# Patient Record
Sex: MALE | Race: WHITE | NOT HISPANIC OR LATINO | Employment: OTHER | URBAN - METROPOLITAN AREA
[De-identification: names, ages, dates, MRNs, and addresses within clinical notes are randomized per-mention and may not be internally consistent; named-entity substitution may affect disease eponyms.]

---

## 2017-01-26 ENCOUNTER — TRANSCRIBE ORDERS (OUTPATIENT)
Dept: ADMINISTRATIVE | Facility: HOSPITAL | Age: 62
End: 2017-01-26

## 2017-01-26 ENCOUNTER — TRANSCRIBE ORDERS (OUTPATIENT)
Dept: CARDIOLOGY | Facility: CLINIC | Age: 62
End: 2017-01-26

## 2017-01-26 DIAGNOSIS — J84.115 RESPIRATORY BRONCHIOLITIS INTERSTITIAL LUNG DISEASE (HCC): Primary | ICD-10-CM

## 2017-01-26 DIAGNOSIS — R06.02 SOB (SHORTNESS OF BREATH): Primary | ICD-10-CM

## 2017-01-30 ENCOUNTER — TRANSCRIBE ORDERS (OUTPATIENT)
Dept: CARDIOLOGY | Facility: CLINIC | Age: 62
End: 2017-01-30

## 2017-01-30 ENCOUNTER — HOSPITAL ENCOUNTER (OUTPATIENT)
Dept: RESPIRATORY THERAPY | Facility: HOSPITAL | Age: 62
Discharge: HOME | End: 2017-01-30
Attending: INTERNAL MEDICINE | Admitting: INTERNAL MEDICINE

## 2017-01-30 DIAGNOSIS — J84.115 RESPIRATORY BRONCHIOLITIS INTERSTITIAL LUNG DISEASE (HCC): ICD-10-CM

## 2017-01-30 DIAGNOSIS — R06.02 SHORTNESS OF BREATH: Primary | ICD-10-CM

## 2017-01-30 PROCEDURE — 94726 PLETHYSMOGRAPHY LUNG VOLUMES: CPT

## 2017-01-30 PROCEDURE — 94010 BREATHING CAPACITY TEST: CPT

## 2017-01-30 PROCEDURE — 94729 DIFFUSING CAPACITY: CPT

## 2017-01-30 RX ORDER — ALBUTEROL SULFATE 2.5 MG/3ML
2.5 SOLUTION RESPIRATORY (INHALATION) ONCE AS NEEDED
Status: DISCONTINUED | OUTPATIENT
Start: 2017-01-30 | End: 2017-01-31 | Stop reason: HOSPADM

## 2017-01-31 ENCOUNTER — HOSPITAL ENCOUNTER (OUTPATIENT)
Dept: CARDIOLOGY | Facility: HOSPITAL | Age: 62
Discharge: HOME | End: 2017-01-31
Attending: INTERNAL MEDICINE | Admitting: INTERNAL MEDICINE

## 2017-01-31 VITALS
HEIGHT: 70 IN | BODY MASS INDEX: 31.5 KG/M2 | WEIGHT: 220 LBS | DIASTOLIC BLOOD PRESSURE: 90 MMHG | HEART RATE: 74 BPM | SYSTOLIC BLOOD PRESSURE: 138 MMHG

## 2017-01-31 LAB
BH CV ECHO MEAS - ACS: 1.8 CM
BH CV ECHO MEAS - AO MAX PG (FULL): 6.9 MMHG
BH CV ECHO MEAS - AO MAX PG: 11.1 MMHG
BH CV ECHO MEAS - AO MEAN PG (FULL): 3.1 MMHG
BH CV ECHO MEAS - AO MEAN PG: 5.6 MMHG
BH CV ECHO MEAS - AO ROOT AREA (BSA CORRECTED): 1.4
BH CV ECHO MEAS - AO ROOT AREA: 7 CM^2
BH CV ECHO MEAS - AO ROOT DIAM: 3 CM
BH CV ECHO MEAS - AO V2 MAX: 167 CM/SEC
BH CV ECHO MEAS - AO V2 MEAN: 106 CM/SEC
BH CV ECHO MEAS - AO V2 VTI: 29.1 CM
BH CV ECHO MEAS - AVA(I,A): 2.3 CM^2
BH CV ECHO MEAS - AVA(I,D): 2.3 CM^2
BH CV ECHO MEAS - AVA(V,A): 2 CM^2
BH CV ECHO MEAS - AVA(V,D): 2 CM^2
BH CV ECHO MEAS - BSA(HAYCOCK): 2.2 M^2
BH CV ECHO MEAS - BSA: 2.2 M^2
BH CV ECHO MEAS - BZI_BMI: 31.6 KILOGRAMS/M^2
BH CV ECHO MEAS - BZI_METRIC_HEIGHT: 177.8 CM
BH CV ECHO MEAS - BZI_METRIC_WEIGHT: 99.8 KG
BH CV ECHO MEAS - CONTRAST EF (2CH): 62.5 ML/M^2
BH CV ECHO MEAS - CONTRAST EF 4CH: 60.3 ML/M^2
BH CV ECHO MEAS - EDV(MOD-SP2): 64 ML
BH CV ECHO MEAS - EDV(MOD-SP4): 63 ML
BH CV ECHO MEAS - EDV(TEICH): 132.7 ML
BH CV ECHO MEAS - EF(CUBED): 74.8 %
BH CV ECHO MEAS - EF(MOD-SP2): 62.5 %
BH CV ECHO MEAS - EF(MOD-SP4): 60.3 %
BH CV ECHO MEAS - EF(TEICH): 66.3 %
BH CV ECHO MEAS - ESV(MOD-SP2): 24 ML
BH CV ECHO MEAS - ESV(MOD-SP4): 25 ML
BH CV ECHO MEAS - ESV(TEICH): 44.7 ML
BH CV ECHO MEAS - FS: 36.8 %
BH CV ECHO MEAS - IVS/LVPW: 1
BH CV ECHO MEAS - IVSD: 1.2 CM
BH CV ECHO MEAS - LAT PEAK E' VEL: 8 CM/SEC
BH CV ECHO MEAS - LV DIASTOLIC VOL/BSA (35-75): 29 ML/M^2
BH CV ECHO MEAS - LV MASS(C)D: 259.4 GRAMS
BH CV ECHO MEAS - LV MASS(C)DI: 119.3 GRAMS/M^2
BH CV ECHO MEAS - LV MAX PG: 4.2 MMHG
BH CV ECHO MEAS - LV MEAN PG: 2.6 MMHG
BH CV ECHO MEAS - LV SYSTOLIC VOL/BSA (12-30): 11.5 ML/M^2
BH CV ECHO MEAS - LV V1 MAX: 102.8 CM/SEC
BH CV ECHO MEAS - LV V1 MEAN: 75.5 CM/SEC
BH CV ECHO MEAS - LV V1 VTI: 20.7 CM
BH CV ECHO MEAS - LVIDD: 5.3 CM
BH CV ECHO MEAS - LVIDS: 3.3 CM
BH CV ECHO MEAS - LVLD AP2: 6.8 CM
BH CV ECHO MEAS - LVLD AP4: 6.7 CM
BH CV ECHO MEAS - LVLS AP2: 6 CM
BH CV ECHO MEAS - LVLS AP4: 5.8 CM
BH CV ECHO MEAS - LVOT AREA (M): 3.1 CM^2
BH CV ECHO MEAS - LVOT AREA: 3.2 CM^2
BH CV ECHO MEAS - LVOT DIAM: 2 CM
BH CV ECHO MEAS - LVPWD: 1.2 CM
BH CV ECHO MEAS - MED PEAK E' VEL: 7 CM/SEC
BH CV ECHO MEAS - MV A DUR: 0.11 SEC
BH CV ECHO MEAS - MV A MAX VEL: 82 CM/SEC
BH CV ECHO MEAS - MV DEC SLOPE: 166.2 CM/SEC^2
BH CV ECHO MEAS - MV DEC TIME: 0.35 SEC
BH CV ECHO MEAS - MV E MAX VEL: 54.3 CM/SEC
BH CV ECHO MEAS - MV E/A: 0.66
BH CV ECHO MEAS - MV MAX PG: 2.6 MMHG
BH CV ECHO MEAS - MV MEAN PG: 0.99 MMHG
BH CV ECHO MEAS - MV P1/2T MAX VEL: 55.8 CM/SEC
BH CV ECHO MEAS - MV P1/2T: 98.3 MSEC
BH CV ECHO MEAS - MV V2 MAX: 80.7 CM/SEC
BH CV ECHO MEAS - MV V2 MEAN: 46.3 CM/SEC
BH CV ECHO MEAS - MV V2 VTI: 18.6 CM
BH CV ECHO MEAS - MVA P1/2T LCG: 3.9 CM^2
BH CV ECHO MEAS - MVA(P1/2T): 2.2 CM^2
BH CV ECHO MEAS - MVA(VTI): 3.6 CM^2
BH CV ECHO MEAS - PA MAX PG (FULL): 4.2 MMHG
BH CV ECHO MEAS - PA MAX PG: 5.8 MMHG
BH CV ECHO MEAS - PA V2 MAX: 120.8 CM/SEC
BH CV ECHO MEAS - PVA(V,A): 1.7 CM^2
BH CV ECHO MEAS - PVA(V,D): 1.7 CM^2
BH CV ECHO MEAS - QP/QS: 0.51
BH CV ECHO MEAS - RV MAX PG: 1.6 MMHG
BH CV ECHO MEAS - RV MEAN PG: 0.9 MMHG
BH CV ECHO MEAS - RV V1 MAX: 63.1 CM/SEC
BH CV ECHO MEAS - RV V1 MEAN: 44.5 CM/SEC
BH CV ECHO MEAS - RV V1 VTI: 10.3 CM
BH CV ECHO MEAS - RVOT AREA: 3.3 CM^2
BH CV ECHO MEAS - RVOT DIAM: 2.1 CM
BH CV ECHO MEAS - SI(AO): 94.5 ML/M^2
BH CV ECHO MEAS - SI(CUBED): 49.9 ML/M^2
BH CV ECHO MEAS - SI(LVOT): 30.8 ML/M^2
BH CV ECHO MEAS - SI(MOD-SP2): 18.4 ML/M^2
BH CV ECHO MEAS - SI(MOD-SP4): 17.5 ML/M^2
BH CV ECHO MEAS - SI(TEICH): 40.5 ML/M^2
BH CV ECHO MEAS - SUP REN AO DIAM: 2 CM
BH CV ECHO MEAS - SV(AO): 205.3 ML
BH CV ECHO MEAS - SV(CUBED): 108.5 ML
BH CV ECHO MEAS - SV(LVOT): 67 ML
BH CV ECHO MEAS - SV(MOD-SP2): 40 ML
BH CV ECHO MEAS - SV(MOD-SP4): 38 ML
BH CV ECHO MEAS - SV(RVOT): 34.3 ML
BH CV ECHO MEAS - SV(TEICH): 87.9 ML
BH CV ECHO MEAS - TAPSE (>1.6): 2.2 CM2
BH CV XLRA - RV BASE: 2.8 CM
BH CV XLRA - TDI S': 13 CM/SEC
E/E' RATIO: 8
LEFT ATRIUM VOLUME INDEX: 14 ML/M2
LEFT ATRIUM VOLUME: 31 CM3
LV EF 2D ECHO EST: 60 %

## 2017-01-31 PROCEDURE — 93306 TTE W/DOPPLER COMPLETE: CPT

## 2017-01-31 PROCEDURE — 93306 TTE W/DOPPLER COMPLETE: CPT | Performed by: INTERNAL MEDICINE

## 2017-07-30 ENCOUNTER — PREP FOR SURGERY (OUTPATIENT)
Dept: OTHER | Facility: HOSPITAL | Age: 62
End: 2017-07-30

## 2017-07-30 DIAGNOSIS — K63.5 COLON POLYPS: Primary | ICD-10-CM

## 2017-07-30 DIAGNOSIS — Z80.0 FH: COLON CANCER: ICD-10-CM

## 2017-07-30 RX ORDER — SODIUM CHLORIDE 0.9 % (FLUSH) 0.9 %
1-10 SYRINGE (ML) INJECTION AS NEEDED
Status: CANCELLED | OUTPATIENT
Start: 2017-12-05

## 2017-08-14 PROBLEM — K63.5 COLON POLYPS: Status: ACTIVE | Noted: 2017-08-14

## 2017-08-14 PROBLEM — Z80.0 FH: COLON CANCER: Status: ACTIVE | Noted: 2017-08-14

## 2017-12-05 ENCOUNTER — HOSPITAL ENCOUNTER (OUTPATIENT)
Facility: HOSPITAL | Age: 62
Setting detail: HOSPITAL OUTPATIENT SURGERY
Discharge: HOME | End: 2017-12-05
Attending: INTERNAL MEDICINE | Admitting: INTERNAL MEDICINE

## 2017-12-05 ENCOUNTER — ANESTHESIA (OUTPATIENT)
Dept: GASTROENTEROLOGY | Facility: HOSPITAL | Age: 62
End: 2017-12-05

## 2017-12-05 ENCOUNTER — ANESTHESIA EVENT (OUTPATIENT)
Dept: GASTROENTEROLOGY | Facility: HOSPITAL | Age: 62
End: 2017-12-05

## 2017-12-05 VITALS
HEIGHT: 70 IN | SYSTOLIC BLOOD PRESSURE: 122 MMHG | WEIGHT: 228 LBS | RESPIRATION RATE: 16 BRPM | DIASTOLIC BLOOD PRESSURE: 79 MMHG | BODY MASS INDEX: 32.64 KG/M2 | TEMPERATURE: 97.2 F | HEART RATE: 64 BPM | OXYGEN SATURATION: 95 %

## 2017-12-05 DIAGNOSIS — K63.5 COLON POLYPS: ICD-10-CM

## 2017-12-05 DIAGNOSIS — Z80.0 FH: COLON CANCER: ICD-10-CM

## 2017-12-05 PROCEDURE — 45378 DIAGNOSTIC COLONOSCOPY: CPT | Performed by: INTERNAL MEDICINE

## 2017-12-05 PROCEDURE — 25010000002 PROPOFOL 10 MG/ML EMULSION: Performed by: ANESTHESIOLOGY

## 2017-12-05 RX ORDER — MONTELUKAST SODIUM 10 MG/1
10 TABLET ORAL EVERY MORNING
COMMUNITY

## 2017-12-05 RX ORDER — AZELASTINE 1 MG/ML
1 SPRAY, METERED NASAL DAILY
COMMUNITY

## 2017-12-05 RX ORDER — LIDOCAINE HYDROCHLORIDE 20 MG/ML
INJECTION, SOLUTION INFILTRATION; PERINEURAL AS NEEDED
Status: DISCONTINUED | OUTPATIENT
Start: 2017-12-05 | End: 2017-12-05 | Stop reason: SURG

## 2017-12-05 RX ORDER — PROPOFOL 10 MG/ML
VIAL (ML) INTRAVENOUS AS NEEDED
Status: DISCONTINUED | OUTPATIENT
Start: 2017-12-05 | End: 2017-12-05 | Stop reason: SURG

## 2017-12-05 RX ORDER — SODIUM CHLORIDE, SODIUM LACTATE, POTASSIUM CHLORIDE, CALCIUM CHLORIDE 600; 310; 30; 20 MG/100ML; MG/100ML; MG/100ML; MG/100ML
30 INJECTION, SOLUTION INTRAVENOUS CONTINUOUS PRN
Status: DISCONTINUED | OUTPATIENT
Start: 2017-12-05 | End: 2017-12-05 | Stop reason: HOSPADM

## 2017-12-05 RX ORDER — SODIUM CHLORIDE 0.9 % (FLUSH) 0.9 %
1-10 SYRINGE (ML) INJECTION AS NEEDED
Status: DISCONTINUED | OUTPATIENT
Start: 2017-12-05 | End: 2017-12-05 | Stop reason: HOSPADM

## 2017-12-05 RX ORDER — MOMETASONE FUROATE 50 UG/1
2 SPRAY, METERED NASAL AS NEEDED
COMMUNITY
End: 2019-02-27

## 2017-12-05 RX ADMIN — PROPOFOL 200 MG: 10 INJECTION, EMULSION INTRAVENOUS at 11:50

## 2017-12-05 RX ADMIN — SODIUM CHLORIDE, POTASSIUM CHLORIDE, SODIUM LACTATE AND CALCIUM CHLORIDE 30 ML/HR: 600; 310; 30; 20 INJECTION, SOLUTION INTRAVENOUS at 11:07

## 2017-12-05 RX ADMIN — PROPOFOL 200 MG: 10 INJECTION, EMULSION INTRAVENOUS at 11:34

## 2017-12-05 RX ADMIN — LIDOCAINE HYDROCHLORIDE 100 MG: 20 INJECTION, SOLUTION INFILTRATION; PERINEURAL at 11:34

## 2017-12-05 NOTE — DISCHARGE INSTRUCTIONS
For the next 24 hours patient needs to be with a responsible adult.    For 24 hours DO NOT drive, operate machinery, appliances, drink alcohol, make important decisions or sign legal documents.    Start with a light or bland diet and advance to regular diet as tolerated.    Follow recommendations on procedure report provided by your doctor.    Call Dr. Roldan for problems [phone]    Problems may include but not limited to: large amounts of bleeding, trouble breathing, repeated vomiting, severe unrelieved pain, fever or chills.       WHAT ARE DIVERTICULOSIS AND DIVERTICULITIS?  Many people have small pouches in their colons that bulge outward through weak spots, like an inner tube that pokes through weak places in a tire.  Each pouch is called a diverticulum.  The condition of having diverticula is DIVERTICULOSIS.  The condition becomes more common as people age.  About half of all people over the age of 60 have diverticulosis    When pouches become infected or inflamed, the condition is called DIVERTICULITIS.  This happens in 10% to 25% of people with diverticulosis.  Diverticulosis and diverticulitis are also called DIVERTICULAR DISEASE.     WHAT ARE THE SYMPTOMS?  Diverticulosis - Most people do not have any discomfort or symptoms.  However, symptoms may include mild cramps, bloating, and constipation.  Other diseases such as irritable bowel syndrome (IBS) and stomach ulcers cause similar problems, so these symptoms do not always mean a person has diverticulosis.  You should visit your doctor if you have these troubling symptoms.    Diverticulitis - The most common symptom is abdominal pain.  The most common sign is tenderness around the left side of the lower abdomen.  If infection is the cause, fever, nausea, vomiting, chills, cramping, and constipation may occur as well.  The severity depends on the extent of the infection and complications.    WHAT ARE THE COMPLICATIONS?  Diverticulitis can lead to  bleeding, infections,perforations or tears, or blockages.  These complications always require treatment to prevent them from proggressing and causing serous illness.    Bleeding from a diverticula is a rare complication.  When this occurs, blood may appear in the toilet or in your stool.  Bleeding can be severe, but it may stop by itself and not require treatment.  Doctors believe bleeding diverticula are caused by a small blood vessel in a diverticulum that weakens and finally bursts.  If you have bleeding from the rectum, you should see your doctor.  If the bleeding does not stop you may need surgery.    Abscess, Perforation, and Peritonitis - The infection causing diverticulitis often clears up after a few days of treatment with antibiotics.  If the condition gets worse, an abscess may form in the colon.  An abscess is an infected area with pus that may cause swelling and destroy tissue.  Sometimes the infected diverticula may develop small holes, called perforations.  These perforations allow pus to leak out of the colon into the abdominal area.  If the abscess is small and remains in the colon, it may clear up after treatment with antibiotics.  If not, the doctor may need to drain it.  A large abscess can become a serious problem if the infection leaks out and contaminates areas outside the colon.  Infection that spreads into the abdominal cavity is called peritonitis.  Peritonitis requires immediate surgery toclean the abdominal cavity and remove the damaged part of the colon.  Without surgery, peritonitis can be fatal.    FISTULA  A fistula is an abnormal connection of tissue between two organs or between an organ and the skin.  When damaged tissues come into contact with each other during infection, they sometimes stick together.  If they heal that way, a fistula forms.  When diverticulitis-related infection spreads through out the colon, the colon's tissue may stick to nearby tissues.  The organs usually  involved are the bladder, small intestine, and skin.  The problem can be corrected with surgery to remove the fistula and affected part of the colon.    INTESTINAL OBSTRUCTION  The scarring caused by infection may cause partial or total blockage of the large intestine.  When this happens, the colon is unable to move bowel contents normally.  When the obstruction totally blocks the intestine, emergency surgery is necessary.  Partial blockage is not an emergency, so the surgery to correct it can be planned.    WHAT CAUSES DIVERTICULAR DISEASE  Although not proven, the dominant theory is that a low-fiber diet is the main cause of diverticular disease.  The disease was first noticed in the [place] in the early 1900s.  At about the same time, processed foods were introduced into the American diet.  Many processed foods contain refined, low-fiber flour.  Unlike whole-wheat flour, refined flour has no wheat bran.    Diverticular disease is common in developed or industrialized countries-particularly the [place], [place], and [place]-where low-fiber diets are common.  The disease is rare in countries of [place] and [place], where people eat high-fiber vegetable diets.    Fiber is the part of fruits, vegetables, and whole grains that the body cannot digest.  Some fiber dissolves easily in water (soluble fiber).  It takes on a soft, jelly-like texture in the intestines.  Some fiber passes almost unchanged through the intestines (insoluble fiber).  Both kinds of fiber help make stools soft and easy to pass.  Fiber also prevents constipation.    Constipation makes the muscles strain to move stool that is too hard.  It is the main cause of increased pressure in the colon.  This excess pressure might cause the weak spots in the colon to bulge out and become diverticula.  Diverticulitis occurs when diverticula become infected or inflamed.  Doctors are not certain what causes the infection.  It may begin when stool or  bacteria are caught in the diverticula.  An attack of diverticulitis can develop suddenly and without warning.    HOW DOES THE DOCTOR DIAGNOSE DIVERTICULAR DISEASE  The doctor asks about medical history, does a physical exam, and may perform one or more diagnostic tests.  Because most people do not have symptoms, diverticulosis is often found through tests ordered for another ailment.    When taking a medical history, the doctor may ask about bowel habits, symptoms, pain, diet, and medications.  The physical exam usually involves a digital rectal exam.  To preform this test. The doctor inserts a gloved, lubricated finger into the rectum to detect tenderness, blockage, or blood.  The doctor may check stool for signs of bleeding and test blood for signs of infection.  The doctor may also order x-rays or other tests.    WHAT IS THE TREATMENT FOR DIVERTICULAR DISEASE  Increasing the amount of fiber in the diet may reduce symptoms of diverticulosis and prevent complications such as diverticulitis.  Fiber keeps stool soft and lowers pressure inside the colon so that bowel contents can move through easily.  The American Dietetic Association. Recommends 20 to 35 grams of fiber each day.  The doctor may also recommend taking a fiber product such as Citrucel or Metamucil once a dya.  These products are mixed water and provide about 2 to 3.5 grams of fiber per  Tablespoon, mixed with 8 ounces of water.    Avoidance of nuts, popcorn, and sunflower, pumpkin, duane, and sesame seeds has been recommended by physicians out of fear that food particles could enter, block, or irritate the diverticula.  However, no scientific data support this treatment measure.  Eating a high-fiber diet is the only requirement highly emphasized across the medical literature.  Eliminating specific foods is not necessary.  The seeds in tomatoes, zucchini, cucumbers, strawberries, and raspberries, as well as poppy seeds, are generally considered  harmless.  People differ in amounts and types of foods the can eat.  Decisions about diet should be made based on what works best for each person.  Keeping a food diary may help identify what foods may cause symptoms.    If cramps, bloating, and constipation are problems, the doctor may prescribe  Short course of pain medication.  However, many medications affect emptying of the colon, an undesirable side effect for people with diverticulosis.    DIVERTICULITIS  Treatment focuses on clearing up the infection and inflammation, resting the colon, and preventing or minimizing complications.  An attack of diverticulitis without complications may respond to antibiotics within a few days if treated early.  To help the colon rest, the doctor may recommend bed rest and a liquid diet, along with a pain reliever.    An acute attack with severe pain or sever infection may require a hospital stay.  Most acute cases of diverticulitis are treated with antibiotics and a liquid diet.  The antibiotics are given by injection into a vein.  In some cases, however, surgery may be necessary.    WHEN IS SURGERY NECESSARY  If attacks are severe or frequent, the doctor may advise surgery.  The surgeon removes the affected part of the colon and joins the remaining sections.  This typed of surgery, called colon resection, aims to keep attacks from coming back and to prevent complications.  The doctor may also recommend surgery for complications of a fistula or intestinal obstruction.    If antibiotics do not correct an attack, emergency surgery may be required.  Other reasons for emergency surgery include a large abscess, perforation, peritonitis, or continued bleeding.    Emergency surgery usually involves 2 operations.  The first will clear the infected abdominal cavity and remove part of the colon.  Because infection and sometimes obstruction, it is not safe to rejoin the colon during the first operation.  Instead, the surgeon creates a  temporary hole, or stoma, in the abdomen.  The end of the colon is connected to the hole, a procedure called a colostomy, to allow normal eating and bowel movements.  The stool goes into a bag attached to the opening in the abdomen.  In the second operation, the surgeon rejoins the ends of the colon.

## 2017-12-05 NOTE — ANESTHESIA PREPROCEDURE EVALUATION
Anesthesia Evaluation     Patient summary reviewed and Nursing notes reviewed   NPO Solid Status: > 8 hours  NPO Liquid Status: > 6 hours     Airway   Dental      Pulmonary    (+) asthma,   Cardiovascular         Neuro/Psych  GI/Hepatic/Renal/Endo    (+) obesity,      Musculoskeletal     Abdominal    Substance History      OB/GYN          Other                                      Anesthesia Plan    ASA 3     MAC     Anesthetic plan and risks discussed with patient.

## 2017-12-05 NOTE — ANESTHESIA POSTPROCEDURE EVALUATION
"Patient: Nathan Burk    Procedure Summary     Date Anesthesia Start Anesthesia Stop Room / Location    12/05/17 1134 1157  [facility] /  [facility]       Procedure Diagnosis Surgeon Provider    COLONOSCOPY to cecum and t.i. (N/A ) Colon polyps; FH: colon cancer  (Colon polyps [K63.5]; FH: colon cancer [Z80.0]) MD Cony Franco MD          Anesthesia Type: MAC  Last vitals  BP   122/79 (12/05/17 1218)   Temp   36.2 °C (97.2 °F) (12/05/17 1208)   Pulse   64 (12/05/17 1218)   Resp   16 (12/05/17 1218)     SpO2   95 % (12/05/17 1218)     Post Anesthesia Care and Evaluation    Patient location during evaluation: PACU  Patient participation: complete - patient participated  Level of consciousness: awake  Pain score: 0  Pain management: adequate  Airway patency: patent  Anesthetic complications: No anesthetic complications    Cardiovascular status: acceptable  Respiratory status: acceptable  Hydration status: acceptable    Comments: Blood pressure 122/79, pulse 64, temperature 36.2 °C (97.2 °F), temperature source Oral, resp. rate 16, height 177.8 cm (70\"), weight 103 kg (228 lb), SpO2 95 %.    No anesthesia care post op    "

## 2017-12-05 NOTE — PLAN OF CARE
Problem: Patient Care Overview (Adult)  Goal: Plan of Care Review  Outcome: Ongoing (interventions implemented as appropriate)    12/05/17 1046   Coping/Psychosocial Response Interventions   Plan Of Care Reviewed With patient   Patient Care Overview   Progress no change       Goal: Adult Individualization and Mutuality  Outcome: Ongoing (interventions implemented as appropriate)    12/05/17 1046   Individualization   Patient Specific Preferences none       Goal: Discharge Needs Assessment  Outcome: Ongoing (interventions implemented as appropriate)    12/05/17 1046   Discharge Needs Assessment   Concerns To Be Addressed no discharge needs identified   Living Environment   Transportation Available family or friend will provide         Problem: GI Endoscopy (Adult)  Goal: Signs and Symptoms of Listed Potential Problems Will be Absent or Manageable (GI Endoscopy)  Outcome: Ongoing (interventions implemented as appropriate)    12/05/17 1046   GI Endoscopy   Problems Present (GI Endoscopy) none

## 2017-12-05 NOTE — H&P
"[facility]  Pre Procedure History & Physical    Chief Complaint:   H/o colonic tubulovillous adenomas. His dad had colon cancer at 64 yrs of age. He last had a colonoscopy in 7/15.    Subjective     HPI:   62 y.o. male with a H/o colonic tubulovillous adenomas. His dad had colon cancer at 64 yrs of age. He last had a colonoscopy in 7/15.        Past Medical History:   Past Medical History:   Diagnosis Date   • Arthritis    • Asthma    • Cancer     skin       Family History:  Family History   Problem Relation Age of Onset   • Colon cancer Father        Social History:   reports that he has quit smoking. He has never used smokeless tobacco. He reports that he drinks alcohol. He reports that he does not use illicit drugs.    Medications:   Prescriptions Prior to Admission   Medication Sig Dispense Refill Last Dose   • azelastine (ASTELIN) 0.1 % nasal spray 1 spray into each nostril Daily. Use in each nostril as directed   12/5/2017 at Unknown time   • mometasone (NASONEX) 50 MCG/ACT nasal spray 2 sprays into each nostril As Needed.   Past Week at Unknown time   • montelukast (SINGULAIR) 10 MG tablet Take 10 mg by mouth Every Night.   12/4/2017 at Unknown time   • SIMVASTATIN PO Take 1 tablet by mouth Daily.   12/4/2017 at Unknown time   • Umeclidinium-Vilanterol (ANORO ELLIPTA) 62.5-25 MCG/INH aerosol powder  Inhale 1 puff Daily.   12/5/2017 at Unknown time   • UNKNOWN TO PATIENT Take 1 tablet by mouth Daily. Anti inflammatory   Past Week at Unknown time       Allergies:  Review of patient's allergies indicates no known allergies.    ROS:    Pertinent items are noted in HPI     Objective     Blood pressure 126/79, pulse 61, temperature 97.9 °F (36.6 °C), temperature source Oral, resp. rate 18, height 177.8 cm (70\"), weight 103 kg (228 lb), SpO2 94 %.    Physical Exam   Constitutional: Pt is oriented to person, place, and time and well-developed, well-nourished, and in no distress.   HENT: "   Mouth/Throat: Oropharynx is clear and moist.   Neck: Normal range of motion. Neck supple.   Cardiovascular: Normal rate, regular rhythm and normal heart sounds.    Pulmonary/Chest: Effort normal and breath sounds normal. No respiratory distress. No  wheezes.   Abdominal: Soft. Bowel sounds are normal.   Skin: Skin is warm and dry.   Psychiatric: Mood, memory, affect and judgment normal.     Assessment/Plan     Diagnosis:  62 y.o. male with a H/o colonic tubulovillous adenomas. His dad had colon cancer at 64 yrs of age. He last had a colonoscopy in 7/15.        Anticipated Surgical Procedure:  Colonoscopy    The risks, benefits, and alternatives of this procedure have been discussed with the patient or the responsible party- the patient understands and agrees to proceed.

## 2018-02-08 ENCOUNTER — OFFICE VISIT (OUTPATIENT)
Dept: FAMILY MEDICINE CLINIC | Facility: CLINIC | Age: 63
End: 2018-02-08

## 2018-02-08 VITALS
DIASTOLIC BLOOD PRESSURE: 80 MMHG | HEIGHT: 71 IN | SYSTOLIC BLOOD PRESSURE: 128 MMHG | BODY MASS INDEX: 32.26 KG/M2 | HEART RATE: 80 BPM | OXYGEN SATURATION: 98 % | WEIGHT: 230.4 LBS

## 2018-02-08 DIAGNOSIS — K21.9 GASTROESOPHAGEAL REFLUX DISEASE WITHOUT ESOPHAGITIS: ICD-10-CM

## 2018-02-08 DIAGNOSIS — Z12.5 SCREENING PSA (PROSTATE SPECIFIC ANTIGEN): ICD-10-CM

## 2018-02-08 DIAGNOSIS — R05.3 CHRONIC COUGH: ICD-10-CM

## 2018-02-08 DIAGNOSIS — E78.00 PURE HYPERCHOLESTEROLEMIA: Primary | ICD-10-CM

## 2018-02-08 PROCEDURE — 90472 IMMUNIZATION ADMIN EACH ADD: CPT | Performed by: FAMILY MEDICINE

## 2018-02-08 PROCEDURE — 99204 OFFICE O/P NEW MOD 45 MIN: CPT | Performed by: FAMILY MEDICINE

## 2018-02-08 PROCEDURE — 90732 PPSV23 VACC 2 YRS+ SUBQ/IM: CPT | Performed by: FAMILY MEDICINE

## 2018-02-08 PROCEDURE — 90715 TDAP VACCINE 7 YRS/> IM: CPT | Performed by: FAMILY MEDICINE

## 2018-02-08 PROCEDURE — 90471 IMMUNIZATION ADMIN: CPT | Performed by: FAMILY MEDICINE

## 2018-02-08 RX ORDER — LANSOPRAZOLE 30 MG/1
30 CAPSULE, DELAYED RELEASE ORAL DAILY
COMMUNITY

## 2018-02-08 NOTE — PROGRESS NOTES
"Subjective   Nathan Burk is a 63 y.o. male.     Chief Complaint   Patient presents with   • Hyperlipidemia     Est care, med refills         History of Present Illness    New patient.  Here to get established.  Chronic cough.  At least 2 years.  Moved here from [place] about 3 years ago.  Last 2 years he's had a bad chronic cough.  Went to multiple specialties.  Had negative pulmonary workup reportedly.  Negative ENT workup.  He is undergoing a allergy, immunological workup.  Questionable immunological deficiency.  The immunologist wants him to have a TdaP and a Pneumovax and then check antibody response.  Since starting on Prevacid about a year ago and cutting out spicy food, caffeine, and other agents, his cough has remarkably improved.  He went from being a problem to a nuisance.  He is also using inhalers other medication prescribed by the allergist.    GERD.  No specific symptoms other than cough.  He takes Prevacid over-the-counter and has been doing well.    Hyperlipidemia.  He was on simvastatin for some time.  I have no records of previous cholesterol levels.  He ran out of the simvastatin a few weeks ago.  He does not describe any problems with the simvastatin such as myopathy.        The following portions of the patient's history were reviewed and updated as appropriate: allergies, current medications, past family history, past medical history, past social history, past surgical history and problem list.          Review of Systems   Constitutional: Negative.    Respiratory: Positive for cough. Negative for shortness of breath and wheezing.    Cardiovascular: Negative.    Musculoskeletal: Negative.    Neurological: Negative.    Psychiatric/Behavioral: Negative.        Objective   Blood pressure 128/80, pulse 80, height 180 cm (70.87\"), weight 105 kg (230 lb 6.4 oz), SpO2 98 %.  Physical Exam   Constitutional: He appears well-developed and well-nourished. No distress.   Neck: No thyromegaly present. "   Cardiovascular: Normal rate, regular rhythm, normal heart sounds and intact distal pulses.    Pulmonary/Chest: Effort normal and breath sounds normal.   Musculoskeletal: He exhibits no edema.   Skin: Skin is warm and dry.   Psychiatric: He has a normal mood and affect. His behavior is normal. Judgment and thought content normal.   Nursing note and vitals reviewed.      Assessment/Plan   Nathan was seen today for hyperlipidemia.    Diagnoses and all orders for this visit:    Pure hypercholesterolemia  -     CBC & Differential  -     Comprehensive Metabolic Panel  -     Lipid Panel  -     PSA Screen    Chronic cough  -     CBC & Differential    Gastroesophageal reflux disease without esophagitis  -     CBC & Differential    Screening PSA (prostate specific antigen)    Other orders  -     Tdap Vaccine Greater Than or Equal To 6yo IM  -     Pneumococcal Polysaccharide Vaccine 23-Valent Greater Than or Equal To 1yo Subcutaneous / IM        Chronic cough.  Likely related to GERD.  He's doing fairly well on Prevacid over-the-counter and cutting back on his spicy food and caffeine.  The immunologist is working up possible IgG deficiency.  They have asked us to do a TdaP and a Pneumovax, both good DS.  I'll see him back within a few months for complete physical examination.    GERD.  He continues Prevacid.  We will discuss need for colonoscopy next visit if indicated.    Hyper lipidemia.  He's been off simvastatin for about 3 weeks now.  I'm recommending repeat lipid panel today, patient is fasting.  Then determine 10 year risk and consider restarting a statin.  I might switch to atorvastatin if needed.

## 2018-03-09 LAB
ALBUMIN SERPL-MCNC: 4.8 G/DL (ref 3.6–4.8)
ALBUMIN/GLOB SERPL: 2.5 {RATIO} (ref 1.2–2.2)
ALP SERPL-CCNC: 56 IU/L (ref 39–117)
ALT SERPL-CCNC: 52 IU/L (ref 0–44)
AST SERPL-CCNC: 39 IU/L (ref 0–40)
BASOPHILS # BLD AUTO: 0 X10E3/UL (ref 0–0.2)
BASOPHILS NFR BLD AUTO: 1 %
BILIRUB SERPL-MCNC: 0.3 MG/DL (ref 0–1.2)
BUN SERPL-MCNC: 20 MG/DL (ref 8–27)
BUN/CREAT SERPL: 21 (ref 10–24)
CALCIUM SERPL-MCNC: 9.7 MG/DL (ref 8.6–10.2)
CHLORIDE SERPL-SCNC: 101 MMOL/L (ref 96–106)
CHOLEST SERPL-MCNC: 251 MG/DL (ref 100–199)
CO2 SERPL-SCNC: 23 MMOL/L (ref 18–29)
CREAT SERPL-MCNC: 0.94 MG/DL (ref 0.76–1.27)
EOSINOPHIL # BLD AUTO: 0.1 X10E3/UL (ref 0–0.4)
EOSINOPHIL NFR BLD AUTO: 2 %
ERYTHROCYTE [DISTWIDTH] IN BLOOD BY AUTOMATED COUNT: 13.3 % (ref 12.3–15.4)
GFR SERPLBLD CREATININE-BSD FMLA CKD-EPI: 100 ML/MIN/1.73
GFR SERPLBLD CREATININE-BSD FMLA CKD-EPI: 87 ML/MIN/1.73
GLOBULIN SER CALC-MCNC: 1.9 G/DL (ref 1.5–4.5)
GLUCOSE SERPL-MCNC: 110 MG/DL (ref 65–99)
HCT VFR BLD AUTO: 46.5 % (ref 37.5–51)
HDLC SERPL-MCNC: 40 MG/DL
HGB BLD-MCNC: 16.3 G/DL (ref 13–17.7)
IMM GRANULOCYTES # BLD: 0 X10E3/UL (ref 0–0.1)
IMM GRANULOCYTES NFR BLD: 0 %
LDLC SERPL CALC-MCNC: 172 MG/DL (ref 0–99)
LYMPHOCYTES # BLD AUTO: 1.8 X10E3/UL (ref 0.7–3.1)
LYMPHOCYTES NFR BLD AUTO: 32 %
MCH RBC QN AUTO: 31.8 PG (ref 26.6–33)
MCHC RBC AUTO-ENTMCNC: 35.1 G/DL (ref 31.5–35.7)
MCV RBC AUTO: 91 FL (ref 79–97)
MONOCYTES # BLD AUTO: 0.6 X10E3/UL (ref 0.1–0.9)
MONOCYTES NFR BLD AUTO: 11 %
NEUTROPHILS # BLD AUTO: 3.1 X10E3/UL (ref 1.4–7)
NEUTROPHILS NFR BLD AUTO: 54 %
PLATELET # BLD AUTO: 204 X10E3/UL (ref 150–379)
POTASSIUM SERPL-SCNC: 4.6 MMOL/L (ref 3.5–5.2)
PROT SERPL-MCNC: 6.7 G/DL (ref 6–8.5)
PSA SERPL-MCNC: 0.6 NG/ML (ref 0–4)
RBC # BLD AUTO: 5.12 X10E6/UL (ref 4.14–5.8)
SODIUM SERPL-SCNC: 141 MMOL/L (ref 134–144)
TRIGL SERPL-MCNC: 195 MG/DL (ref 0–149)
VLDLC SERPL CALC-MCNC: 39 MG/DL (ref 5–40)
WBC # BLD AUTO: 5.7 X10E3/UL (ref 3.4–10.8)

## 2018-03-12 RX ORDER — SIMVASTATIN 40 MG
40 TABLET ORAL NIGHTLY
Qty: 90 TABLET | Refills: 1 | Status: SHIPPED | OUTPATIENT
Start: 2018-03-12 | End: 2018-10-01 | Stop reason: SDUPTHER

## 2018-03-26 ENCOUNTER — LAB (OUTPATIENT)
Dept: LAB | Facility: HOSPITAL | Age: 63
End: 2018-03-26
Attending: ORTHOPAEDIC SURGERY

## 2018-03-26 ENCOUNTER — TRANSCRIBE ORDERS (OUTPATIENT)
Dept: ADMINISTRATIVE | Facility: HOSPITAL | Age: 63
End: 2018-03-26

## 2018-03-26 ENCOUNTER — HOSPITAL ENCOUNTER (OUTPATIENT)
Dept: GENERAL RADIOLOGY | Facility: HOSPITAL | Age: 63
Discharge: HOME | End: 2018-03-26
Attending: ORTHOPAEDIC SURGERY

## 2018-03-26 ENCOUNTER — HOSPITAL ENCOUNTER (OUTPATIENT)
Dept: CARDIOLOGY | Facility: HOSPITAL | Age: 63
Discharge: HOME | End: 2018-03-26
Attending: ORTHOPAEDIC SURGERY | Admitting: ORTHOPAEDIC SURGERY

## 2018-03-26 DIAGNOSIS — Z01.818 PRE-OP TESTING: Primary | ICD-10-CM

## 2018-03-26 DIAGNOSIS — Z01.818 PRE-OP TESTING: ICD-10-CM

## 2018-03-26 LAB
ALBUMIN SERPL-MCNC: 4.5 G/DL (ref 3.5–5.2)
ALBUMIN/GLOB SERPL: 1.7 G/DL
ALP SERPL-CCNC: 58 U/L (ref 39–117)
ALT SERPL W P-5'-P-CCNC: 44 U/L (ref 1–41)
ANION GAP SERPL CALCULATED.3IONS-SCNC: 12.5 MMOL/L
AST SERPL-CCNC: 24 U/L (ref 1–40)
BACTERIA UR QL AUTO: NORMAL /HPF
BASOPHILS # BLD AUTO: 0.02 10*3/MM3 (ref 0–0.2)
BASOPHILS NFR BLD AUTO: 0.2 % (ref 0–1.5)
BILIRUB SERPL-MCNC: 0.3 MG/DL (ref 0.1–1.2)
BILIRUB UR QL STRIP: NEGATIVE
BUN BLD-MCNC: 26 MG/DL (ref 8–23)
BUN/CREAT SERPL: 25.5 (ref 7–25)
CALCIUM SPEC-SCNC: 10.3 MG/DL (ref 8.6–10.5)
CHLORIDE SERPL-SCNC: 104 MMOL/L (ref 98–107)
CLARITY UR: CLEAR
CO2 SERPL-SCNC: 28.5 MMOL/L (ref 22–29)
COLOR UR: YELLOW
CREAT BLD-MCNC: 1.02 MG/DL (ref 0.76–1.27)
DEPRECATED RDW RBC AUTO: 45.4 FL (ref 37–54)
EOSINOPHIL # BLD AUTO: 0.01 10*3/MM3 (ref 0–0.7)
EOSINOPHIL NFR BLD AUTO: 0.1 % (ref 0.3–6.2)
ERYTHROCYTE [DISTWIDTH] IN BLOOD BY AUTOMATED COUNT: 13 % (ref 11.5–14.5)
GFR SERPL CREATININE-BSD FRML MDRD: 74 ML/MIN/1.73
GLOBULIN UR ELPH-MCNC: 2.6 GM/DL
GLUCOSE BLD-MCNC: 118 MG/DL (ref 65–99)
GLUCOSE UR STRIP-MCNC: NEGATIVE MG/DL
HCT VFR BLD AUTO: 49.3 % (ref 40.4–52.2)
HGB BLD-MCNC: 16 G/DL (ref 13.7–17.6)
HGB UR QL STRIP.AUTO: NEGATIVE
HYALINE CASTS UR QL AUTO: NORMAL /LPF
IMM GRANULOCYTES # BLD: 0.13 10*3/MM3 (ref 0–0.03)
IMM GRANULOCYTES NFR BLD: 1.2 % (ref 0–0.5)
KETONES UR QL STRIP: NEGATIVE
LEUKOCYTE ESTERASE UR QL STRIP.AUTO: NEGATIVE
LYMPHOCYTES # BLD AUTO: 2.73 10*3/MM3 (ref 0.9–4.8)
LYMPHOCYTES NFR BLD AUTO: 25.9 % (ref 19.6–45.3)
MCH RBC QN AUTO: 30.9 PG (ref 27–32.7)
MCHC RBC AUTO-ENTMCNC: 32.5 G/DL (ref 32.6–36.4)
MCV RBC AUTO: 95.4 FL (ref 79.8–96.2)
MONOCYTES # BLD AUTO: 0.87 10*3/MM3 (ref 0.2–1.2)
MONOCYTES NFR BLD AUTO: 8.3 % (ref 5–12)
NEUTROPHILS # BLD AUTO: 6.77 10*3/MM3 (ref 1.9–8.1)
NEUTROPHILS NFR BLD AUTO: 64.3 % (ref 42.7–76)
NITRITE UR QL STRIP: NEGATIVE
PH UR STRIP.AUTO: 6 [PH] (ref 5–8)
PLATELET # BLD AUTO: 219 10*3/MM3 (ref 140–500)
PMV BLD AUTO: 10 FL (ref 6–12)
POTASSIUM BLD-SCNC: 4.9 MMOL/L (ref 3.5–5.2)
PROT SERPL-MCNC: 7.1 G/DL (ref 6–8.5)
PROT UR QL STRIP: NEGATIVE
RBC # BLD AUTO: 5.17 10*6/MM3 (ref 4.6–6)
RBC # UR: NORMAL /HPF
REF LAB TEST METHOD: NORMAL
SODIUM BLD-SCNC: 145 MMOL/L (ref 136–145)
SP GR UR STRIP: 1.03 (ref 1–1.03)
SQUAMOUS #/AREA URNS HPF: NORMAL /HPF
UROBILINOGEN UR QL STRIP: NORMAL
WBC NRBC COR # BLD: 10.53 10*3/MM3 (ref 4.5–10.7)
WBC UR QL AUTO: NORMAL /HPF

## 2018-03-26 PROCEDURE — 81001 URINALYSIS AUTO W/SCOPE: CPT

## 2018-03-26 PROCEDURE — 80053 COMPREHEN METABOLIC PANEL: CPT

## 2018-03-26 PROCEDURE — 93005 ELECTROCARDIOGRAM TRACING: CPT | Performed by: ORTHOPAEDIC SURGERY

## 2018-03-26 PROCEDURE — 71046 X-RAY EXAM CHEST 2 VIEWS: CPT

## 2018-03-26 PROCEDURE — 36415 COLL VENOUS BLD VENIPUNCTURE: CPT

## 2018-03-26 PROCEDURE — 85025 COMPLETE CBC W/AUTO DIFF WBC: CPT

## 2018-03-26 PROCEDURE — 93010 ELECTROCARDIOGRAM REPORT: CPT | Performed by: INTERNAL MEDICINE

## 2018-05-07 ENCOUNTER — OFFICE VISIT (OUTPATIENT)
Dept: FAMILY MEDICINE CLINIC | Facility: CLINIC | Age: 63
End: 2018-05-07

## 2018-05-07 VITALS
SYSTOLIC BLOOD PRESSURE: 120 MMHG | TEMPERATURE: 98 F | HEIGHT: 71 IN | DIASTOLIC BLOOD PRESSURE: 84 MMHG | OXYGEN SATURATION: 95 % | WEIGHT: 232.3 LBS | BODY MASS INDEX: 32.52 KG/M2 | HEART RATE: 77 BPM

## 2018-05-07 DIAGNOSIS — Z00.00 HEALTH CARE MAINTENANCE: Primary | ICD-10-CM

## 2018-05-07 DIAGNOSIS — E78.00 PURE HYPERCHOLESTEROLEMIA: ICD-10-CM

## 2018-05-07 DIAGNOSIS — R73.01 IMPAIRED FASTING GLUCOSE: ICD-10-CM

## 2018-05-07 PROCEDURE — 99396 PREV VISIT EST AGE 40-64: CPT | Performed by: FAMILY MEDICINE

## 2018-05-07 RX ORDER — ASPIRIN 81 MG/1
81 TABLET ORAL DAILY
Status: ON HOLD
Start: 2018-05-07 | End: 2021-07-12 | Stop reason: SDUPTHER

## 2018-05-07 RX ORDER — DICLOFENAC SODIUM 75 MG/1
75 TABLET, DELAYED RELEASE ORAL 2 TIMES DAILY WITH MEALS
Refills: 3 | COMMUNITY
Start: 2018-05-01 | End: 2019-02-27

## 2018-05-07 NOTE — PROGRESS NOTES
Subjective   Nathan Burk is a 63 y.o. male.     Annual Exam (Follow up labs)    History of Present Illness      Hyperlipidemia follow up. He is taking statin medication without complaint. No myopathy symptoms.  We stopped his statin.  The HDL was low.  LDL is high.  Restart his simvastatin.  He does not feel any different.  He's not exercising as much.  He had recent meniscal injury and tear repair through orthopedics month or 2 ago.  He start exercise more.  He states he's about 20 or 30 pounds overweight.  He eats a lot of carbohydrates he states.    Impaired fasting glucose.  110, 118.    Last lipid panel:   Lab Results   Component Value Date    HDL 40 03/08/2018     Lab Results   Component Value Date     (H) 03/08/2018     Lab Results   Component Value Date    TRIG 195 (H) 03/08/2018     Social History   Substance Use Topics   • Smoking status: Former Smoker     Packs/day: 0.50     Years: 20.00   • Smokeless tobacco: Never Used   • Alcohol use Yes      Comment: social     Immunization History   Administered Date(s) Administered   • Pneumococcal Polysaccharide (PPSV23) 02/08/2018   • Tdap 02/08/2018     Family History   Problem Relation Age of Onset   • Colon cancer Father    • Diabetes Mother            The following portions of the patient's history were reviewed and updated as appropriate: allergies, current medications, past family history, past medical history, past social history, past surgical history and problem list.      Review of Systems   Constitutional: Negative.    HENT: Negative.    Respiratory: Negative.    Cardiovascular: Negative.    Gastrointestinal: Negative.  Negative for blood in stool.   Endocrine: Negative.    Genitourinary: Negative.  Negative for hematuria.   Musculoskeletal: Positive for arthralgias.   Skin: Negative.    Neurological: Negative.  Negative for headaches.   Psychiatric/Behavioral: Negative.    All other systems reviewed and are negative.      Objective   Blood  "pressure 120/84, pulse 77, temperature 98 °F (36.7 °C), temperature source Oral, height 180 cm (70.87\"), weight 105 kg (232 lb 4.8 oz), SpO2 95 %.  Physical Exam   Constitutional: He is oriented to person, place, and time. No distress.   HENT:   Head: Normocephalic and atraumatic.   Right Ear: External ear normal.   Left Ear: External ear normal.   Mouth/Throat: Oropharynx is clear and moist.   Eyes: EOM are normal. Pupils are equal, round, and reactive to light.   Neck: Normal range of motion. Neck supple.   Cardiovascular: Normal rate and regular rhythm.    Pulmonary/Chest: Effort normal and breath sounds normal.   Abdominal: Soft. Bowel sounds are normal. He exhibits no distension and no mass. There is no tenderness. There is no guarding. No hernia.   Genitourinary: Prostate normal. Prostate is not enlarged and not tender.   Musculoskeletal: Normal range of motion. He exhibits no edema or tenderness.   Neurological: He is alert and oriented to person, place, and time. He exhibits normal muscle tone. Coordination normal.   Skin: Skin is warm and dry.   Psychiatric: He has a normal mood and affect. His behavior is normal.   Nursing note and vitals reviewed.      Assessment/Plan   Nathan was seen today for annual exam.    Diagnoses and all orders for this visit:    Health care maintenance    Pure hypercholesterolemia  -     Hemoglobin A1c; Future  -     Comprehensive Metabolic Panel; Future  -     Lipid Panel; Future    Impaired fasting glucose  -     Hemoglobin A1c; Future    Other orders  -     aspirin 81 MG EC tablet; Take 1 tablet by mouth Daily.        Annual health maintenance examination.    Immunizations.  Up-to-date.    Hyperlipidemia.  Recommend continuing simvastatin.  Also recommend low-dose aspirin giving his intermediate risk of coronary artery disease.    Impaired fasting glucose.  A1c ordered prior to next visit along with CMP.  We discussed diet and exercise in great detail pain.    Prostate cancer " screening up-to-date.    Colon cancer screening up-to-date, he is currently now getting every 3 year colonoscopies because of the previous history of polyps and family history of father dying of colon cancer.

## 2018-08-05 ENCOUNTER — RESULTS ENCOUNTER (OUTPATIENT)
Dept: FAMILY MEDICINE CLINIC | Facility: CLINIC | Age: 63
End: 2018-08-05

## 2018-08-05 DIAGNOSIS — R73.01 IMPAIRED FASTING GLUCOSE: ICD-10-CM

## 2018-08-05 DIAGNOSIS — E78.00 PURE HYPERCHOLESTEROLEMIA: ICD-10-CM

## 2018-10-01 RX ORDER — SIMVASTATIN 40 MG
40 TABLET ORAL NIGHTLY
Qty: 90 TABLET | Refills: 1 | Status: SHIPPED | OUTPATIENT
Start: 2018-10-01 | End: 2018-11-13

## 2018-11-05 LAB
ALBUMIN SERPL-MCNC: 4.4 G/DL (ref 3.5–5.2)
ALBUMIN/GLOB SERPL: 1.9 G/DL
ALP SERPL-CCNC: 60 U/L (ref 39–117)
ALT SERPL-CCNC: 51 U/L (ref 1–41)
AST SERPL-CCNC: 26 U/L (ref 1–40)
BILIRUB SERPL-MCNC: 0.4 MG/DL (ref 0.1–1.2)
BUN SERPL-MCNC: 18 MG/DL (ref 8–23)
BUN/CREAT SERPL: 23.7 (ref 7–25)
CALCIUM SERPL-MCNC: 9.5 MG/DL (ref 8.6–10.5)
CHLORIDE SERPL-SCNC: 103 MMOL/L (ref 98–107)
CHOLEST SERPL-MCNC: 219 MG/DL (ref 0–200)
CO2 SERPL-SCNC: 31.7 MMOL/L (ref 22–29)
CREAT SERPL-MCNC: 0.76 MG/DL (ref 0.76–1.27)
GLOBULIN SER CALC-MCNC: 2.3 GM/DL
GLUCOSE SERPL-MCNC: 112 MG/DL (ref 65–99)
HBA1C MFR BLD: 5.91 % (ref 4.8–5.6)
HDLC SERPL-MCNC: 42 MG/DL (ref 40–60)
LDLC SERPL CALC-MCNC: 131 MG/DL (ref 0–100)
POTASSIUM SERPL-SCNC: 4.6 MMOL/L (ref 3.5–5.2)
PROT SERPL-MCNC: 6.7 G/DL (ref 6–8.5)
SODIUM SERPL-SCNC: 142 MMOL/L (ref 136–145)
TRIGL SERPL-MCNC: 230 MG/DL (ref 0–150)
VLDLC SERPL CALC-MCNC: 46 MG/DL (ref 5–40)

## 2018-11-13 ENCOUNTER — OFFICE VISIT (OUTPATIENT)
Dept: FAMILY MEDICINE CLINIC | Facility: CLINIC | Age: 63
End: 2018-11-13

## 2018-11-13 VITALS
HEIGHT: 71 IN | OXYGEN SATURATION: 95 % | BODY MASS INDEX: 32.16 KG/M2 | TEMPERATURE: 97.2 F | WEIGHT: 229.7 LBS | HEART RATE: 76 BPM | SYSTOLIC BLOOD PRESSURE: 134 MMHG | DIASTOLIC BLOOD PRESSURE: 89 MMHG

## 2018-11-13 DIAGNOSIS — R73.01 IMPAIRED FASTING GLUCOSE: ICD-10-CM

## 2018-11-13 DIAGNOSIS — K76.0 NAFLD (NONALCOHOLIC FATTY LIVER DISEASE): ICD-10-CM

## 2018-11-13 DIAGNOSIS — E78.00 PURE HYPERCHOLESTEROLEMIA: Primary | ICD-10-CM

## 2018-11-13 PROCEDURE — 99214 OFFICE O/P EST MOD 30 MIN: CPT | Performed by: FAMILY MEDICINE

## 2018-11-13 RX ORDER — ATORVASTATIN CALCIUM 40 MG/1
40 TABLET, FILM COATED ORAL DAILY
Qty: 90 TABLET | Refills: 1 | Status: SHIPPED | OUTPATIENT
Start: 2018-11-13 | End: 2019-05-14 | Stop reason: SDUPTHER

## 2018-11-13 NOTE — PROGRESS NOTES
"Subjective   Nathan Burk is a 63 y.o. male.     Hyperlipidemia (follow up labs)    History of Present Illness    Hyperlipidemia follow up. He is taking statin medication without complaint. No myopathy symptoms.  We had restarted his simvastatin some months ago.  LDL still relatively high.    Last lipid panel:   Lab Results   Component Value Date    HDL 42 11/05/2018     Lab Results   Component Value Date     (H) 11/05/2018     Lab Results   Component Value Date    TRIG 230 (H) 11/05/2018     Impaired fasting glucose.  Recent glucose improved.     ALT remains mildly elevated .... Recently 51.  Similar ratio compared to previous.  Minimal alcohol use.  Maybe 3 drinks on weekends.  No gallbladder trouble.  No pain.    Social History     Tobacco Use   • Smoking status: Former Smoker     Packs/day: 0.50     Years: 20.00     Pack years: 10.00   • Smokeless tobacco: Never Used   Substance Use Topics   • Alcohol use: Yes     Comment: social   • Drug use: No       The following portions of the patient's history were reviewed and updated as appropriate: allergies, current medications, past family history, past medical history, past social history, past surgical history and problem list.      Review of Systems   Constitutional: Negative.    Respiratory: Negative.    Cardiovascular: Negative.    Musculoskeletal: Negative.        Objective   Blood pressure 134/89, pulse 76, temperature 97.2 °F (36.2 °C), temperature source Oral, height 180 cm (70.87\"), weight 104 kg (229 lb 11.2 oz), SpO2 95 %.  Physical Exam   Constitutional: He appears well-developed and well-nourished. No distress.   Neck: No thyromegaly present.   Cardiovascular: Normal rate, regular rhythm, normal heart sounds and intact distal pulses.   Pulmonary/Chest: Effort normal and breath sounds normal.   Musculoskeletal: He exhibits no edema.   Skin: Skin is warm and dry.   Psychiatric: He has a normal mood and affect. His behavior is normal. Judgment and " thought content normal.   Nursing note and vitals reviewed.      Assessment/Plan   Nathan was seen today for hyperlipidemia.    Diagnoses and all orders for this visit:    Pure hypercholesterolemia    Impaired fasting glucose    Hyperlipidemia.  LDL needs improvement.  Switching from simvastatin 40 mg a and change it to atorvastatin 40 mg a day.  Follow-up within 3 or 4 months.  Lab work prior.    Impaired fasting glucose.  Moderately improved.  He has decreased his carbohydrates.  He is having trouble with exercise because of knee arthritis.  Followed by orthopedics.    Very probable nonalcohol fatty liver disease.  Discussed need for low carbohydrate diet, weight loss, and exercise.  Checking hepatitis C screen and near future.

## 2018-11-18 ENCOUNTER — RESULTS ENCOUNTER (OUTPATIENT)
Dept: FAMILY MEDICINE CLINIC | Facility: CLINIC | Age: 63
End: 2018-11-18

## 2018-11-18 DIAGNOSIS — E78.00 PURE HYPERCHOLESTEROLEMIA: ICD-10-CM

## 2018-11-18 DIAGNOSIS — K76.0 NAFLD (NONALCOHOLIC FATTY LIVER DISEASE): ICD-10-CM

## 2018-11-18 DIAGNOSIS — R73.01 IMPAIRED FASTING GLUCOSE: ICD-10-CM

## 2018-12-10 ENCOUNTER — LAB (OUTPATIENT)
Dept: FAMILY MEDICINE CLINIC | Facility: CLINIC | Age: 63
End: 2018-12-10

## 2018-12-10 DIAGNOSIS — R73.01 IMPAIRED FASTING GLUCOSE: ICD-10-CM

## 2018-12-10 DIAGNOSIS — E78.00 PURE HYPERCHOLESTEROLEMIA: ICD-10-CM

## 2019-01-16 ENCOUNTER — HOSPITAL ENCOUNTER (OUTPATIENT)
Dept: CARDIOLOGY | Facility: HOSPITAL | Age: 64
Discharge: HOME | End: 2019-01-16
Attending: ORTHOPAEDIC SURGERY | Admitting: ORTHOPAEDIC SURGERY

## 2019-01-16 ENCOUNTER — LAB (OUTPATIENT)
Dept: LAB | Facility: HOSPITAL | Age: 64
End: 2019-01-16
Attending: ORTHOPAEDIC SURGERY

## 2019-01-16 ENCOUNTER — TRANSCRIBE ORDERS (OUTPATIENT)
Dept: ADMINISTRATIVE | Facility: HOSPITAL | Age: 64
End: 2019-01-16

## 2019-01-16 ENCOUNTER — HOSPITAL ENCOUNTER (OUTPATIENT)
Dept: GENERAL RADIOLOGY | Facility: HOSPITAL | Age: 64
Discharge: HOME | End: 2019-01-16
Attending: ORTHOPAEDIC SURGERY

## 2019-01-16 DIAGNOSIS — Z01.818 PRE-OP TESTING: ICD-10-CM

## 2019-01-16 DIAGNOSIS — Z01.818 PREOP TESTING: ICD-10-CM

## 2019-01-16 DIAGNOSIS — Z01.818 PREOP TESTING: Primary | ICD-10-CM

## 2019-01-16 LAB
ALBUMIN SERPL-MCNC: 4.4 G/DL (ref 3.5–5.2)
ALBUMIN/GLOB SERPL: 1.8 G/DL
ALP SERPL-CCNC: 66 U/L (ref 39–117)
ALT SERPL W P-5'-P-CCNC: 34 U/L (ref 1–41)
ANION GAP SERPL CALCULATED.3IONS-SCNC: 11.8 MMOL/L
AST SERPL-CCNC: 19 U/L (ref 1–40)
BACTERIA UR QL AUTO: ABNORMAL /HPF
BASOPHILS # BLD AUTO: 0.03 10*3/MM3 (ref 0–0.2)
BASOPHILS NFR BLD AUTO: 0.3 % (ref 0–1.5)
BILIRUB SERPL-MCNC: 0.4 MG/DL (ref 0.1–1.2)
BILIRUB UR QL STRIP: NEGATIVE
BUN BLD-MCNC: 20 MG/DL (ref 8–23)
BUN/CREAT SERPL: 22.5 (ref 7–25)
CALCIUM SPEC-SCNC: 9.4 MG/DL (ref 8.6–10.5)
CHLORIDE SERPL-SCNC: 103 MMOL/L (ref 98–107)
CLARITY UR: CLEAR
CO2 SERPL-SCNC: 27.2 MMOL/L (ref 22–29)
COLOR UR: YELLOW
CREAT BLD-MCNC: 0.89 MG/DL (ref 0.76–1.27)
DEPRECATED RDW RBC AUTO: 43.9 FL (ref 37–54)
EOSINOPHIL # BLD AUTO: 0.07 10*3/MM3 (ref 0–0.7)
EOSINOPHIL NFR BLD AUTO: 0.8 % (ref 0.3–6.2)
ERYTHROCYTE [DISTWIDTH] IN BLOOD BY AUTOMATED COUNT: 13 % (ref 11.5–14.5)
GFR SERPL CREATININE-BSD FRML MDRD: 87 ML/MIN/1.73
GLOBULIN UR ELPH-MCNC: 2.5 GM/DL
GLUCOSE BLD-MCNC: 108 MG/DL (ref 65–99)
GLUCOSE UR STRIP-MCNC: NEGATIVE MG/DL
HCT VFR BLD AUTO: 48.8 % (ref 40.4–52.2)
HGB BLD-MCNC: 16.5 G/DL (ref 13.7–17.6)
HGB UR QL STRIP.AUTO: NEGATIVE
HYALINE CASTS UR QL AUTO: ABNORMAL /LPF
IMM GRANULOCYTES # BLD AUTO: 0.07 10*3/MM3 (ref 0–0.03)
IMM GRANULOCYTES NFR BLD AUTO: 0.8 % (ref 0–0.5)
KETONES UR QL STRIP: NEGATIVE
LEUKOCYTE ESTERASE UR QL STRIP.AUTO: ABNORMAL
LYMPHOCYTES # BLD AUTO: 3.17 10*3/MM3 (ref 0.9–4.8)
LYMPHOCYTES NFR BLD AUTO: 35.4 % (ref 19.6–45.3)
MCH RBC QN AUTO: 31.3 PG (ref 27–32.7)
MCHC RBC AUTO-ENTMCNC: 33.8 G/DL (ref 32.6–36.4)
MCV RBC AUTO: 92.4 FL (ref 79.8–96.2)
MONOCYTES # BLD AUTO: 0.81 10*3/MM3 (ref 0.2–1.2)
MONOCYTES NFR BLD AUTO: 9.1 % (ref 5–12)
NEUTROPHILS # BLD AUTO: 4.87 10*3/MM3 (ref 1.9–8.1)
NEUTROPHILS NFR BLD AUTO: 54.4 % (ref 42.7–76)
NITRITE UR QL STRIP: NEGATIVE
PH UR STRIP.AUTO: 6 [PH] (ref 5–8)
PLATELET # BLD AUTO: 228 10*3/MM3 (ref 140–500)
PMV BLD AUTO: 9.8 FL (ref 6–12)
POTASSIUM BLD-SCNC: 3.9 MMOL/L (ref 3.5–5.2)
PROT SERPL-MCNC: 6.9 G/DL (ref 6–8.5)
PROT UR QL STRIP: NEGATIVE
RBC # BLD AUTO: 5.28 10*6/MM3 (ref 4.6–6)
RBC # UR: ABNORMAL /HPF
REF LAB TEST METHOD: ABNORMAL
SODIUM BLD-SCNC: 142 MMOL/L (ref 136–145)
SP GR UR STRIP: 1.02 (ref 1–1.03)
SQUAMOUS #/AREA URNS HPF: ABNORMAL /HPF
UROBILINOGEN UR QL STRIP: ABNORMAL
WBC NRBC COR # BLD: 8.95 10*3/MM3 (ref 4.5–10.7)
WBC UR QL AUTO: ABNORMAL /HPF

## 2019-01-16 PROCEDURE — 85025 COMPLETE CBC W/AUTO DIFF WBC: CPT

## 2019-01-16 PROCEDURE — 80053 COMPREHEN METABOLIC PANEL: CPT

## 2019-01-16 PROCEDURE — 71046 X-RAY EXAM CHEST 2 VIEWS: CPT

## 2019-01-16 PROCEDURE — 93010 ELECTROCARDIOGRAM REPORT: CPT | Performed by: INTERNAL MEDICINE

## 2019-01-16 PROCEDURE — 81001 URINALYSIS AUTO W/SCOPE: CPT

## 2019-01-16 PROCEDURE — 93005 ELECTROCARDIOGRAM TRACING: CPT | Performed by: ORTHOPAEDIC SURGERY

## 2019-01-16 PROCEDURE — 36415 COLL VENOUS BLD VENIPUNCTURE: CPT

## 2019-02-21 LAB
ALBUMIN SERPL-MCNC: 4.6 G/DL (ref 3.6–4.8)
ALBUMIN/GLOB SERPL: 2.2 {RATIO} (ref 1.2–2.2)
ALP SERPL-CCNC: 89 IU/L (ref 39–117)
ALT SERPL-CCNC: 28 IU/L (ref 0–44)
AST SERPL-CCNC: 26 IU/L (ref 0–40)
BILIRUB SERPL-MCNC: 0.4 MG/DL (ref 0–1.2)
BUN SERPL-MCNC: 21 MG/DL (ref 8–27)
BUN/CREAT SERPL: 24 (ref 10–24)
CALCIUM SERPL-MCNC: 9.8 MG/DL (ref 8.6–10.2)
CHLORIDE SERPL-SCNC: 103 MMOL/L (ref 96–106)
CHOLEST SERPL-MCNC: 158 MG/DL (ref 100–199)
CO2 SERPL-SCNC: 21 MMOL/L (ref 20–29)
CREAT SERPL-MCNC: 0.86 MG/DL (ref 0.76–1.27)
GLOBULIN SER CALC-MCNC: 2.1 G/DL (ref 1.5–4.5)
GLUCOSE SERPL-MCNC: 97 MG/DL (ref 65–99)
HBA1C MFR BLD: 5.5 % (ref 4.8–5.6)
HDLC SERPL-MCNC: 32 MG/DL
LDLC SERPL CALC-MCNC: 74 MG/DL (ref 0–99)
POTASSIUM SERPL-SCNC: 4.4 MMOL/L (ref 3.5–5.2)
PROT SERPL-MCNC: 6.7 G/DL (ref 6–8.5)
SODIUM SERPL-SCNC: 141 MMOL/L (ref 134–144)
TRIGL SERPL-MCNC: 262 MG/DL (ref 0–149)
VLDLC SERPL CALC-MCNC: 52 MG/DL (ref 5–40)

## 2019-02-27 ENCOUNTER — OFFICE VISIT (OUTPATIENT)
Dept: FAMILY MEDICINE CLINIC | Facility: CLINIC | Age: 64
End: 2019-02-27

## 2019-02-27 VITALS
OXYGEN SATURATION: 96 % | HEART RATE: 74 BPM | SYSTOLIC BLOOD PRESSURE: 126 MMHG | HEIGHT: 71 IN | DIASTOLIC BLOOD PRESSURE: 78 MMHG | WEIGHT: 234.5 LBS | BODY MASS INDEX: 32.83 KG/M2 | TEMPERATURE: 97.5 F

## 2019-02-27 DIAGNOSIS — R73.01 IMPAIRED FASTING GLUCOSE: ICD-10-CM

## 2019-02-27 DIAGNOSIS — E78.00 PURE HYPERCHOLESTEROLEMIA: Primary | ICD-10-CM

## 2019-02-27 DIAGNOSIS — Z12.5 SCREENING PSA (PROSTATE SPECIFIC ANTIGEN): ICD-10-CM

## 2019-02-27 DIAGNOSIS — Z00.00 HEALTH CARE MAINTENANCE: ICD-10-CM

## 2019-02-27 DIAGNOSIS — K76.0 NAFLD (NONALCOHOLIC FATTY LIVER DISEASE): ICD-10-CM

## 2019-02-27 PROCEDURE — 99214 OFFICE O/P EST MOD 30 MIN: CPT | Performed by: FAMILY MEDICINE

## 2019-02-27 NOTE — PROGRESS NOTES
"Subjective   Nathan Burk is a 64 y.o. male.     Chief Complaint   Patient presents with   • Hyperlipidemia     follow up labs        History of Present Illness    Hyperlipidemia.  We switched from simvastatin to atorvastatin last visit.  Lipid panel is improved including a lower LDL.  HDL is also lowered though.    Nonalcohol fatty liver syndrome.  The liver enzymes are improved as the cholesterol has improved on the higher dose of atorvastatin.  He had been drinking maybe 2 or 3 drinks a week.  He stopped about a month ago with the surgery, see below.    Impaired fasting glucose.  The fasting blood sugar is now normal.  Less than 100.  Previous maximum is 118.  The A1c is now normalized.  Previously 5.9 now 5.5%.  Weight appears unchanged.  Weight is up slightly.  He states he has decreased pasta and other carbohydrates.    Osteoarthritis of the right knee.  Recent total knee replacement.  He continues with physical therapy.  About 5 weeks out now.  Some pain but that is improving.  He is planning getting the left knee done.      The following portions of the patient's history were reviewed and updated as appropriate: allergies, current medications, past family history, past medical history, past social history, past surgical history and problem list.          Review of Systems   Constitutional: Negative.    Respiratory: Negative.    Cardiovascular: Negative.    Musculoskeletal: Positive for joint swelling.       Objective   Blood pressure 126/78, pulse 74, temperature 97.5 °F (36.4 °C), temperature source Oral, height 180 cm (70.87\"), weight 106 kg (234 lb 8 oz), SpO2 96 %.  Physical Exam   Constitutional: He appears well-developed and well-nourished. No distress.   Neck: No thyromegaly present.   Cardiovascular: Normal rate, regular rhythm, normal heart sounds and intact distal pulses.   Pulmonary/Chest: Effort normal and breath sounds normal.   Musculoskeletal: He exhibits no edema.   Right knee reveals a " anterior surgical wound that appears to be healing well.  Minimal swelling.  Nearly full range of motion.   Skin: Skin is warm and dry.   Psychiatric: He has a normal mood and affect. His behavior is normal. Judgment and thought content normal.   Nursing note and vitals reviewed.      Assessment/Plan   Nathan was seen today for hyperlipidemia.    Diagnoses and all orders for this visit:    Pure hypercholesterolemia    Impaired fasting glucose    NAFLD (nonalcoholic fatty liver disease)      Hyperlipidemia.  Improved.  Combination of lower carbohydrates and change to atorvastatin.  LDL has improved.  No need to increase exercise as his knee heals, to help his HDL cholesterol.  I will see him back in 6 months for annual wellness visit.    Impaired fasting glucose.  Much improved.    Elevated liver enzymes.  Probable nonalcoholic fatty liver syndrome.  Liver enzymes are now normalized.  We will continue to monitor.    Recent total knee replacement.  He is doing well with physical therapy.  He is planning having the left knee done this summer sometime.

## 2019-05-14 RX ORDER — ATORVASTATIN CALCIUM 40 MG/1
TABLET, FILM COATED ORAL
Qty: 90 TABLET | Refills: 1 | Status: SHIPPED | OUTPATIENT
Start: 2019-05-14 | End: 2019-12-11 | Stop reason: SDUPTHER

## 2019-05-28 ENCOUNTER — RESULTS ENCOUNTER (OUTPATIENT)
Dept: FAMILY MEDICINE CLINIC | Facility: CLINIC | Age: 64
End: 2019-05-28

## 2019-05-28 DIAGNOSIS — K76.0 NAFLD (NONALCOHOLIC FATTY LIVER DISEASE): ICD-10-CM

## 2019-05-28 DIAGNOSIS — R73.01 IMPAIRED FASTING GLUCOSE: ICD-10-CM

## 2019-05-28 DIAGNOSIS — Z00.00 HEALTH CARE MAINTENANCE: ICD-10-CM

## 2019-05-28 DIAGNOSIS — E78.00 PURE HYPERCHOLESTEROLEMIA: ICD-10-CM

## 2019-05-28 DIAGNOSIS — Z12.5 SCREENING PSA (PROSTATE SPECIFIC ANTIGEN): ICD-10-CM

## 2019-08-22 LAB
ALBUMIN SERPL-MCNC: 4.9 G/DL (ref 3.5–5.2)
ALBUMIN/GLOB SERPL: 2.3 G/DL
ALP SERPL-CCNC: 95 U/L (ref 39–117)
ALT SERPL-CCNC: 34 U/L (ref 1–41)
AST SERPL-CCNC: 24 U/L (ref 1–40)
BASOPHILS # BLD AUTO: 0.04 10*3/MM3 (ref 0–0.2)
BASOPHILS NFR BLD AUTO: 0.5 % (ref 0–1.5)
BILIRUB SERPL-MCNC: 0.4 MG/DL (ref 0.2–1.2)
BUN SERPL-MCNC: 22 MG/DL (ref 8–23)
BUN/CREAT SERPL: 28.2 (ref 7–25)
CALCIUM SERPL-MCNC: 9.7 MG/DL (ref 8.6–10.5)
CHLORIDE SERPL-SCNC: 100 MMOL/L (ref 98–107)
CHOLEST SERPL-MCNC: 171 MG/DL (ref 0–200)
CO2 SERPL-SCNC: 26.8 MMOL/L (ref 22–29)
CREAT SERPL-MCNC: 0.78 MG/DL (ref 0.76–1.27)
EOSINOPHIL # BLD AUTO: 0.04 10*3/MM3 (ref 0–0.4)
EOSINOPHIL NFR BLD AUTO: 0.5 % (ref 0.3–6.2)
ERYTHROCYTE [DISTWIDTH] IN BLOOD BY AUTOMATED COUNT: 12.8 % (ref 12.3–15.4)
GLOBULIN SER CALC-MCNC: 2.1 GM/DL
GLUCOSE SERPL-MCNC: 109 MG/DL (ref 65–99)
HBA1C MFR BLD: 5.5 % (ref 4.8–5.6)
HCT VFR BLD AUTO: 52.4 % (ref 37.5–51)
HDLC SERPL-MCNC: 47 MG/DL (ref 40–60)
HGB BLD-MCNC: 16.5 G/DL (ref 13–17.7)
IMM GRANULOCYTES # BLD AUTO: 0.03 10*3/MM3 (ref 0–0.05)
IMM GRANULOCYTES NFR BLD AUTO: 0.4 % (ref 0–0.5)
LDLC SERPL CALC-MCNC: 102 MG/DL (ref 0–100)
LYMPHOCYTES # BLD AUTO: 1.88 10*3/MM3 (ref 0.7–3.1)
LYMPHOCYTES NFR BLD AUTO: 24.8 % (ref 19.6–45.3)
MCH RBC QN AUTO: 30.4 PG (ref 26.6–33)
MCHC RBC AUTO-ENTMCNC: 31.5 G/DL (ref 31.5–35.7)
MCV RBC AUTO: 96.7 FL (ref 79–97)
MONOCYTES # BLD AUTO: 0.64 10*3/MM3 (ref 0.1–0.9)
MONOCYTES NFR BLD AUTO: 8.4 % (ref 5–12)
NEUTROPHILS # BLD AUTO: 4.96 10*3/MM3 (ref 1.7–7)
NEUTROPHILS NFR BLD AUTO: 65.4 % (ref 42.7–76)
NRBC BLD AUTO-RTO: 0 /100 WBC (ref 0–0.2)
PLATELET # BLD AUTO: 244 10*3/MM3 (ref 140–450)
POTASSIUM SERPL-SCNC: 5.1 MMOL/L (ref 3.5–5.2)
PROT SERPL-MCNC: 7 G/DL (ref 6–8.5)
PSA SERPL-MCNC: 0.43 NG/ML (ref 0–4)
RBC # BLD AUTO: 5.42 10*6/MM3 (ref 4.14–5.8)
SODIUM SERPL-SCNC: 142 MMOL/L (ref 136–145)
TRIGL SERPL-MCNC: 110 MG/DL (ref 0–150)
VLDLC SERPL CALC-MCNC: 22 MG/DL
WBC # BLD AUTO: 7.59 10*3/MM3 (ref 3.4–10.8)

## 2019-08-28 ENCOUNTER — OFFICE VISIT (OUTPATIENT)
Dept: FAMILY MEDICINE CLINIC | Facility: CLINIC | Age: 64
End: 2019-08-28

## 2019-08-28 VITALS
DIASTOLIC BLOOD PRESSURE: 85 MMHG | HEART RATE: 66 BPM | SYSTOLIC BLOOD PRESSURE: 127 MMHG | WEIGHT: 226.6 LBS | BODY MASS INDEX: 31.72 KG/M2 | TEMPERATURE: 97.2 F | HEIGHT: 71 IN | OXYGEN SATURATION: 97 %

## 2019-08-28 DIAGNOSIS — K76.0 NAFLD (NONALCOHOLIC FATTY LIVER DISEASE): ICD-10-CM

## 2019-08-28 DIAGNOSIS — E78.00 PURE HYPERCHOLESTEROLEMIA: ICD-10-CM

## 2019-08-28 DIAGNOSIS — J01.10 SUBACUTE FRONTAL SINUSITIS: ICD-10-CM

## 2019-08-28 DIAGNOSIS — R73.01 IMPAIRED FASTING GLUCOSE: ICD-10-CM

## 2019-08-28 DIAGNOSIS — K21.9 GASTROESOPHAGEAL REFLUX DISEASE WITHOUT ESOPHAGITIS: ICD-10-CM

## 2019-08-28 DIAGNOSIS — Z00.00 HEALTH CARE MAINTENANCE: Primary | ICD-10-CM

## 2019-08-28 DIAGNOSIS — Z11.59 NEED FOR HEPATITIS C SCREENING TEST: ICD-10-CM

## 2019-08-28 PROCEDURE — 99396 PREV VISIT EST AGE 40-64: CPT | Performed by: FAMILY MEDICINE

## 2019-08-28 RX ORDER — DUPILUMAB 300 MG/2ML
INJECTION, SOLUTION SUBCUTANEOUS
COMMUNITY
Start: 2019-08-09

## 2019-08-28 RX ORDER — PREDNISONE 20 MG/1
40 TABLET ORAL DAILY
Qty: 10 TABLET | Refills: 0 | Status: SHIPPED | OUTPATIENT
Start: 2019-08-28 | End: 2021-04-05

## 2019-08-28 RX ORDER — MELOXICAM 15 MG/1
15 TABLET ORAL DAILY
Refills: 3 | COMMUNITY
Start: 2019-08-15 | End: 2020-12-01

## 2019-08-28 RX ORDER — AMOXICILLIN AND CLAVULANATE POTASSIUM 875; 125 MG/1; MG/1
1 TABLET, FILM COATED ORAL 2 TIMES DAILY
Qty: 14 TABLET | Refills: 0 | Status: SHIPPED | OUTPATIENT
Start: 2019-08-28 | End: 2020-02-28

## 2019-08-28 NOTE — PROGRESS NOTES
Subjective   Nathan Burk is a 64 y.o. male.     Hyperlipidemia (follow up labs) and Sinus Problem (the last 2 days)    History of Present Illness    Here for annual wellness visit    Hyperlipidemia follow up. He is taking statin medication without complaint. No myopathy symptoms.  His LDL cholesterol has been improved and controlled with the atorvastatin.  Since he had his knee replaced he is exercising more and has lost some weight.  His HDL cholesterol is gone up    Nonalcohol fatty liver syndrome.  The LFTs are now normalized.  Much improved since the weight loss and increased exercise.    Impaired fasting glucose.  Recently 109.  A1c is now normalized.  Previously 5.9.  Due to above.    3 weeks of sinus symptoms.  Pressure.  Now some cough.  No fever.  He has severe allergic rhinitis.  He is followed closely by his allergist.  He has a phone call into his allergist with regards to a possible sinus infection.  He describes increasing frontal sinus pain and pressure with copious purulent drainage.  Some cough.  No shortness of breath.  Sore throat a couple days ago but better now.    Lab Results   Component Value Date    CHLPL 171 08/21/2019    TRIG 110 08/21/2019    HDL 47 08/21/2019     (H) 08/21/2019     Social History     Tobacco Use   • Smoking status: Former Smoker     Packs/day: 0.50     Years: 20.00     Pack years: 10.00   • Smokeless tobacco: Never Used   Substance Use Topics   • Alcohol use: Yes     Comment: social   • Drug use: No     Immunization History   Administered Date(s) Administered   • Flu Vaccine Quad PF >18YRS 09/04/2018   • Pneumococcal Polysaccharide (PPSV23) 02/08/2018   • Tdap 02/08/2018     Family History   Problem Relation Age of Onset   • Colon cancer Father    • Diabetes Mother        The following portions of the patient's history were reviewed and updated as appropriate: allergies, current medications, past family history, past medical history, past social history, past  "surgical history and problem list.      Review of Systems   Constitutional: Negative.  Negative for fatigue and fever.   HENT: Positive for congestion, postnasal drip, sinus pressure and sinus pain.    Respiratory: Positive for cough.    Cardiovascular: Negative.    Gastrointestinal: Negative.  Negative for blood in stool.   Endocrine: Negative.    Genitourinary: Negative.  Negative for hematuria.   Musculoskeletal: Negative.    Skin: Negative.  Negative for rash.   Neurological: Negative.  Negative for headaches.   Psychiatric/Behavioral: Negative.    All other systems reviewed and are negative.      Objective   Blood pressure 127/85, pulse 66, temperature 97.2 °F (36.2 °C), temperature source Oral, height 180 cm (70.87\"), weight 103 kg (226 lb 9.6 oz), SpO2 97 %.  Physical Exam   Constitutional: He is oriented to person, place, and time. No distress.   No acute distress.  Nontoxic.   HENT:   Head: Normocephalic and atraumatic.   Right Ear: Tympanic membrane, external ear and ear canal normal.   Left Ear: Tympanic membrane, external ear and ear canal normal.   Nose: Nose normal.   Mouth/Throat: Oropharynx is clear and moist. No oropharyngeal exudate.   Eyes: Conjunctivae and EOM are normal. Pupils are equal, round, and reactive to light. Right eye exhibits no discharge. Left eye exhibits no discharge. No scleral icterus.   Neck: Normal range of motion. Neck supple.   Cardiovascular: Normal rate and regular rhythm.   Pulmonary/Chest: Effort normal and breath sounds normal. No stridor. No respiratory distress. He has no wheezes. He has no rales.   No tachypnea   Abdominal: Soft. Bowel sounds are normal. He exhibits no distension and no mass. There is no tenderness. There is no guarding. No hernia.   Genitourinary: Prostate normal. Prostate is not enlarged and not tender.   Musculoskeletal: Normal range of motion. He exhibits no edema or tenderness.   Lymphadenopathy:     He has no cervical adenopathy.   Neurological: " He is alert and oriented to person, place, and time. He exhibits normal muscle tone. Coordination normal.   Skin: Skin is warm and dry. No rash noted.   Psychiatric: He has a normal mood and affect. His behavior is normal.   Nursing note and vitals reviewed.      Assessment/Plan   Nathan was seen today for hyperlipidemia and sinus problem.    Diagnoses and all orders for this visit:    Health care maintenance  -     Hemoglobin A1c; Future  -     Comprehensive Metabolic Panel; Future  -     Lipid Panel; Future  -     Hepatitis C Antibody; Future    Pure hypercholesterolemia    Impaired fasting glucose    NAFLD (nonalcoholic fatty liver disease)    Gastroesophageal reflux disease without esophagitis    Need for hepatitis C screening test  -     Hepatitis C Antibody; Future    Subacute frontal sinusitis    Other orders  -     amoxicillin-clavulanate (AUGMENTIN) 875-125 MG per tablet; Take 1 tablet by mouth 2 (Two) Times a Day.  -     predniSONE (DELTASONE) 20 MG tablet; Take 2 tablets by mouth Daily.        Annual wellness visit.    Hyperlipidemia.  Continue atorvastatin.    Impaired fasting glucose.  Much improved with weight loss and exercise.    Nonalcohol fatty liver syndrome.  His LFTs have normalized.  We will continue to monitor.    GERD.  He continues Prevacid.  He has no dysphagia.    Subacute sinusitis.  Viral versus bacterial.  Exacerbated by allergies.  Augmentin for a week.  Prednisone 40 mg a day for 5 days.  Stop with high fever and seek medical attention.    Prostate cancer screening up-to-date.    Colon cancer screening coming up later this year.  He is soon due for his 3-year colonoscopy.  He follows with gastroenterology.    Health safety issues discussed including regular exercise.  I will see him back in 6 months for recheck.    Immunizations.  Recommend hepatitis A vaccination and Shingrix at retail pharmacy.

## 2019-09-02 ENCOUNTER — RESULTS ENCOUNTER (OUTPATIENT)
Dept: FAMILY MEDICINE CLINIC | Facility: CLINIC | Age: 64
End: 2019-09-02

## 2019-09-02 DIAGNOSIS — Z00.00 HEALTH CARE MAINTENANCE: ICD-10-CM

## 2019-09-02 DIAGNOSIS — Z11.59 NEED FOR HEPATITIS C SCREENING TEST: ICD-10-CM

## 2019-10-30 ENCOUNTER — TRANSCRIBE ORDERS (OUTPATIENT)
Dept: ADMINISTRATIVE | Facility: HOSPITAL | Age: 64
End: 2019-10-30

## 2019-10-30 ENCOUNTER — HOSPITAL ENCOUNTER (OUTPATIENT)
Dept: GENERAL RADIOLOGY | Facility: HOSPITAL | Age: 64
Discharge: HOME | End: 2019-10-30
Admitting: INTERNAL MEDICINE

## 2019-10-30 DIAGNOSIS — J40 BRONCHITIS: Primary | ICD-10-CM

## 2019-10-30 DIAGNOSIS — J40 BRONCHITIS: ICD-10-CM

## 2019-10-30 PROCEDURE — 71046 X-RAY EXAM CHEST 2 VIEWS: CPT

## 2019-11-25 ENCOUNTER — HOSPITAL ENCOUNTER (OUTPATIENT)
Dept: CARDIOLOGY | Facility: HOSPITAL | Age: 64
Discharge: HOME | End: 2019-11-25
Admitting: ORTHOPAEDIC SURGERY

## 2019-11-25 ENCOUNTER — TRANSCRIBE ORDERS (OUTPATIENT)
Dept: ADMINISTRATIVE | Facility: HOSPITAL | Age: 64
End: 2019-11-25

## 2019-11-25 ENCOUNTER — LAB (OUTPATIENT)
Dept: LAB | Facility: HOSPITAL | Age: 64
End: 2019-11-25

## 2019-11-25 DIAGNOSIS — Z01.818 PRE-OP EXAM: Primary | ICD-10-CM

## 2019-11-25 DIAGNOSIS — Z01.818 PRE-OP EXAM: ICD-10-CM

## 2019-11-25 LAB
ALBUMIN SERPL-MCNC: 4.8 G/DL (ref 3.5–5.2)
ALBUMIN/GLOB SERPL: 1.9 G/DL
ALP SERPL-CCNC: 76 U/L (ref 39–117)
ALT SERPL W P-5'-P-CCNC: 30 U/L (ref 1–41)
ANION GAP SERPL CALCULATED.3IONS-SCNC: 11.3 MMOL/L (ref 5–15)
AST SERPL-CCNC: 23 U/L (ref 1–40)
BASOPHILS # BLD AUTO: 0.06 10*3/MM3 (ref 0–0.2)
BASOPHILS NFR BLD AUTO: 0.7 % (ref 0–1.5)
BILIRUB SERPL-MCNC: 0.4 MG/DL (ref 0.2–1.2)
BILIRUB UR QL STRIP: NEGATIVE
BUN BLD-MCNC: 14 MG/DL (ref 8–23)
BUN/CREAT SERPL: 15.4 (ref 7–25)
CALCIUM SPEC-SCNC: 9.7 MG/DL (ref 8.6–10.5)
CHLORIDE SERPL-SCNC: 104 MMOL/L (ref 98–107)
CLARITY UR: CLEAR
CO2 SERPL-SCNC: 28.7 MMOL/L (ref 22–29)
COLOR UR: YELLOW
CREAT BLD-MCNC: 0.91 MG/DL (ref 0.76–1.27)
DEPRECATED RDW RBC AUTO: 43.3 FL (ref 37–54)
EOSINOPHIL # BLD AUTO: 0.1 10*3/MM3 (ref 0–0.4)
EOSINOPHIL NFR BLD AUTO: 1.2 % (ref 0.3–6.2)
ERYTHROCYTE [DISTWIDTH] IN BLOOD BY AUTOMATED COUNT: 13.3 % (ref 12.3–15.4)
GFR SERPL CREATININE-BSD FRML MDRD: 84 ML/MIN/1.73
GLOBULIN UR ELPH-MCNC: 2.5 GM/DL
GLUCOSE BLD-MCNC: 114 MG/DL (ref 65–99)
GLUCOSE UR STRIP-MCNC: NEGATIVE MG/DL
HCT VFR BLD AUTO: 46.5 % (ref 37.5–51)
HGB BLD-MCNC: 16.1 G/DL (ref 13–17.7)
HGB UR QL STRIP.AUTO: NEGATIVE
IMM GRANULOCYTES # BLD AUTO: 0.07 10*3/MM3 (ref 0–0.05)
IMM GRANULOCYTES NFR BLD AUTO: 0.8 % (ref 0–0.5)
KETONES UR QL STRIP: NEGATIVE
LEUKOCYTE ESTERASE UR QL STRIP.AUTO: NEGATIVE
LYMPHOCYTES # BLD AUTO: 2.17 10*3/MM3 (ref 0.7–3.1)
LYMPHOCYTES NFR BLD AUTO: 25.2 % (ref 19.6–45.3)
MCH RBC QN AUTO: 31 PG (ref 26.6–33)
MCHC RBC AUTO-ENTMCNC: 34.6 G/DL (ref 31.5–35.7)
MCV RBC AUTO: 89.4 FL (ref 79–97)
MONOCYTES # BLD AUTO: 0.83 10*3/MM3 (ref 0.1–0.9)
MONOCYTES NFR BLD AUTO: 9.7 % (ref 5–12)
NEUTROPHILS # BLD AUTO: 5.37 10*3/MM3 (ref 1.7–7)
NEUTROPHILS NFR BLD AUTO: 62.4 % (ref 42.7–76)
NITRITE UR QL STRIP: NEGATIVE
NRBC BLD AUTO-RTO: 0 /100 WBC (ref 0–0.2)
PH UR STRIP.AUTO: 6 [PH] (ref 5–8)
PLATELET # BLD AUTO: 221 10*3/MM3 (ref 140–450)
PMV BLD AUTO: 9.9 FL (ref 6–12)
POTASSIUM BLD-SCNC: 4.8 MMOL/L (ref 3.5–5.2)
PROT SERPL-MCNC: 7.3 G/DL (ref 6–8.5)
PROT UR QL STRIP: NEGATIVE
RBC # BLD AUTO: 5.2 10*6/MM3 (ref 4.14–5.8)
SODIUM BLD-SCNC: 144 MMOL/L (ref 136–145)
SP GR UR STRIP: 1.02 (ref 1–1.03)
UROBILINOGEN UR QL STRIP: NORMAL
WBC NRBC COR # BLD: 8.6 10*3/MM3 (ref 3.4–10.8)

## 2019-11-25 PROCEDURE — 80053 COMPREHEN METABOLIC PANEL: CPT

## 2019-11-25 PROCEDURE — 85025 COMPLETE CBC W/AUTO DIFF WBC: CPT

## 2019-11-25 PROCEDURE — 81003 URINALYSIS AUTO W/O SCOPE: CPT

## 2019-11-25 PROCEDURE — 93010 ELECTROCARDIOGRAM REPORT: CPT | Performed by: INTERNAL MEDICINE

## 2019-11-25 PROCEDURE — 36415 COLL VENOUS BLD VENIPUNCTURE: CPT

## 2019-11-25 PROCEDURE — 93005 ELECTROCARDIOGRAM TRACING: CPT | Performed by: ORTHOPAEDIC SURGERY

## 2019-11-26 ENCOUNTER — RESULTS ENCOUNTER (OUTPATIENT)
Dept: FAMILY MEDICINE CLINIC | Facility: CLINIC | Age: 64
End: 2019-11-26

## 2019-11-26 DIAGNOSIS — Z00.00 HEALTH CARE MAINTENANCE: ICD-10-CM

## 2019-12-11 RX ORDER — ATORVASTATIN CALCIUM 40 MG/1
TABLET, FILM COATED ORAL
Qty: 90 TABLET | Refills: 1 | Status: SHIPPED | OUTPATIENT
Start: 2019-12-11 | End: 2020-06-15

## 2020-02-28 ENCOUNTER — OFFICE VISIT (OUTPATIENT)
Dept: FAMILY MEDICINE CLINIC | Facility: CLINIC | Age: 65
End: 2020-02-28

## 2020-02-28 VITALS
OXYGEN SATURATION: 95 % | WEIGHT: 239 LBS | BODY MASS INDEX: 33.46 KG/M2 | DIASTOLIC BLOOD PRESSURE: 77 MMHG | HEART RATE: 81 BPM | TEMPERATURE: 98.2 F | RESPIRATION RATE: 16 BRPM | HEIGHT: 71 IN | SYSTOLIC BLOOD PRESSURE: 121 MMHG

## 2020-02-28 DIAGNOSIS — E78.00 PURE HYPERCHOLESTEROLEMIA: Primary | ICD-10-CM

## 2020-02-28 DIAGNOSIS — K76.0 NAFLD (NONALCOHOLIC FATTY LIVER DISEASE): ICD-10-CM

## 2020-02-28 DIAGNOSIS — R73.01 IMPAIRED FASTING GLUCOSE: ICD-10-CM

## 2020-02-28 DIAGNOSIS — R97.20 RISING PSA LEVEL: ICD-10-CM

## 2020-02-28 DIAGNOSIS — Z12.5 SCREENING PSA (PROSTATE SPECIFIC ANTIGEN): ICD-10-CM

## 2020-02-28 PROCEDURE — 99214 OFFICE O/P EST MOD 30 MIN: CPT | Performed by: FAMILY MEDICINE

## 2020-02-28 NOTE — PROGRESS NOTES
"Subjective   Nathan Burk is a 65 y.o. male.     Hyperlipidemia (6 month f/u)    History of Present Illness        Hyperlipidemia follow up. He is taking statin medication without complaint. No myopathy symptoms.     Lab Results   Component Value Date    CHLPL 171 08/21/2019    TRIG 110 08/21/2019    HDL 47 08/21/2019     (H) 08/21/2019     He had lab work done through an insurance physical recently.  Glucose normal 104.  No A1c but fructosamine was normal.  Repeat liver enzymes AST and ALT normal.  Total cholesterol 187, , HDL 41.  CEA normal.  PSA normal at 3.13    Nonalcohol fatty liver syndrome.  His liver enzymes continue to improve after losing weight.  He did gain weight after the total knee replacement recently but he states he started to lose it again.  He started exercise more.    Impaired fasting glucose.  Much improved now.    Rising PSA level.  He has no urogenital symptoms.  No nocturia.  No dysuria.  No blood in the urine.  No frequent urination.  There is no family history of prostate cancer.  His father did have colon cancer.  His colonoscopy is coming up this year.  No recent trauma.    The following portions of the patient's history were reviewed and updated as appropriate: allergies, current medications, past family history, past medical history, past social history, past surgical history and problem list.      Review of Systems   Constitutional: Negative.    Respiratory: Negative.    Cardiovascular: Negative.    Genitourinary: Negative.    Neurological: Negative.    Psychiatric/Behavioral: Negative.        Objective   Blood pressure 121/77, pulse 81, temperature 98.2 °F (36.8 °C), resp. rate 16, height 180 cm (70.87\"), weight 108 kg (239 lb), SpO2 95 %.  Physical Exam   Constitutional: He appears well-developed and well-nourished. No distress.   Neck: No thyromegaly present.   Cardiovascular: Normal rate, regular rhythm, normal heart sounds and intact distal pulses. "   Pulmonary/Chest: Effort normal and breath sounds normal.   Abdominal: Soft. He exhibits no distension and no mass. There is no tenderness. There is no guarding.   Genitourinary:   Genitourinary Comments: Prostate is +1 size.  Smooth.  Symmetric.  No nodules.  No masses.  Nontender.   Musculoskeletal: He exhibits no edema.   Skin: Skin is warm and dry.   Psychiatric: He has a normal mood and affect. His behavior is normal. Judgment and thought content normal.   Nursing note and vitals reviewed.      Assessment/Plan   Nathan was seen today for hyperlipidemia.    Diagnoses and all orders for this visit:    Pure hypercholesterolemia  -     Comprehensive Metabolic Panel; Future    NAFLD (nonalcoholic fatty liver disease)  -     Comprehensive Metabolic Panel; Future    Impaired fasting glucose  -     Hemoglobin A1c; Future    Rising PSA level  -     PSA Screen; Future    Screening PSA (prostate specific antigen)  -     PSA Screen; Future      Hyperlipidemia.  Continue atorvastatin.  See me in 6 months for annual wellness visit and recheck.    Nonalcohol fatty liver syndrome.  Much improved with weight loss.    Impaired fasting glucose.  Improved.    Rising PSA level.  From less than 1 up to 3.13 recently on life insurance physical.  Unremarkable prostate examination today.  No concerning urogenital symptoms.  I recommend repeating the level in 3 months.  If getting higher, will need further investigation with urology.

## 2020-05-21 DIAGNOSIS — E78.00 PURE HYPERCHOLESTEROLEMIA: ICD-10-CM

## 2020-05-21 DIAGNOSIS — Z12.5 SCREENING PSA (PROSTATE SPECIFIC ANTIGEN): ICD-10-CM

## 2020-05-21 DIAGNOSIS — R73.01 IMPAIRED FASTING GLUCOSE: Primary | ICD-10-CM

## 2020-05-28 ENCOUNTER — RESULTS ENCOUNTER (OUTPATIENT)
Dept: FAMILY MEDICINE CLINIC | Facility: CLINIC | Age: 65
End: 2020-05-28

## 2020-05-28 DIAGNOSIS — K76.0 NAFLD (NONALCOHOLIC FATTY LIVER DISEASE): ICD-10-CM

## 2020-05-28 DIAGNOSIS — Z12.5 SCREENING PSA (PROSTATE SPECIFIC ANTIGEN): ICD-10-CM

## 2020-05-28 DIAGNOSIS — E78.00 PURE HYPERCHOLESTEROLEMIA: ICD-10-CM

## 2020-05-28 DIAGNOSIS — R97.20 RISING PSA LEVEL: ICD-10-CM

## 2020-05-28 DIAGNOSIS — R73.01 IMPAIRED FASTING GLUCOSE: ICD-10-CM

## 2020-06-08 ENCOUNTER — LAB (OUTPATIENT)
Dept: LAB | Facility: HOSPITAL | Age: 65
End: 2020-06-08

## 2020-06-08 DIAGNOSIS — E78.00 PURE HYPERCHOLESTEROLEMIA: ICD-10-CM

## 2020-06-08 DIAGNOSIS — R73.01 IMPAIRED FASTING GLUCOSE: ICD-10-CM

## 2020-06-08 LAB
ALBUMIN SERPL-MCNC: 4.2 G/DL (ref 3.5–5.2)
ALBUMIN/GLOB SERPL: 1.8 G/DL
ALP SERPL-CCNC: 76 U/L (ref 39–117)
ALT SERPL W P-5'-P-CCNC: 25 U/L (ref 1–41)
ANION GAP SERPL CALCULATED.3IONS-SCNC: 10.5 MMOL/L (ref 5–15)
AST SERPL-CCNC: 22 U/L (ref 1–40)
BILIRUB SERPL-MCNC: 0.6 MG/DL (ref 0.1–1.2)
BUN BLD-MCNC: 17 MG/DL (ref 8–23)
BUN/CREAT SERPL: 18.9 (ref 7–25)
CALCIUM SPEC-SCNC: 9 MG/DL (ref 8.6–10.5)
CHLORIDE SERPL-SCNC: 106 MMOL/L (ref 98–107)
CHOLEST SERPL-MCNC: 139 MG/DL (ref 0–200)
CO2 SERPL-SCNC: 23.5 MMOL/L (ref 22–29)
CREAT BLD-MCNC: 0.9 MG/DL (ref 0.76–1.27)
GFR SERPL CREATININE-BSD FRML MDRD: 85 ML/MIN/1.73
GLOBULIN UR ELPH-MCNC: 2.3 GM/DL
GLUCOSE BLD-MCNC: 111 MG/DL (ref 65–99)
HBA1C MFR BLD: 6.37 % (ref 4.8–5.6)
HDLC SERPL-MCNC: 36 MG/DL (ref 40–60)
LDLC SERPL CALC-MCNC: 77 MG/DL (ref 0–100)
LDLC/HDLC SERPL: 2.15 {RATIO}
POTASSIUM BLD-SCNC: 4 MMOL/L (ref 3.5–5.2)
PROT SERPL-MCNC: 6.5 G/DL (ref 6–8.5)
PSA SERPL-MCNC: 0.77 NG/ML (ref 0–4)
SODIUM BLD-SCNC: 140 MMOL/L (ref 136–145)
TRIGL SERPL-MCNC: 128 MG/DL (ref 0–150)
VLDLC SERPL-MCNC: 25.6 MG/DL (ref 5–40)

## 2020-06-08 PROCEDURE — 80053 COMPREHEN METABOLIC PANEL: CPT | Performed by: FAMILY MEDICINE

## 2020-06-08 PROCEDURE — G0103 PSA SCREENING: HCPCS | Performed by: FAMILY MEDICINE

## 2020-06-08 PROCEDURE — 83036 HEMOGLOBIN GLYCOSYLATED A1C: CPT | Performed by: FAMILY MEDICINE

## 2020-06-08 PROCEDURE — 80061 LIPID PANEL: CPT | Performed by: FAMILY MEDICINE

## 2020-06-08 PROCEDURE — 36415 COLL VENOUS BLD VENIPUNCTURE: CPT | Performed by: FAMILY MEDICINE

## 2020-06-08 NOTE — PROGRESS NOTES
The A1c average blood sugar test is moderately elevated.  We will discuss more at upcoming visit later this summer.

## 2020-06-15 RX ORDER — ATORVASTATIN CALCIUM 40 MG/1
TABLET, FILM COATED ORAL
Qty: 90 TABLET | Refills: 1 | Status: SHIPPED | OUTPATIENT
Start: 2020-06-15 | End: 2020-07-27 | Stop reason: SDUPTHER

## 2020-07-27 RX ORDER — ATORVASTATIN CALCIUM 40 MG/1
40 TABLET, FILM COATED ORAL DAILY
Qty: 90 TABLET | Refills: 1 | Status: SHIPPED | OUTPATIENT
Start: 2020-07-27 | End: 2021-01-25

## 2020-08-19 ENCOUNTER — RESULTS ENCOUNTER (OUTPATIENT)
Dept: FAMILY MEDICINE CLINIC | Facility: CLINIC | Age: 65
End: 2020-08-19

## 2020-08-19 DIAGNOSIS — R73.01 IMPAIRED FASTING GLUCOSE: ICD-10-CM

## 2020-08-19 DIAGNOSIS — E78.00 PURE HYPERCHOLESTEROLEMIA: ICD-10-CM

## 2020-08-28 ENCOUNTER — TELEMEDICINE (OUTPATIENT)
Dept: FAMILY MEDICINE CLINIC | Facility: CLINIC | Age: 65
End: 2020-08-28

## 2020-08-28 DIAGNOSIS — R73.01 IMPAIRED FASTING GLUCOSE: ICD-10-CM

## 2020-08-28 DIAGNOSIS — E78.00 PURE HYPERCHOLESTEROLEMIA: Primary | ICD-10-CM

## 2020-08-28 DIAGNOSIS — Z00.00 HEALTH CARE MAINTENANCE: ICD-10-CM

## 2020-08-28 DIAGNOSIS — Z12.5 SCREENING PSA (PROSTATE SPECIFIC ANTIGEN): ICD-10-CM

## 2020-08-28 PROCEDURE — 99213 OFFICE O/P EST LOW 20 MIN: CPT | Performed by: FAMILY MEDICINE

## 2020-08-28 NOTE — PROGRESS NOTES
Subjective   Nathan Burk is a 65 y.o. male.     Chief Complaint   Patient presents with   • Hyperlipidemia        History of Present Illness     Coronavirus pandemic telehealth visit.  Good audio and video connection.  Face time.  Patient gave informed consent through the [employer] check in process.    You have chosen to receive care through a telehealth visit.  Do you consent to use a video/audio connection for your medical care today? Yes      Hyperlipidemia.  He continues atorvastatin 40 mg a day.  His recent lipid panel in June was proved.  No myopathy symptoms or other complaints.    Impaired fasting glucose.  The fasting glucose itself is stable at 111.  However the A1c jumped to 6.3%.  Patient states this is related to COVID lockdown.  He was taking a lot of sweets and desserts.  Both for himself and relatives.  He states that has stopped.  His weight is been stable now.    Previous accelerated PSA at 3.1 previously 0.7.  The elevation was on a life insurance physical.  Repeat in June is back to normal.  He did go to a urologist.  Unremarkable exam findings reportedly.  Patient has no complaints of urogenital symptoms.          The following portions of the patient's history were reviewed and updated as appropriate: allergies, current medications, past family history, past medical history, past social history, past surgical history and problem list.          Review of Systems   Constitutional: Negative.    Respiratory: Negative.    Cardiovascular: Negative.    Musculoskeletal: Negative.        Objective   There were no vitals taken for this visit.  Physical Exam   Constitutional: He is oriented to person, place, and time. No distress.   HENT:   Head: Atraumatic.   Pulmonary/Chest: Effort normal. No respiratory distress.   He is able to speak in complete sentences without shortness of breath.   Neurological: He is alert and oriented to person, place, and time. Coordination normal.   Skin: No pallor.    Psychiatric: He has a normal mood and affect. His behavior is normal.       Assessment/Plan   Nathan was seen today for hyperlipidemia.    Diagnoses and all orders for this visit:    Pure hypercholesterolemia  -     Comprehensive Metabolic Panel; Future  -     Lipid Panel; Future    Impaired fasting glucose  -     Hemoglobin A1c; Future  -     Comprehensive Metabolic Panel; Future  -     Lipid Panel; Future    Health care maintenance  -     Hemoglobin A1c; Future  -     Comprehensive Metabolic Panel; Future  -     Lipid Panel; Future  -     PSA Screen; Future    Screening PSA (prostate specific antigen)  -     PSA Screen; Future      Hyperlipidemia.  Improved lipid panel.  I will see him back in 6 months for complete physical examination with lab work prior.    Impaired fasting glucose.  Slightly higher A1c due to eating a lot of sweets during the COVID lockdown but the impaired fasting glucose itself has improved.  We will recheck prior to next visit.  He is now staying off the sweets.  His weight is been stable.  He will call with questions.    Duration of visit 12 minutes.

## 2020-11-26 ENCOUNTER — RESULTS ENCOUNTER (OUTPATIENT)
Dept: FAMILY MEDICINE CLINIC | Facility: CLINIC | Age: 65
End: 2020-11-26

## 2020-11-26 DIAGNOSIS — R73.01 IMPAIRED FASTING GLUCOSE: ICD-10-CM

## 2020-11-26 DIAGNOSIS — E78.00 PURE HYPERCHOLESTEROLEMIA: ICD-10-CM

## 2020-11-26 DIAGNOSIS — Z12.5 SCREENING PSA (PROSTATE SPECIFIC ANTIGEN): ICD-10-CM

## 2020-11-26 DIAGNOSIS — Z00.00 HEALTH CARE MAINTENANCE: ICD-10-CM

## 2020-12-01 ENCOUNTER — TELEPHONE (OUTPATIENT)
Dept: GASTROENTEROLOGY | Facility: CLINIC | Age: 65
End: 2020-12-01

## 2020-12-04 ENCOUNTER — PREP FOR SURGERY (OUTPATIENT)
Dept: OTHER | Facility: HOSPITAL | Age: 65
End: 2020-12-04

## 2020-12-04 DIAGNOSIS — K63.5 COLON POLYP: Primary | ICD-10-CM

## 2020-12-04 DIAGNOSIS — Z80.0 FAMILY HISTORY OF COLON CANCER: ICD-10-CM

## 2020-12-08 PROBLEM — Z80.0 FAMILY HISTORY OF COLON CANCER: Status: ACTIVE | Noted: 2020-12-08

## 2020-12-08 PROBLEM — K63.5 COLON POLYP: Status: ACTIVE | Noted: 2020-12-08

## 2021-01-25 RX ORDER — ATORVASTATIN CALCIUM 40 MG/1
TABLET, FILM COATED ORAL
Qty: 90 TABLET | Refills: 0 | Status: SHIPPED | OUTPATIENT
Start: 2021-01-25 | End: 2021-04-21

## 2021-01-25 NOTE — TELEPHONE ENCOUNTER
Pt is calling back to schedule. He said he isn't due in late February or early March. He would like to schedule after he receives his vaccine. He would like this medication called in to last until then if possible.

## 2021-03-03 DIAGNOSIS — Z23 NEED FOR VACCINATION: ICD-10-CM

## 2021-03-19 ENCOUNTER — BULK ORDERING (OUTPATIENT)
Dept: CASE MANAGEMENT | Facility: OTHER | Age: 66
End: 2021-03-19

## 2021-03-19 DIAGNOSIS — Z23 IMMUNIZATION DUE: ICD-10-CM

## 2021-03-26 LAB
ALBUMIN SERPL-MCNC: 4.5 G/DL (ref 3.5–5.2)
ALBUMIN/GLOB SERPL: 2.1 G/DL
ALP SERPL-CCNC: 85 U/L (ref 39–117)
ALT SERPL-CCNC: 37 U/L (ref 1–41)
AST SERPL-CCNC: 27 U/L (ref 1–40)
BILIRUB SERPL-MCNC: 0.8 MG/DL (ref 0–1.2)
BUN SERPL-MCNC: 17 MG/DL (ref 8–23)
BUN/CREAT SERPL: 16.7 (ref 7–25)
CALCIUM SERPL-MCNC: 9.5 MG/DL (ref 8.6–10.5)
CHLORIDE SERPL-SCNC: 101 MMOL/L (ref 98–107)
CHOLEST SERPL-MCNC: 183 MG/DL (ref 0–200)
CO2 SERPL-SCNC: 29.5 MMOL/L (ref 22–29)
CREAT SERPL-MCNC: 1.02 MG/DL (ref 0.76–1.27)
GLOBULIN SER CALC-MCNC: 2.1 GM/DL
GLUCOSE SERPL-MCNC: 105 MG/DL (ref 65–99)
HBA1C MFR BLD: 6 % (ref 4.8–5.6)
HDLC SERPL-MCNC: 46 MG/DL (ref 40–60)
LDLC SERPL CALC-MCNC: 115 MG/DL (ref 0–100)
POTASSIUM SERPL-SCNC: 5 MMOL/L (ref 3.5–5.2)
PROT SERPL-MCNC: 6.6 G/DL (ref 6–8.5)
PSA SERPL-MCNC: 0.52 NG/ML (ref 0–4)
SODIUM SERPL-SCNC: 142 MMOL/L (ref 136–145)
TRIGL SERPL-MCNC: 120 MG/DL (ref 0–150)
VLDLC SERPL CALC-MCNC: 22 MG/DL (ref 5–40)

## 2021-04-05 ENCOUNTER — OFFICE VISIT (OUTPATIENT)
Dept: FAMILY MEDICINE CLINIC | Facility: CLINIC | Age: 66
End: 2021-04-05

## 2021-04-05 VITALS
DIASTOLIC BLOOD PRESSURE: 98 MMHG | SYSTOLIC BLOOD PRESSURE: 157 MMHG | BODY MASS INDEX: 32.16 KG/M2 | HEART RATE: 78 BPM | WEIGHT: 229.7 LBS | OXYGEN SATURATION: 96 % | HEIGHT: 71 IN | TEMPERATURE: 97.1 F

## 2021-04-05 DIAGNOSIS — E78.00 PURE HYPERCHOLESTEROLEMIA: Primary | ICD-10-CM

## 2021-04-05 DIAGNOSIS — R73.01 IMPAIRED FASTING GLUCOSE: ICD-10-CM

## 2021-04-05 DIAGNOSIS — R03.0 ELEVATED BLOOD PRESSURE READING: ICD-10-CM

## 2021-04-05 PROCEDURE — 99214 OFFICE O/P EST MOD 30 MIN: CPT | Performed by: FAMILY MEDICINE

## 2021-04-05 NOTE — PROGRESS NOTES
"Answers for HPI/ROS submitted by the patient on 3/24/2021  What is the primary reason for your visit?: Other  Please describe your symptoms.: None, just to review bloodwork results  Have you had these symptoms before?: No  How long have you been having these symptoms?: 1-4 days  Please list any medications you are currently taking for this condition.: Na  Please describe any probable cause for these symptoms. : Na    Chief Complaint  Hyperlipidemia (follow up )    Subjective          Nathan Burk presents to [facility]  History of Present Illness     Hyperlipidemia.  He continues atorvastatin 40 mg a day.  Overall his cholesterol panel is stable.  The HDL is up a bit.      Elevated blood pressure reading today.  In the past his blood pressures been normal.  He is had some left shoulder pain.  He said to steroid injections in his shoulder over the last few months.  He has been taking a lot of ibuprofen.  He believes his orthopedic surgeons about [facility] to call in meloxicam.  He has no cardiovascular symptoms such as chest pain.    Impaired fasting glucose.  The A1c is improved.  Fasting glucose is 105.  Which is pretty good considering the steroid injections in the last few months.    Objective   Vital Signs:   /98   Pulse 78   Temp 97.1 °F (36.2 °C) (Temporal)   Ht 180.3 cm (71\")   Wt 104 kg (229 lb 11.2 oz)   SpO2 96%   BMI 32.04 kg/m²     Physical Exam  Vitals and nursing note reviewed.   Constitutional:       General: He is not in acute distress.     Appearance: He is well-developed.   Neck:      Thyroid: No thyromegaly.   Cardiovascular:      Rate and Rhythm: Normal rate and regular rhythm.      Pulses: Normal pulses.   Pulmonary:      Effort: Pulmonary effort is normal.   Skin:     General: Skin is warm and dry.   Psychiatric:         Behavior: Behavior normal.         Thought Content: Thought content normal.         Judgment: Judgment normal.        Result Review : "   The following data was reviewed by: Bud Rush MD on 04/05/2021:  Common labs    Common Labsle 6/8/20 6/8/20 6/8/20 6/8/20 3/25/21 3/25/21 3/25/21 3/25/21    0744 0744 0744 0744 0809 0809 0809 0809   Glucose 111 (A)          Glucose      105 (A)     BUN 17     17     Creatinine 0.90     1.02     eGFR Non African Am 85     73     eGFR African Am      89     Sodium 140     142     Potassium 4.0     5.0     Chloride 106     101     Calcium 9.0     9.5     Total Protein      6.6     Albumin 4.20     4.50     Total Bilirubin 0.6     0.8     Alkaline Phosphatase 76     85     AST (SGOT) 22     27     ALT (SGPT) 25     37     Total Cholesterol    139       Total Cholesterol       183    Triglycerides    128   120    HDL Cholesterol    36 (A)   46    LDL Cholesterol     77   115 (A)    Hemoglobin A1C   6.37 (A)  6.00 (A)      PSA  0.768      0.521   (A) Abnormal value       Comments are available for some flowsheets but are not being displayed.                     Assessment and Plan    Diagnoses and all orders for this visit:    1. Pure hypercholesterolemia (Primary)  -     Comprehensive Metabolic Panel; Future  -     Lipid Panel; Future    2. Impaired fasting glucose  -     Hemoglobin A1c; Future  -     Comprehensive Metabolic Panel; Future  -     Lipid Panel; Future    3. Elevated blood pressure reading  -     Comprehensive Metabolic Panel; Future  -     Lipid Panel; Future      Hyperlipidemia.  Overall well controlled.  Continue atorvastatin as is.    Impaired fasting glucose.  A1c is improving.  Continue diet and exercise.    Elevated blood pressure readings.  Suspicious for hypertension diagnosis.  He is going to be checking his blood pressure on a regular basis over the next 3 or 4 weeks and then send me readings.  To consider the addition of amlodipine.  The blood pressure may be exacerbated by the NSAIDs he is taking for shoulder.  He will follow up with his orthopedic surgeon.    Follow-up in 6 months for  annual Medicare visit and recheck      Follow Up   No follow-ups on file.  Patient was given instructions and counseling regarding his condition or for health maintenance advice. Please see specific information pulled into the AVS if appropriate.

## 2021-04-13 ENCOUNTER — TRANSCRIBE ORDERS (OUTPATIENT)
Dept: SLEEP MEDICINE | Facility: HOSPITAL | Age: 66
End: 2021-04-13

## 2021-04-13 DIAGNOSIS — Z01.818 OTHER SPECIFIED PRE-OPERATIVE EXAMINATION: Primary | ICD-10-CM

## 2021-04-21 RX ORDER — ATORVASTATIN CALCIUM 40 MG/1
TABLET, FILM COATED ORAL
Qty: 90 TABLET | Refills: 1 | Status: SHIPPED | OUTPATIENT
Start: 2021-04-21 | End: 2021-11-01

## 2021-04-26 ENCOUNTER — TELEPHONE (OUTPATIENT)
Dept: GASTROENTEROLOGY | Facility: CLINIC | Age: 66
End: 2021-04-26

## 2021-04-26 NOTE — TELEPHONE ENCOUNTER
----- Message from Nathan Burk sent at 4/26/2021 12:04 PM EDT -----  Regarding: RE:prep instructions  Contact: [phone]  Can you confirm what time I am due at the office? I know the appointment was for 12:30, please confirm if that is when I should be there or that is when the procedure is and I should be there 15-30  minutes early

## 2021-04-27 ENCOUNTER — LAB (OUTPATIENT)
Dept: LAB | Facility: HOSPITAL | Age: 66
End: 2021-04-27

## 2021-04-27 DIAGNOSIS — Z01.818 OTHER SPECIFIED PRE-OPERATIVE EXAMINATION: ICD-10-CM

## 2021-04-27 PROCEDURE — U0004 COV-19 TEST NON-CDC HGH THRU: HCPCS

## 2021-04-27 PROCEDURE — U0005 INFEC AGEN DETEC AMPLI PROBE: HCPCS

## 2021-04-27 PROCEDURE — C9803 HOPD COVID-19 SPEC COLLECT: HCPCS

## 2021-04-28 LAB — SARS-COV-2 RNA RESP QL NAA+PROBE: NOT DETECTED

## 2021-05-10 ENCOUNTER — TELEPHONE (OUTPATIENT)
Dept: FAMILY MEDICINE CLINIC | Facility: CLINIC | Age: 66
End: 2021-05-10

## 2021-05-10 NOTE — TELEPHONE ENCOUNTER
I recommend an office visit.  I looked through my notes, I do not see that we spoke about this before.  Needs further evaluation.
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
## 2021-05-10 NOTE — TELEPHONE ENCOUNTER
Caller: Nathan Burk    Relationship: Self    Best call back number: [phone]      What medication are you requesting: MEDICATION FOR PAIN     What are your current symptoms: PAIN IN LEFT SIDE OF NECK AND RADIATES TO LEFT SHOULDER .      How long have you been experiencing symptoms: SHOULDER WAS TWO MONTHS AGO .     Have you had these symptoms before:    [] Yes  [x] No    Have you been treated for these symptoms before:   [] Yes  [x] No    If a prescription is needed, what is your preferred pharmacy and phone number: [id number] - [place], [place] - [address] AT [facility] [phone] [facility] [phone]      Additional notes: N/A

## 2021-05-11 ENCOUNTER — OFFICE VISIT (OUTPATIENT)
Dept: FAMILY MEDICINE CLINIC | Facility: CLINIC | Age: 66
End: 2021-05-11

## 2021-05-11 ENCOUNTER — TELEPHONE (OUTPATIENT)
Dept: FAMILY MEDICINE CLINIC | Facility: CLINIC | Age: 66
End: 2021-05-11

## 2021-05-11 VITALS
HEART RATE: 72 BPM | DIASTOLIC BLOOD PRESSURE: 90 MMHG | TEMPERATURE: 97.1 F | WEIGHT: 223.6 LBS | HEIGHT: 71 IN | OXYGEN SATURATION: 96 % | BODY MASS INDEX: 31.3 KG/M2 | SYSTOLIC BLOOD PRESSURE: 146 MMHG

## 2021-05-11 DIAGNOSIS — M54.2 NECK PAIN: Primary | ICD-10-CM

## 2021-05-11 DIAGNOSIS — M54.12 CERVICAL RADICULOPATHY: Primary | ICD-10-CM

## 2021-05-11 PROCEDURE — 99214 OFFICE O/P EST MOD 30 MIN: CPT | Performed by: FAMILY MEDICINE

## 2021-05-11 RX ORDER — IBUPROFEN 800 MG/1
800 TABLET ORAL EVERY 6 HOURS PRN
Qty: 50 TABLET | Refills: 0 | Status: SHIPPED | OUTPATIENT
Start: 2021-05-11 | End: 2021-06-25 | Stop reason: SDUPTHER

## 2021-05-11 RX ORDER — ALPRAZOLAM 0.5 MG/1
0.5 TABLET ORAL 2 TIMES DAILY PRN
Qty: 2 TABLET | Refills: 0 | Status: SHIPPED | OUTPATIENT
Start: 2021-05-11 | End: 2021-07-08

## 2021-05-11 RX ORDER — CYCLOBENZAPRINE HCL 10 MG
10 TABLET ORAL
Qty: 30 TABLET | Refills: 0 | Status: SHIPPED | OUTPATIENT
Start: 2021-05-11 | End: 2021-07-08

## 2021-05-11 RX ORDER — MELOXICAM 15 MG/1
15 TABLET ORAL DAILY
COMMUNITY
Start: 2021-04-05 | End: 2021-05-11

## 2021-05-11 RX ORDER — METHYLPREDNISOLONE 4 MG/1
TABLET ORAL
COMMUNITY
Start: 2021-05-06 | End: 2021-06-03
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
## 2021-05-14 ENCOUNTER — TELEPHONE (OUTPATIENT)
Dept: GASTROENTEROLOGY | Facility: CLINIC | Age: 66
End: 2021-05-14

## 2021-05-20 DIAGNOSIS — M54.12 CERVICAL RADICULOPATHY: ICD-10-CM

## 2021-06-03 ENCOUNTER — OFFICE VISIT (OUTPATIENT)
Dept: FAMILY MEDICINE CLINIC | Facility: CLINIC | Age: 66
End: 2021-06-03

## 2021-06-03 VITALS
HEIGHT: 71 IN | BODY MASS INDEX: 30.94 KG/M2 | DIASTOLIC BLOOD PRESSURE: 92 MMHG | SYSTOLIC BLOOD PRESSURE: 146 MMHG | OXYGEN SATURATION: 95 % | TEMPERATURE: 97.1 F | WEIGHT: 221 LBS | HEART RATE: 79 BPM

## 2021-06-03 DIAGNOSIS — M54.12 CERVICAL RADICULOPATHY: Primary | ICD-10-CM

## 2021-06-03 PROCEDURE — 99213 OFFICE O/P EST LOW 20 MIN: CPT | Performed by: FAMILY MEDICINE

## 2021-06-03 RX ORDER — PREDNISONE 20 MG/1
40 TABLET ORAL DAILY
Qty: 10 TABLET | Refills: 0 | Status: SHIPPED | OUTPATIENT
Start: 2021-06-03 | End: 2021-06-25 | Stop reason: SDUPTHER

## 2021-06-03 RX ORDER — FLUTICASONE PROPIONATE 220 UG/1
2 AEROSOL, METERED RESPIRATORY (INHALATION) EVERY MORNING
COMMUNITY
Start: 2021-06-02

## 2021-06-03 NOTE — PROGRESS NOTES
"Answers for HPI/ROS submitted by the patient on 5/27/2021  What is the primary reason for your visit?: Other  Please describe your symptoms.: Neck pain  Have you had these symptoms before?: Yes  How long have you been having these symptoms?: Greater than 2 weeks  Please list any medications you are currently taking for this condition.: Ibuprofen  Please describe any probable cause for these symptoms. : Degenerative disk    Chief Complaint  cervical radiculopathy (follow up )    Subjective          Nathan Burk presents to [facility]  History of Present Illness     Follow-up MRI.  It showed C5-C6 degenerative disc disease with left neuroforaminal encroachment.  The likely cause of his left-sided neck and shoulder and arm radiculopathy.  However the symptoms are much better.  He is taking ibuprofen occasionally which helps.  He on rare occasions will take prednisone which will help.  He is continuing his physical therapy which seems to be helping.  He wants to go ahead and continue to take it.  Otherwise the pain is manageable with occasional ibuprofen and a heating pad.  There is no weakness in the left arm.  He does get occasional tingling into the left fourth and fifth fingers.  He has been monitoring his blood pressure at home.  Is averaging 134 over 70s.    Objective   Vital Signs:   /92   Pulse 79   Temp 97.1 °F (36.2 °C) (Temporal)   Ht 180.3 cm (70.98\")   Wt 100 kg (221 lb)   SpO2 95%   BMI 30.84 kg/m²     Physical Exam  Constitutional:       Appearance: Normal appearance.   Neck:      Comments: Neck with full range of motion no pain to palpation.  No trapezius pain to palpation.  Strength is 5 out of 5 in all major muscle groups in the upper extremities.  Good pulses.  No rash.  Neurological:      Mental Status: He is alert.        Result Review :       Data reviewed: Radiologic studies MRI report          Assessment and Plan    Diagnoses and all orders for this " visit:    1. Cervical radiculopathy (Primary)    Other orders  -     predniSONE (DELTASONE) 20 MG tablet; Take 2 tablets by mouth Daily.  Dispense: 10 tablet; Refill: 0      Follow-up cervical radiculopathy with improvement in symptoms with physical therapy and NSAIDs.  He will continue the physical therapy.  I did give him prescription for prednisone 40 mg a day for 5 days with severe flareups of the radiculopathy otherwise continue current plan.  I will see him back in October as scheduled.  Follow-up sooner as needed.    Elevated blood pressure today.  It is running on average acceptable at home.  We will continue to monitor.  Recent ibuprofen probably raised it to a degree.      Follow Up   No follow-ups on file.  Patient was given instructions and counseling regarding his condition or for health maintenance advice. Please see specific information pulled into the AVS if appropriate.

## 2021-06-16 DIAGNOSIS — M54.2 NECK PAIN: Primary | ICD-10-CM

## 2021-06-23 ENCOUNTER — TRANSCRIBE ORDERS (OUTPATIENT)
Dept: ADMINISTRATIVE | Facility: HOSPITAL | Age: 66
End: 2021-06-23

## 2021-06-23 DIAGNOSIS — M54.2 NECK PAIN: Primary | ICD-10-CM

## 2021-06-28 RX ORDER — PREDNISONE 20 MG/1
40 TABLET ORAL DAILY
Qty: 10 TABLET | Refills: 0 | Status: SHIPPED | OUTPATIENT
Start: 2021-06-28 | End: 2021-07-09

## 2021-06-28 RX ORDER — IBUPROFEN 800 MG/1
800 TABLET ORAL EVERY 6 HOURS PRN
Qty: 50 TABLET | Refills: 0 | Status: SHIPPED | OUTPATIENT
Start: 2021-06-28 | End: 2022-04-15 | Stop reason: HOSPADM

## 2021-07-01 ENCOUNTER — HOSPITAL ENCOUNTER (OUTPATIENT)
Dept: GENERAL RADIOLOGY | Facility: HOSPITAL | Age: 66
Discharge: HOME | End: 2021-07-01

## 2021-07-01 ENCOUNTER — ANESTHESIA EVENT (OUTPATIENT)
Dept: PAIN MEDICINE | Facility: HOSPITAL | Age: 66
End: 2021-07-01

## 2021-07-01 ENCOUNTER — HOSPITAL ENCOUNTER (OUTPATIENT)
Dept: PAIN MEDICINE | Facility: HOSPITAL | Age: 66
Discharge: HOME | End: 2021-07-01

## 2021-07-01 ENCOUNTER — ANESTHESIA (OUTPATIENT)
Dept: PAIN MEDICINE | Facility: HOSPITAL | Age: 66
End: 2021-07-01

## 2021-07-01 VITALS
BODY MASS INDEX: 30.66 KG/M2 | HEART RATE: 83 BPM | HEIGHT: 71 IN | DIASTOLIC BLOOD PRESSURE: 89 MMHG | WEIGHT: 219 LBS | OXYGEN SATURATION: 97 % | RESPIRATION RATE: 16 BRPM | SYSTOLIC BLOOD PRESSURE: 146 MMHG | TEMPERATURE: 97.1 F

## 2021-07-01 DIAGNOSIS — M54.2 CERVICAL PAIN: ICD-10-CM

## 2021-07-01 DIAGNOSIS — M54.12 CERVICAL NEURITIS: Primary | ICD-10-CM

## 2021-07-01 PROCEDURE — 0 IOPAMIDOL 41 % SOLUTION: Performed by: ANESTHESIOLOGY

## 2021-07-01 PROCEDURE — 25010000002 DEXAMETHASONE SODIUM PHOSPHATE 10 MG/ML SOLUTION: Performed by: ANESTHESIOLOGY

## 2021-07-01 PROCEDURE — 77003 FLUOROGUIDE FOR SPINE INJECT: CPT

## 2021-07-01 RX ORDER — CHOLECALCIFEROL (VITAMIN D3) 25 MCG
1 TABLET,CHEWABLE ORAL DAILY
COMMUNITY

## 2021-07-01 RX ORDER — MIDAZOLAM HYDROCHLORIDE 1 MG/ML
1 INJECTION INTRAMUSCULAR; INTRAVENOUS AS NEEDED
Status: DISCONTINUED | OUTPATIENT
Start: 2021-07-01 | End: 2021-07-02 | Stop reason: HOSPADM

## 2021-07-01 RX ORDER — FENTANYL CITRATE 50 UG/ML
50 INJECTION, SOLUTION INTRAMUSCULAR; INTRAVENOUS AS NEEDED
Status: DISCONTINUED | OUTPATIENT
Start: 2021-07-01 | End: 2021-07-02 | Stop reason: HOSPADM

## 2021-07-01 RX ORDER — OMEGA-3S/DHA/EPA/FISH OIL/D3 300MG-1000
400 CAPSULE ORAL DAILY
COMMUNITY

## 2021-07-01 RX ORDER — MULTIVITAMIN WITH IRON
1 TABLET ORAL DAILY
COMMUNITY

## 2021-07-01 RX ORDER — LIDOCAINE HYDROCHLORIDE 10 MG/ML
1 INJECTION, SOLUTION INFILTRATION; PERINEURAL ONCE AS NEEDED
Status: DISCONTINUED | OUTPATIENT
Start: 2021-07-01 | End: 2021-07-02 | Stop reason: HOSPADM

## 2021-07-01 RX ORDER — MULTIVIT WITH MINERALS/LUTEIN
1000 TABLET ORAL DAILY
COMMUNITY

## 2021-07-01 RX ORDER — SODIUM CHLORIDE 0.9 % (FLUSH) 0.9 %
1-10 SYRINGE (ML) INJECTION AS NEEDED
Status: DISCONTINUED | OUTPATIENT
Start: 2021-07-01 | End: 2021-07-02 | Stop reason: HOSPADM

## 2021-07-01 RX ORDER — DEXAMETHASONE SODIUM PHOSPHATE 10 MG/ML
10 INJECTION, SOLUTION INTRAMUSCULAR; INTRAVENOUS ONCE
Status: COMPLETED | OUTPATIENT
Start: 2021-07-01 | End: 2021-07-01

## 2021-07-01 RX ADMIN — DEXAMETHASONE SODIUM PHOSPHATE 10 MG: 10 INJECTION, SOLUTION INTRAMUSCULAR; INTRAVENOUS at 10:02

## 2021-07-01 RX ADMIN — IOPAMIDOL 10 ML: 408 INJECTION, SOLUTION INTRATHECAL at 10:01

## 2021-07-01 NOTE — ANESTHESIA PROCEDURE NOTES
PAIN Epidural block    Pre-sedation assessment completed: 7/1/2021 9:55 AM    Patient reassessed immediately prior to procedure    Start Time: 7/1/2021 9:55 AM  Stop Time: 7/1/2021 10:03 AM  Indication:at surgeon's request and procedure for pain  Performed By  Anesthesiologist: Guillermo Lakhani MD  Preanesthetic Checklist  Completed: patient identified, risks and benefits discussed, surgical consent, monitors and equipment checked, pre-op evaluation and timeout performed  Additional Notes  Post-Op Diagnosis Codes:     * Cervical neuritis (M54.12)     * Cervical stenosis of spine (M48.02)    Prep:  Pt Position:prone  Sterile Tech:sterile barrier, mask and gloves  Prep:chlorhexidine gluconate and isopropyl alcohol  Monitoring:blood pressure monitoring, continuous pulse oximetry and EKG  Procedure:Sedation: no     Approach:midline  Guidance: fluoroscopy  Location:cervical  Level:5-6  Needle Gauge:20 G  Aspiration:negative  Test Dose:negative  Medications:  Analgesia: Dexamethasone 10 mg.  Isovue:2mL  Comments:Using AP fluoroscopy at what I believed was the C5-6 level an epidural needle was placed in the epidural space utilizing loss-of-resistance technique. There is no pain with injection. No return of red blood or cerebrospinal fluid. Dexamethasone was injected and the needle was withdrawn  Post Assessment:  Pt Tolerance:patient tolerated the procedure well with no apparent complications  Complications:no

## 2021-07-01 NOTE — DISCHARGE INSTRUCTIONS
Cervical epidural steroid injection instructions  Plan includes:  1.  Cervical epidural steroid injections, up to 3, spaced 4 weeks apart.  If pain control is acceptable after 1 or 2 injections, it was discussed with the patient that they may return for the subsequent injections if and when their pain returns.  The risks were discussed with the patient including failure of relief, worsening pain, Headache (post dural puncture headache), bleeding (epidural hematoma) and infection (epidural abscess or skin infection).  2.  Physical therapy exercises at home as prescribed by physical therapy or from the pain clinic handout (given to the patient).  Continuation of these exercises every day, or multiple times per week, even when the patient has good pain relief, was stressed to the patient as a preventative measure to decrease the frequency and severity of future pain episodes.  3.  Continue pain medicines as already prescribed.  If patient not currently taking any, it is recommended to begin Acetaminophen 1000 mg po q 8 hours.  If other medicines containing Acetaminophen are currently prescribed, maintain daily dose at 3000 mg.    4.  If they can tolerate NSAIDS, it is recommended to take Ibuprofen 600 mg po q 6 hours for 7 days during pain exacerbations.  Alternatively, they may substitute an NSAID of their choice (e.g. Aleve).  This may be taken at the same time as Acetaminophen.  5.  Heat and ice to the affected area as tolerated for pain control.  It was discussed that heating pads can cause burns.  6.  Low impact exercise such as walking or water exercise was recommended to maintain overall health and aid in weight control.   7.  Follow up as needed for subsequent injections.  8.  Patient was counseled to abstain from tobacco products.    What can I expect after the procedure?  Follow these instructions at home:  Injection site care  1. You may remove the bandage (dressing) after 24 hours.  2. Check your injection  site every day for signs of infection. Check for:  ? Redness, swelling, or pain.  ? Fluid or blood.  ? Warmth.  ? Pus or a bad smell.  Managing pain, stiffness, and swelling  1. For 24 hours after the procedure:  ? Avoid using heat on the injection site.  ? Do not take baths, swim, or use a hot tub. You may take a shower.   2. If directed, put ice on the injection site. To do this:     3.    ? Put ice in a plastic bag.  ? Place a towel between your skin and the bag.  ? Leave the ice on for 20 minutes, 2-3 times a day.     Activity  · NO DRIVING FOR THE REST OF THE DAY IF receiving a sedative during your procedure.  · Return to your normal activities the next day as tolerated.  General instructions  · The injection site may feel sore.  · Take over-the-counter medications as directed on packaging and prescription medicines only as told by your health care provider who prescribes these.  · Keep all follow-up visits as told by your health care provider.   Contact a health care provider if:  1. You have any of these signs of infection:  ? Redness, swelling, or pain around your injection site.  ? Fluid or blood coming from your injection site.  ? Warmth coming from your injection site.  ? Pus or a bad smell coming from your injection site.  ? A fever.  2. You continue to have pain and soreness around the injection site, even after taking over-the-counter pain medicine.  3. You have severe, sudden, or lasting nausea or vomiting.  Get help right away if:  · You have severe pain at the injection site that is not relieved by medicines.  · You develop a severe headache or a stiff neck.  · You become sensitive to light.  · You have any new numbness or weakness in your legs or arms.  · You lose control of your bladder or bowel movements.  · You have trouble breathing.  Summary  · An epidural steroid block is an injection of steroid medication and numbing medicine that is given into the epidural space.  · This injection helps  "relieve pain caused by an irritated or swollen nerve root.    Guide To Relieving And Avoiding Neck Pain    Exercise is important to help prevent and treat neck pain.  Good posture, exercise and avoiding injury will help to keep your neck healthy.    When the neck is strained or over worked symptoms may include headache, upper back pain, shoulder pain or arm pain.  Numbness or tingling in the fingers, dizziness or nausea may also occur.    Posture:    Avoid slumping over a desk.  Raise your work (including computer) to eye level to avoid bending at the neck.      Change Position Often:  Changing position prevents overuse of particular muscles.    Sleep On One Pillow:  Using to many pillows or to large of a pillow causes a \"kink\" in you neck.      Move and Exercise:  Living an active lifestyle is an important part of staying healthy.  Be sure to include the exercise to follow specifically for your neck.                    Range of Motion Exercises:  Do these exercises three times a day.  If you experience increased pain stop and contact your physician.  All exercises can be performed sitting or standing.        1.    2.   3.    1.  Place both hands behind you neck.  Gently tilt your neck backward so that you are looking at the ceiling.  Hold for a count of 10.    2.  Look straight facing forward.  Slowly tip your ear toward your right shoulder.  Do not force the motion.  Hold for a count of 10.  Bring head back to starting position and repeat to left side.    3.  Look straight facing forward.  Gently turn your head to the right.  Do not force the motion.  Hold for a count of 10.  Bring head back to starting position and repeat to the left side.    Exercises To Strengthen Muscles:  1.  Look straight facing forward.  Relax your shoulders.  Raise both shoulders toward your ears.  Hold for 3 seconds.       1.       2.   3.      1.  Look straight facing forward.  Relax your shoulders.  Raise both shoulders toward     2.  " Raise your ars to your side and bend your elbows.  Squeeze shoulder blades together as you rotate your arms outward.  Hold for 5 seconds.    3.  Look straight facing forward.  Pull your head straight back.  Do not tip or move your jaw.  Hold for 5 seconds.
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
## 2021-07-08 ENCOUNTER — OFFICE VISIT (OUTPATIENT)
Dept: NEUROSURGERY | Facility: CLINIC | Age: 66
End: 2021-07-08

## 2021-07-08 VITALS
HEIGHT: 71 IN | SYSTOLIC BLOOD PRESSURE: 130 MMHG | WEIGHT: 219 LBS | TEMPERATURE: 99.1 F | BODY MASS INDEX: 30.66 KG/M2 | DIASTOLIC BLOOD PRESSURE: 88 MMHG

## 2021-07-08 DIAGNOSIS — M50.10 HERNIATION OF CERVICAL INTERVERTEBRAL DISC WITH RADICULOPATHY: Primary | ICD-10-CM

## 2021-07-08 PROCEDURE — 99204 OFFICE O/P NEW MOD 45 MIN: CPT | Performed by: NEUROLOGICAL SURGERY

## 2021-07-08 RX ORDER — CEFAZOLIN SODIUM IN 0.9 % NACL 3 G/100 ML
3 INTRAVENOUS SOLUTION, PIGGYBACK (ML) INTRAVENOUS ONCE
Status: CANCELLED | OUTPATIENT
Start: 2021-07-12 | End: 2021-07-08
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
## 2021-07-09 ENCOUNTER — PRE-ADMISSION TESTING (OUTPATIENT)
Dept: PREADMISSION TESTING | Facility: HOSPITAL | Age: 66
End: 2021-07-09

## 2021-07-09 VITALS
WEIGHT: 224 LBS | SYSTOLIC BLOOD PRESSURE: 144 MMHG | OXYGEN SATURATION: 98 % | DIASTOLIC BLOOD PRESSURE: 88 MMHG | TEMPERATURE: 97.8 F | BODY MASS INDEX: 31.36 KG/M2 | HEIGHT: 71 IN | RESPIRATION RATE: 16 BRPM | HEART RATE: 88 BPM

## 2021-07-09 LAB
ANION GAP SERPL CALCULATED.3IONS-SCNC: 8.5 MMOL/L (ref 5–15)
BUN SERPL-MCNC: 17 MG/DL (ref 8–23)
BUN/CREAT SERPL: 21.3 (ref 7–25)
CALCIUM SPEC-SCNC: 9.5 MG/DL (ref 8.6–10.5)
CHLORIDE SERPL-SCNC: 105 MMOL/L (ref 98–107)
CO2 SERPL-SCNC: 26.5 MMOL/L (ref 22–29)
CREAT SERPL-MCNC: 0.8 MG/DL (ref 0.76–1.27)
DEPRECATED RDW RBC AUTO: 42.9 FL (ref 37–54)
ERYTHROCYTE [DISTWIDTH] IN BLOOD BY AUTOMATED COUNT: 12.7 % (ref 12.3–15.4)
GFR SERPL CREATININE-BSD FRML MDRD: 97 ML/MIN/1.73
GLUCOSE SERPL-MCNC: 102 MG/DL (ref 65–99)
HCT VFR BLD AUTO: 44.6 % (ref 37.5–51)
HGB BLD-MCNC: 15 G/DL (ref 13–17.7)
MCH RBC QN AUTO: 30.8 PG (ref 26.6–33)
MCHC RBC AUTO-ENTMCNC: 33.6 G/DL (ref 31.5–35.7)
MCV RBC AUTO: 91.6 FL (ref 79–97)
PLATELET # BLD AUTO: 233 10*3/MM3 (ref 140–450)
PMV BLD AUTO: 9.8 FL (ref 6–12)
POTASSIUM SERPL-SCNC: 4.3 MMOL/L (ref 3.5–5.2)
QT INTERVAL: 375 MS
RBC # BLD AUTO: 4.87 10*6/MM3 (ref 4.14–5.8)
SARS-COV-2 ORF1AB RESP QL NAA+PROBE: NOT DETECTED
SODIUM SERPL-SCNC: 140 MMOL/L (ref 136–145)
WBC # BLD AUTO: 9.26 10*3/MM3 (ref 3.4–10.8)

## 2021-07-09 PROCEDURE — U0005 INFEC AGEN DETEC AMPLI PROBE: HCPCS

## 2021-07-09 PROCEDURE — 85027 COMPLETE CBC AUTOMATED: CPT

## 2021-07-09 PROCEDURE — C9803 HOPD COVID-19 SPEC COLLECT: HCPCS

## 2021-07-09 PROCEDURE — 93005 ELECTROCARDIOGRAM TRACING: CPT

## 2021-07-09 PROCEDURE — 36415 COLL VENOUS BLD VENIPUNCTURE: CPT

## 2021-07-09 PROCEDURE — U0004 COV-19 TEST NON-CDC HGH THRU: HCPCS

## 2021-07-09 PROCEDURE — 80048 BASIC METABOLIC PNL TOTAL CA: CPT

## 2021-07-09 PROCEDURE — 93010 ELECTROCARDIOGRAM REPORT: CPT | Performed by: INTERNAL MEDICINE

## 2021-07-09 RX ORDER — LORATADINE 10 MG/1
10 TABLET ORAL DAILY
COMMUNITY
End: 2021-09-13

## 2021-07-09 NOTE — DISCHARGE INSTRUCTIONS
Take the following medications the morning of surgery:      PREVACID,  ANORA,  FLOVENT, ASTELIN AND SINGULAR    ARRIVE AT 12:00    If you are on prescription narcotic pain medication to control your pain you may also take that medication the morning of surgery.    General Instructions:  • Do not eat solid food after midnight the night before surgery.  • You may drink clear liquids day of surgery but must stop at least one hour before your hospital arrival time.  • It is beneficial for you to have a clear drink that contains carbohydrates the day of surgery.  We suggest a 12 to 20 ounce bottle of Gatorade or Powerade for non-diabetic patients or a 12 to 20 ounce bottle of G2 or Powerade Zero for diabetic patients. (Pediatric patients, are not advised to drink a 12 to 20 ounce carbohydrate drink)    Clear liquids are liquids you can see through.  Nothing red in color.     Plain water                               Sports drinks  Sodas                                   Gelatin (Jell-O)  Fruit juices without pulp such as white grape juice and apple juice  Popsicles that contain no fruit or yogurt  Tea or coffee (no cream or milk added)  Gatorade / Powerade  G2 / Powerade Zero    •   • Patients who avoid smoking, chewing tobacco and alcohol for 4 weeks prior to surgery have a reduced risk of post-operative complications.  Quit smoking as many days before surgery as you can.  • Do not smoke, use chewing tobacco or drink alcohol the day of surgery.   • If applicable bring your C-PAP/ BI-PAP machine.  • Bring any papers given to you in the doctor’s office.  • Wear clean comfortable clothes.  • Do not wear contact lenses, false eyelashes or make-up.  Bring a case for your glasses.   • Bring crutches or walker if applicable.  • Remove all piercings.  Leave jewelry and any other valuables at home.  • Hair extensions with metal clips must be removed prior to surgery.  • The Pre-Admission Testing nurse will instruct you to bring  medications if unable to obtain an accurate list in Pre-Admission Testing.            Preventing a Surgical Site Infection:  • For 2 to 3 days before surgery, avoid shaving with a razor because the razor can irritate skin and make it easier to develop an infection.    • Any areas of open skin can increase the risk of a post-operative wound infection by allowing bacteria to enter and travel throughout the body.  Notify your surgeon if you have any skin wounds / rashes even if it is not near the expected surgical site.  The area will need assessed to determine if surgery should be delayed until it is healed.  • The night prior to surgery shower using a fresh bar of anti-bacterial soap (such as Dial) and clean washcloth.  Sleep in a clean bed with clean clothing.  Do not allow pets to sleep with you.  • Shower on the morning of surgery using a fresh bar of anti-bacterial soap (such as Dial) and clean washcloth.  Dry with a clean towel and dress in clean clothing.  • Ask your surgeon if you will be receiving antibiotics prior to surgery.  • Make sure you, your family, and all healthcare providers clean their hands with soap and water or an alcohol based hand  before caring for you or your wound.    Day of surgery:  Your arrival time is approximately two hours before your scheduled surgery time.  Upon arrival, a Pre-op nurse and Anesthesiologist will review your health history, obtain vital signs, and answer questions you may have.  The only belongings needed at this time will be a list of your home medications and if applicable your C-PAP/BI-PAP machine.  A Pre-op nurse will start an IV and you may receive medication in preparation for surgery, including something to help you relax.     Please be aware that surgery does come with discomfort.  We want to make every effort to control your discomfort so please discuss any uncontrolled symptoms with your nurse.   Your doctor will most likely have prescribed pain  medications.      If you are going home after surgery you will receive individualized written care instructions before being discharged.  A responsible adult must drive you to and from the hospital on the day of your surgery and stay with you for 24 hours.  Discharge prescriptions can be filled by the hospital pharmacy during regular pharmacy hours.  If you are having surgery late in the day/evening your prescription may be e-prescribed to your pharmacy.  Please verify your pharmacy hours or chose a 24 hour pharmacy to avoid not having access to your prescription because your pharmacy has closed for the day.    If you are staying overnight following surgery, you will be transported to your hospital room following the recovery period.  [facility] has all private rooms.    If you have any questions please call Pre-Admission Testing at [phone].  Deductibles and co-payments are collected on the day of service. Please be prepared to pay the required co-pay, deductible or deposit on the day of service as defined by your plan.    Patient Education for Self-Quarantine Process    • Following your COVID testing, we strongly recommend that you wear a mask when you are with other people and practice social distancing.   • Limit your activities to only required outings.  • Wash your hands with soap and water frequently for at least 20 seconds.   • Avoid touching your eyes, nose and mouth with unwashed hands.  • Do not share anything - utensils, drinking glasses, food from the same bowl.   • Sanitize household surfaces daily. Include all high touch areas (door handles, light switches, phones, countertops, etc.)    Call your surgeon immediately if you experience any of the following symptoms:  • Sore Throat  • Shortness of Breath or difficulty breathing  • Cough  • Chills  • Body soreness or muscle pain  • Headache  • Fever  • New loss of taste or smell  • Do not arrive for your surgery ill.  Your procedure  will need to be rescheduled to another time.  You will need to call your physician before the day of surgery to avoid any unnecessary exposure to hospital staff as well as other patients.

## 2021-07-12 ENCOUNTER — HOSPITAL ENCOUNTER (OUTPATIENT)
Facility: HOSPITAL | Age: 66
Setting detail: HOSPITAL OUTPATIENT SURGERY
Discharge: HOME | End: 2021-07-12
Attending: NEUROLOGICAL SURGERY | Admitting: NEUROLOGICAL SURGERY

## 2021-07-12 ENCOUNTER — APPOINTMENT (OUTPATIENT)
Dept: GENERAL RADIOLOGY | Facility: HOSPITAL | Age: 66
End: 2021-07-12

## 2021-07-12 ENCOUNTER — ANESTHESIA (OUTPATIENT)
Dept: PERIOP | Facility: HOSPITAL | Age: 66
End: 2021-07-12

## 2021-07-12 ENCOUNTER — ANESTHESIA EVENT (OUTPATIENT)
Dept: PERIOP | Facility: HOSPITAL | Age: 66
End: 2021-07-12

## 2021-07-12 VITALS
RESPIRATION RATE: 16 BRPM | OXYGEN SATURATION: 95 % | BODY MASS INDEX: 31.04 KG/M2 | HEIGHT: 71 IN | DIASTOLIC BLOOD PRESSURE: 73 MMHG | HEART RATE: 75 BPM | SYSTOLIC BLOOD PRESSURE: 124 MMHG | WEIGHT: 221.7 LBS | TEMPERATURE: 98.2 F

## 2021-07-12 DIAGNOSIS — M50.10 HERNIATION OF CERVICAL INTERVERTEBRAL DISC WITH RADICULOPATHY: ICD-10-CM

## 2021-07-12 PROCEDURE — 22551 ARTHRD ANT NTRBDY CERVICAL: CPT | Performed by: NEUROLOGICAL SURGERY

## 2021-07-12 PROCEDURE — 25010000002 FENTANYL CITRATE (PF) 50 MCG/ML SOLUTION: Performed by: NURSE ANESTHETIST, CERTIFIED REGISTERED

## 2021-07-12 PROCEDURE — 25010000002 PROPOFOL 10 MG/ML EMULSION: Performed by: NURSE ANESTHETIST, CERTIFIED REGISTERED

## 2021-07-12 PROCEDURE — 25010000003 CEFAZOLIN IN DEXTROSE 2-4 GM/100ML-% SOLUTION: Performed by: NURSE ANESTHETIST, CERTIFIED REGISTERED

## 2021-07-12 PROCEDURE — 25010000002 KETOROLAC TROMETHAMINE PER 15 MG: Performed by: NEUROLOGICAL SURGERY

## 2021-07-12 PROCEDURE — 25010000002 CEFAZOLIN PER 500 MG: Performed by: NEUROLOGICAL SURGERY

## 2021-07-12 PROCEDURE — 25010000002 ONDANSETRON PER 1 MG: Performed by: NURSE ANESTHETIST, CERTIFIED REGISTERED

## 2021-07-12 PROCEDURE — C1713 ANCHOR/SCREW BN/BN,TIS/BN: HCPCS | Performed by: NEUROLOGICAL SURGERY

## 2021-07-12 PROCEDURE — 22845 INSERT SPINE FIXATION DEVICE: CPT | Performed by: NEUROLOGICAL SURGERY

## 2021-07-12 PROCEDURE — 25010000002 HYDROMORPHONE PER 4 MG: Performed by: NURSE ANESTHETIST, CERTIFIED REGISTERED

## 2021-07-12 PROCEDURE — 76000 FLUOROSCOPY <1 HR PHYS/QHP: CPT

## 2021-07-12 PROCEDURE — 20931 SP BONE ALGRFT STRUCT ADD-ON: CPT | Performed by: NEUROLOGICAL SURGERY

## 2021-07-12 PROCEDURE — 25010000002 NEOSTIGMINE 5 MG/10ML SOLUTION: Performed by: NURSE ANESTHETIST, CERTIFIED REGISTERED

## 2021-07-12 PROCEDURE — C1889 IMPLANT/INSERT DEVICE, NOC: HCPCS | Performed by: NEUROLOGICAL SURGERY

## 2021-07-12 PROCEDURE — 72020 X-RAY EXAM OF SPINE 1 VIEW: CPT

## 2021-07-12 DEVICE — FLOSEAL HEMOSTATIC MATRIX, 10ML
Type: IMPLANTABLE DEVICE | Site: SPINE CERVICAL | Status: FUNCTIONAL
Brand: FLOSEAL HEMOSTATIC MATRIX

## 2021-07-12 DEVICE — SCRW SKYLINE VAR SD 14MM: Type: IMPLANTABLE DEVICE | Site: SPINE CERVICAL | Status: FUNCTIONAL

## 2021-07-12 DEVICE — ALLOGRFT BONE MATRIGRAFT DOWEL CLWRD PRESERV 13MM: Type: IMPLANTABLE DEVICE | Site: SPINE CERVICAL | Status: FUNCTIONAL

## 2021-07-12 DEVICE — PLT SKYLINE 1LVL 18MM: Type: IMPLANTABLE DEVICE | Site: SPINE CERVICAL | Status: FUNCTIONAL

## 2021-07-12 RX ORDER — HYDROMORPHONE HYDROCHLORIDE 1 MG/ML
0.5 INJECTION, SOLUTION INTRAMUSCULAR; INTRAVENOUS; SUBCUTANEOUS
Status: DISCONTINUED | OUTPATIENT
Start: 2021-07-12 | End: 2021-07-12 | Stop reason: HOSPADM

## 2021-07-12 RX ORDER — FENTANYL CITRATE 50 UG/ML
INJECTION, SOLUTION INTRAMUSCULAR; INTRAVENOUS AS NEEDED
Status: DISCONTINUED | OUTPATIENT
Start: 2021-07-12 | End: 2021-07-12 | Stop reason: SURG

## 2021-07-12 RX ORDER — HYDROCODONE BITARTRATE AND ACETAMINOPHEN 7.5; 325 MG/1; MG/1
1 TABLET ORAL ONCE AS NEEDED
Status: DISCONTINUED | OUTPATIENT
Start: 2021-07-12 | End: 2021-07-12 | Stop reason: HOSPADM

## 2021-07-12 RX ORDER — ASPIRIN 81 MG/1
81 TABLET ORAL DAILY
Start: 2021-07-14 | End: 2021-10-13

## 2021-07-12 RX ORDER — SODIUM CHLORIDE, SODIUM LACTATE, POTASSIUM CHLORIDE, CALCIUM CHLORIDE 600; 310; 30; 20 MG/100ML; MG/100ML; MG/100ML; MG/100ML
9 INJECTION, SOLUTION INTRAVENOUS CONTINUOUS
Status: DISCONTINUED | OUTPATIENT
Start: 2021-07-12 | End: 2021-07-12 | Stop reason: HOSPADM

## 2021-07-12 RX ORDER — EPHEDRINE SULFATE 50 MG/ML
5 INJECTION, SOLUTION INTRAVENOUS ONCE AS NEEDED
Status: DISCONTINUED | OUTPATIENT
Start: 2021-07-12 | End: 2021-07-12 | Stop reason: HOSPADM

## 2021-07-12 RX ORDER — HYDRALAZINE HYDROCHLORIDE 20 MG/ML
5 INJECTION INTRAMUSCULAR; INTRAVENOUS
Status: DISCONTINUED | OUTPATIENT
Start: 2021-07-12 | End: 2021-07-12 | Stop reason: HOSPADM

## 2021-07-12 RX ORDER — PROMETHAZINE HYDROCHLORIDE 25 MG/1
25 TABLET ORAL ONCE AS NEEDED
Status: DISCONTINUED | OUTPATIENT
Start: 2021-07-12 | End: 2021-07-12 | Stop reason: HOSPADM

## 2021-07-12 RX ORDER — LABETALOL HYDROCHLORIDE 5 MG/ML
5 INJECTION, SOLUTION INTRAVENOUS
Status: DISCONTINUED | OUTPATIENT
Start: 2021-07-12 | End: 2021-07-12 | Stop reason: HOSPADM

## 2021-07-12 RX ORDER — ONDANSETRON 2 MG/ML
4 INJECTION INTRAMUSCULAR; INTRAVENOUS EVERY 6 HOURS PRN
Status: CANCELLED | OUTPATIENT
Start: 2021-07-12

## 2021-07-12 RX ORDER — FAMOTIDINE 10 MG/ML
20 INJECTION, SOLUTION INTRAVENOUS ONCE
Status: COMPLETED | OUTPATIENT
Start: 2021-07-12 | End: 2021-07-12

## 2021-07-12 RX ORDER — FENTANYL CITRATE 50 UG/ML
50 INJECTION, SOLUTION INTRAMUSCULAR; INTRAVENOUS
Status: DISCONTINUED | OUTPATIENT
Start: 2021-07-12 | End: 2021-07-12 | Stop reason: HOSPADM

## 2021-07-12 RX ORDER — ONDANSETRON 2 MG/ML
4 INJECTION INTRAMUSCULAR; INTRAVENOUS ONCE AS NEEDED
Status: DISCONTINUED | OUTPATIENT
Start: 2021-07-12 | End: 2021-07-12 | Stop reason: HOSPADM

## 2021-07-12 RX ORDER — ROCURONIUM BROMIDE 10 MG/ML
INJECTION, SOLUTION INTRAVENOUS AS NEEDED
Status: DISCONTINUED | OUTPATIENT
Start: 2021-07-12 | End: 2021-07-12 | Stop reason: SURG

## 2021-07-12 RX ORDER — ONDANSETRON 4 MG/1
4 TABLET, FILM COATED ORAL EVERY 6 HOURS PRN
Status: CANCELLED | OUTPATIENT
Start: 2021-07-12

## 2021-07-12 RX ORDER — MAGNESIUM HYDROXIDE 1200 MG/15ML
LIQUID ORAL AS NEEDED
Status: DISCONTINUED | OUTPATIENT
Start: 2021-07-12 | End: 2021-07-12 | Stop reason: HOSPADM

## 2021-07-12 RX ORDER — LIDOCAINE HYDROCHLORIDE 40 MG/ML
SOLUTION TOPICAL AS NEEDED
Status: DISCONTINUED | OUTPATIENT
Start: 2021-07-12 | End: 2021-07-12 | Stop reason: SURG

## 2021-07-12 RX ORDER — TRAMADOL HYDROCHLORIDE 50 MG/1
50 TABLET ORAL EVERY 6 HOURS PRN
Qty: 40 TABLET | Refills: 0 | Status: SHIPPED | OUTPATIENT
Start: 2021-07-12 | End: 2022-04-15 | Stop reason: HOSPADM

## 2021-07-12 RX ORDER — TRAMADOL HYDROCHLORIDE 50 MG/1
50 TABLET ORAL EVERY 6 HOURS PRN
Status: DISCONTINUED | OUTPATIENT
Start: 2021-07-12 | End: 2021-07-12 | Stop reason: HOSPADM

## 2021-07-12 RX ORDER — MIDAZOLAM HYDROCHLORIDE 1 MG/ML
0.5 INJECTION INTRAMUSCULAR; INTRAVENOUS
Status: DISCONTINUED | OUTPATIENT
Start: 2021-07-12 | End: 2021-07-12 | Stop reason: HOSPADM

## 2021-07-12 RX ORDER — SODIUM CHLORIDE, SODIUM LACTATE, POTASSIUM CHLORIDE, CALCIUM CHLORIDE 600; 310; 30; 20 MG/100ML; MG/100ML; MG/100ML; MG/100ML
INJECTION, SOLUTION INTRAVENOUS CONTINUOUS PRN
Status: DISCONTINUED | OUTPATIENT
Start: 2021-07-12 | End: 2021-07-12 | Stop reason: SURG

## 2021-07-12 RX ORDER — HYDROMORPHONE HCL 110MG/55ML
PATIENT CONTROLLED ANALGESIA SYRINGE INTRAVENOUS AS NEEDED
Status: DISCONTINUED | OUTPATIENT
Start: 2021-07-12 | End: 2021-07-12 | Stop reason: SURG

## 2021-07-12 RX ORDER — CEFAZOLIN SODIUM 2 G/100ML
INJECTION, SOLUTION INTRAVENOUS AS NEEDED
Status: DISCONTINUED | OUTPATIENT
Start: 2021-07-12 | End: 2021-07-12 | Stop reason: SURG

## 2021-07-12 RX ORDER — SODIUM CHLORIDE AND POTASSIUM CHLORIDE 150; 900 MG/100ML; MG/100ML
75 INJECTION, SOLUTION INTRAVENOUS CONTINUOUS
Status: CANCELLED | OUTPATIENT
Start: 2021-07-12

## 2021-07-12 RX ORDER — FLUMAZENIL 0.1 MG/ML
0.2 INJECTION INTRAVENOUS AS NEEDED
Status: DISCONTINUED | OUTPATIENT
Start: 2021-07-12 | End: 2021-07-12 | Stop reason: HOSPADM

## 2021-07-12 RX ORDER — SODIUM CHLORIDE 0.9 % (FLUSH) 0.9 %
3 SYRINGE (ML) INJECTION EVERY 12 HOURS SCHEDULED
Status: DISCONTINUED | OUTPATIENT
Start: 2021-07-12 | End: 2021-07-12 | Stop reason: HOSPADM

## 2021-07-12 RX ORDER — NEOSTIGMINE METHYLSULFATE 0.5 MG/ML
INJECTION, SOLUTION INTRAVENOUS AS NEEDED
Status: DISCONTINUED | OUTPATIENT
Start: 2021-07-12 | End: 2021-07-12 | Stop reason: SURG

## 2021-07-12 RX ORDER — IBUPROFEN 600 MG/1
600 TABLET ORAL ONCE AS NEEDED
Status: DISCONTINUED | OUTPATIENT
Start: 2021-07-12 | End: 2021-07-12 | Stop reason: HOSPADM

## 2021-07-12 RX ORDER — LIDOCAINE HYDROCHLORIDE 20 MG/ML
INJECTION, SOLUTION INFILTRATION; PERINEURAL AS NEEDED
Status: DISCONTINUED | OUTPATIENT
Start: 2021-07-12 | End: 2021-07-12 | Stop reason: SURG

## 2021-07-12 RX ORDER — NALOXONE HCL 0.4 MG/ML
0.4 VIAL (ML) INJECTION
Status: CANCELLED | OUTPATIENT
Start: 2021-07-12

## 2021-07-12 RX ORDER — LIDOCAINE HYDROCHLORIDE 10 MG/ML
0.5 INJECTION, SOLUTION EPIDURAL; INFILTRATION; INTRACAUDAL; PERINEURAL ONCE AS NEEDED
Status: DISCONTINUED | OUTPATIENT
Start: 2021-07-12 | End: 2021-07-12 | Stop reason: HOSPADM

## 2021-07-12 RX ORDER — CYCLOBENZAPRINE HCL 10 MG
10 TABLET ORAL 3 TIMES DAILY PRN
Status: DISCONTINUED | OUTPATIENT
Start: 2021-07-12 | End: 2021-07-12 | Stop reason: HOSPADM

## 2021-07-12 RX ORDER — GLYCOPYRROLATE 0.2 MG/ML
INJECTION INTRAMUSCULAR; INTRAVENOUS AS NEEDED
Status: DISCONTINUED | OUTPATIENT
Start: 2021-07-12 | End: 2021-07-12 | Stop reason: SURG

## 2021-07-12 RX ORDER — ACETAMINOPHEN 325 MG/1
650 TABLET ORAL EVERY 4 HOURS PRN
Status: CANCELLED | OUTPATIENT
Start: 2021-07-12

## 2021-07-12 RX ORDER — PROMETHAZINE HYDROCHLORIDE 25 MG/1
25 SUPPOSITORY RECTAL ONCE AS NEEDED
Status: DISCONTINUED | OUTPATIENT
Start: 2021-07-12 | End: 2021-07-12 | Stop reason: HOSPADM

## 2021-07-12 RX ORDER — MORPHINE SULFATE 2 MG/ML
2 INJECTION, SOLUTION INTRAMUSCULAR; INTRAVENOUS EVERY 4 HOURS PRN
Status: CANCELLED | OUTPATIENT
Start: 2021-07-12 | End: 2021-07-22

## 2021-07-12 RX ORDER — DIPHENHYDRAMINE HYDROCHLORIDE 50 MG/ML
12.5 INJECTION INTRAMUSCULAR; INTRAVENOUS
Status: DISCONTINUED | OUTPATIENT
Start: 2021-07-12 | End: 2021-07-12 | Stop reason: HOSPADM

## 2021-07-12 RX ORDER — SODIUM CHLORIDE 0.9 % (FLUSH) 0.9 %
10 SYRINGE (ML) INJECTION AS NEEDED
Status: CANCELLED | OUTPATIENT
Start: 2021-07-12

## 2021-07-12 RX ORDER — PROPOFOL 10 MG/ML
VIAL (ML) INTRAVENOUS AS NEEDED
Status: DISCONTINUED | OUTPATIENT
Start: 2021-07-12 | End: 2021-07-12 | Stop reason: SURG

## 2021-07-12 RX ORDER — MIDAZOLAM HYDROCHLORIDE 1 MG/ML
1 INJECTION INTRAMUSCULAR; INTRAVENOUS
Status: DISCONTINUED | OUTPATIENT
Start: 2021-07-12 | End: 2021-07-12 | Stop reason: HOSPADM

## 2021-07-12 RX ORDER — SODIUM CHLORIDE 0.9 % (FLUSH) 0.9 %
3-10 SYRINGE (ML) INJECTION AS NEEDED
Status: DISCONTINUED | OUTPATIENT
Start: 2021-07-12 | End: 2021-07-12 | Stop reason: HOSPADM

## 2021-07-12 RX ORDER — NALOXONE HCL 0.4 MG/ML
0.2 VIAL (ML) INJECTION AS NEEDED
Status: DISCONTINUED | OUTPATIENT
Start: 2021-07-12 | End: 2021-07-12 | Stop reason: HOSPADM

## 2021-07-12 RX ORDER — DIPHENHYDRAMINE HCL 25 MG
25 CAPSULE ORAL
Status: DISCONTINUED | OUTPATIENT
Start: 2021-07-12 | End: 2021-07-12 | Stop reason: HOSPADM

## 2021-07-12 RX ORDER — EPHEDRINE SULFATE 50 MG/ML
INJECTION, SOLUTION INTRAVENOUS AS NEEDED
Status: DISCONTINUED | OUTPATIENT
Start: 2021-07-12 | End: 2021-07-12 | Stop reason: SURG

## 2021-07-12 RX ORDER — KETOROLAC TROMETHAMINE 15 MG/ML
15 INJECTION, SOLUTION INTRAMUSCULAR; INTRAVENOUS EVERY 6 HOURS PRN
Status: DISCONTINUED | OUTPATIENT
Start: 2021-07-12 | End: 2021-07-12 | Stop reason: HOSPADM

## 2021-07-12 RX ORDER — CYCLOBENZAPRINE HCL 10 MG
10 TABLET ORAL 3 TIMES DAILY PRN
Qty: 60 TABLET | Refills: 3 | Status: SHIPPED | OUTPATIENT
Start: 2021-07-12 | End: 2021-07-23

## 2021-07-12 RX ORDER — ONDANSETRON 2 MG/ML
INJECTION INTRAMUSCULAR; INTRAVENOUS AS NEEDED
Status: DISCONTINUED | OUTPATIENT
Start: 2021-07-12 | End: 2021-07-12 | Stop reason: SURG

## 2021-07-12 RX ORDER — OXYCODONE AND ACETAMINOPHEN 10; 325 MG/1; MG/1
1 TABLET ORAL EVERY 4 HOURS PRN
Status: DISCONTINUED | OUTPATIENT
Start: 2021-07-12 | End: 2021-07-12 | Stop reason: HOSPADM

## 2021-07-12 RX ORDER — SODIUM CHLORIDE 0.9 % (FLUSH) 0.9 %
3 SYRINGE (ML) INJECTION EVERY 12 HOURS SCHEDULED
Status: CANCELLED | OUTPATIENT
Start: 2021-07-12

## 2021-07-12 RX ORDER — CEFAZOLIN SODIUM IN 0.9 % NACL 3 G/100 ML
3 INTRAVENOUS SOLUTION, PIGGYBACK (ML) INTRAVENOUS ONCE
Status: COMPLETED | OUTPATIENT
Start: 2021-07-12 | End: 2021-07-12

## 2021-07-12 RX ADMIN — TRAMADOL HYDROCHLORIDE 50 MG: 50 TABLET, FILM COATED ORAL at 16:32

## 2021-07-12 RX ADMIN — FENTANYL CITRATE 50 MCG: 50 INJECTION INTRAMUSCULAR; INTRAVENOUS at 15:06

## 2021-07-12 RX ADMIN — NEOSTIGMINE METHYLSULFATE 2 MG: 0.5 INJECTION INTRAVENOUS at 15:54

## 2021-07-12 RX ADMIN — HYDROMORPHONE HYDROCHLORIDE 0.5 MG: 2 INJECTION, SOLUTION INTRAMUSCULAR; INTRAVENOUS; SUBCUTANEOUS at 16:09

## 2021-07-12 RX ADMIN — SODIUM CHLORIDE, POTASSIUM CHLORIDE, SODIUM LACTATE AND CALCIUM CHLORIDE: 600; 310; 30; 20 INJECTION, SOLUTION INTRAVENOUS at 13:40

## 2021-07-12 RX ADMIN — HYDROMORPHONE HYDROCHLORIDE 0.5 MG: 1 INJECTION, SOLUTION INTRAMUSCULAR; INTRAVENOUS; SUBCUTANEOUS at 16:45

## 2021-07-12 RX ADMIN — LIDOCAINE HYDROCHLORIDE 100 MG: 20 INJECTION, SOLUTION INFILTRATION; PERINEURAL at 14:27

## 2021-07-12 RX ADMIN — PROPOFOL 200 MG: 10 INJECTION, EMULSION INTRAVENOUS at 14:27

## 2021-07-12 RX ADMIN — CEFAZOLIN SODIUM 3 G: 10 INJECTION, POWDER, FOR SOLUTION INTRAVENOUS at 14:08

## 2021-07-12 RX ADMIN — CYCLOBENZAPRINE 10 MG: 10 TABLET, FILM COATED ORAL at 16:32

## 2021-07-12 RX ADMIN — LIDOCAINE HYDROCHLORIDE 1 EACH: 40 SOLUTION TOPICAL at 14:27

## 2021-07-12 RX ADMIN — SODIUM CHLORIDE, POTASSIUM CHLORIDE, SODIUM LACTATE AND CALCIUM CHLORIDE 9 ML/HR: 600; 310; 30; 20 INJECTION, SOLUTION INTRAVENOUS at 11:55

## 2021-07-12 RX ADMIN — GLYCOPYRROLATE 0.2 MG: 0.2 INJECTION INTRAMUSCULAR; INTRAVENOUS at 15:54

## 2021-07-12 RX ADMIN — KETOROLAC TROMETHAMINE 15 MG: 15 INJECTION, SOLUTION INTRAMUSCULAR; INTRAVENOUS at 16:38

## 2021-07-12 RX ADMIN — EPHEDRINE SULFATE 10 MG: 50 INJECTION INTRAVENOUS at 14:57

## 2021-07-12 RX ADMIN — FENTANYL CITRATE 50 MCG: 50 INJECTION INTRAMUSCULAR; INTRAVENOUS at 14:15

## 2021-07-12 RX ADMIN — CEFAZOLIN SODIUM 3 G: 2 INJECTION, SOLUTION INTRAVENOUS at 14:33

## 2021-07-12 RX ADMIN — FENTANYL CITRATE 50 MCG: 50 INJECTION INTRAMUSCULAR; INTRAVENOUS at 15:31

## 2021-07-12 RX ADMIN — ROCURONIUM BROMIDE 50 MG: 50 INJECTION INTRAVENOUS at 14:27

## 2021-07-12 RX ADMIN — FAMOTIDINE 20 MG: 10 INJECTION INTRAVENOUS at 12:15

## 2021-07-12 RX ADMIN — FENTANYL CITRATE 50 MCG: 50 INJECTION INTRAMUSCULAR; INTRAVENOUS at 14:27

## 2021-07-12 RX ADMIN — ONDANSETRON 4 MG: 2 INJECTION INTRAMUSCULAR; INTRAVENOUS at 15:48

## 2021-07-12 RX ADMIN — FENTANYL CITRATE 50 MCG: 50 INJECTION, SOLUTION INTRAMUSCULAR; INTRAVENOUS at 16:35

## 2021-07-12 NOTE — INTERVAL H&P NOTE
H&P reviewed. The patient was examined and there are no changes to the H&P.         Cheek Interpolation Flap Text: A decision was made to reconstruct the defect utilizing an interpolation axial flap and a staged reconstruction.  A telfa template was made of the defect.  This telfa template was then used to outline the Cheek Interpolation flap.  The donor area for the pedicle flap was then injected with anesthesia.  The flap was excised through the skin and subcutaneous tissue down to the layer of the underlying musculature.  The interpolation flap was carefully excised within this deep plane to maintain its blood supply.  The edges of the donor site were undermined.   The donor site was closed in a primary fashion.  The pedicle was then rotated into position and sutured.  Once the tube was sutured into place, adequate blood supply was confirmed with blanching and refill.  The pedicle was then wrapped with xeroform gauze and dressed appropriately with a telfa and gauze bandage to ensure continued blood supply and protect the attached pedicle.

## 2021-07-12 NOTE — ANESTHESIA PROCEDURE NOTES
Airway  Urgency: elective    Date/Time: 7/12/2021 2:27 PM  Airway not difficult    General Information and Staff    Patient location during procedure: OR  Anesthesiologist: Ruperto Fisher MD  CRNA: Jack Oviedo CRNA    Indications and Patient Condition  Indications for airway management: airway protection    Preoxygenated: yes  MILS not maintained throughout  Mask difficulty assessment: 1 - vent by mask    Final Airway Details  Final airway type: endotracheal airway      Successful airway: ETT  Cuffed: yes   Successful intubation technique: direct laryngoscopy  Facilitating devices/methods: anterior pressure/BURP  Endotracheal tube insertion site: oral  Blade: Mari  Blade size: 4  ETT size (mm): 7.5  Cormack-Lehane Classification: grade IIa - partial view of glottis  Placement verified by: chest auscultation and capnometry   Cuff volume (mL): 8  Measured from: lips  ETT/EBT  to lips (cm): 22  Number of attempts at approach: 1  Assessment: lips, teeth, and gum same as pre-op    Additional Comments  Pre O2, SIAI

## 2021-07-12 NOTE — DISCHARGE INSTRUCTIONS
[facility] Neurological Surgery  [address] [facility], [address]  [place] [address]  Phone:  [phone]  Fax:  [phone]    Dr. Zeus Robertson MD [facility]            Cervical Discectomy Post-Operative Instructions        1. It will be normal to have a sore throat and have some difficulty swallowing food for a couple of weeks following her surgery.  See your surgeon if this seems to be getting worse rather than better.  A few days of hoarseness is also typical.    2. You may resume your usual diet as you tolerate    3. No lifting overhead, although you may place your arms above your head.  DO NOT lift anything over five (5) pounds.  Walking is allowed and encouraged. There are no restrictions on sitting or climbing stairs, but refrain from overly strenuous activity.  You may notice that a constant position (standing or sitting) greater than 45 minutes may tend to make you more sore and therefore it is recommended that you change positions frequently. Do not drive until you are seen back for your first postoperative visit, although you may be a passenger in a car.      4. If you were given a cervical collar, it was given to you for comfort and if it does not help you do not need to wear it    5. Refrain from any sexual activity until after your first evaluation at the office.     6. Remove your dressing the day after your surgery and leave it off.  You may shower and pat the incision dry, but do not soak your wound in water such as a swimming pool, hot tub or lake.   Avoid direct sunlight to the wound for at least three (3) months.  You may apply ice to the surgical site for 15 to 20 minutes each hour while awake for the first few days following surgery.  Simply put the ice in a plastic bag in place a towel between the bag of ice and your skin.    7. You have Steri-Strips applied to the skin edges of your incision.  They should fall off in 7 to 10 days, however, if they do not fall off by the 10th day you may  remove them.    8. You will leave the hospital with prescriptions for pain medicine and a muscle relaxer.  These medications must be taken as prescribed only.  If a refill of your pain medication is required, in order to have this medication refilled, you must contact the office 3 days (72 hrs) prior to running out, but the amount prescribed is generally enough to last until the first post-operative visit.      9. A small amount of bleeding from the incision during the first few days is not unusual.      10.  You should have a post-operative visit approximately 2 weeks after surgery.  If you do not have a scheduled appointment, call the office at [phone] to schedule one.  We will discuss return to work at your first post-operative visit, but you can expect to be off of work for an average of 6 weeks.     11. Notify the office if there are any problems, such as:    a. Excessive neck or arm pain  b. Persistent temperature of 101.5° F (38.6 ° C) or greater that is not relieved by Tylenol.  c. Excessive bleeding, redness, heat, swelling or pus around the incision   d. If you cannot swallow, have difficulty breathing, have chest pain or shortness of breath, call 911.   e. Your pain is not controlled with medication  f. You seem to be getting worse rather than better    Thank you.

## 2021-07-12 NOTE — OP NOTE
Anterior Cervical Discectomy with Allograft and Instrumented Fusion C5C6 Operative Note    Nathan Burk  7/12/2021  [id number]    SURGEON   Zeus Robertson MD    ASSISTANT:  Shayy Short CSA was present throughout the entire surgery to provide intraoperative suction, retraction, suturing, and irrigation to promote visualization by the operative surgeon and assist with opening and closure.  The skilled assistance was necessary for the success of this case.  Our practice does not have a relationship with neurosurgical residents.    INDICATION:  Intractable C6 radiculopathy left upper extremity due to disc herniation left C5-C6    Pre-op Diagnosis:     Herniation of cervical intervertebral disc with radiculopathy [M50.10]    Post-op Diagnosis:     Herniation of cervical intervertebral disc with radiculopathy [M50.10]    PROCEDURE PERFORMED:    Anterior Cervical Discectomy and Fusion C5C6    1. Arthrodesis, anterior interbody, including disc space preparation, discectomy, osteophytectomy and decompression of spinal cord and/or nerve roots at C5C6  4. Anterior titanium instrumentation at C5C6, using [employer]uu [employer] Spine [place] instrumentation  3. Structural allograft bone dowel placement  5. Microdissection technique with the use of the operating microscope    Anesthesia: General    Staff:   Circulator: Ewelina Ruvalcaba RN  Scrub Person: Nelly Ventura  Vendor Representative: Bette Fox  Assistant: Shayy Short CSA    Estimated Blood Loss:  minimal    Specimens: None             Drains: None    Findings: Cervical disc extrusion to the left at C5-C6 with cervical spondylosis and stenosis centrally and to both neural foramen left greater than right    Complications: None immediate    Narrative Description:    Positioning: After operative consent the patient was taken to the operating room in stable condition and placed under general endotracheal anesthesia. Once adequate anesthesia was  established the patient was kept supine on the operating table with a shoulder roll beneath their shoulders. Their neck was placed in a slightly extended position to expose the anterior neck which was prepped and draped in the usual sterile fashion. All pressure points were padded along the extremities to protect neurologic function.    Approach: Using intraoperative fluoroscopy the anterior cervical spine was localized. The incision was made in the mid neck on the right and taken through the platysma layer. Blunt dissection was then used to expose the anterior cervical spine medial to the carotid artery and lateral to the esophagus. A needle was placed in C5C6 to confirm the level fluoroscopically. The Trimline retractor was placed beneath the longus colli muscles bilaterally. The distraction pins, of 14 mm depth were placed at C5C6 and distraction turned against those disc spaces.     Operating Microscope: The operating microscope was draped using sterile technique and brought into the field and the rest of the operation was performed under operative magnification with microdissection technique and micro-illumination with the aid of the operating microscope.    Cervical Discectomy/Decompression:  A #11 blade was used to sharply incise the disc spaces and a pituitary ronguer was used to remove the discs. A 12.5mm cylindrical drill bit was used with a [place] pneumatic drill to perform a Cloward style resection of the disc and portions of the end plate (approximately 30% of the vertebral bodies) to prepare and decompress the disc space. The microscope was then used to perform microdissection along bilateral neural foramen.  Several fragments of disc herniation were found along the C6 nerve root on the left at C5C6.  Bony stenosis was also apparent left greater than right and this was decompressed through the foraminotomy approach after doing the central decompression with a Cloward technique.  The wound  was copiously irrigated and SurgiFlow was used for hemostasis.    Bone Dowel Arthodesis: An Allograft 13mm bone dowel was fashioned to fit the disc  defect and affixed by compressing across the grafts.   The distraction pins were removed and the residual holes were filled with bone wax.    Anterior Instrumentation:  A 18mm Depuy Spine [place] plate was fashioned to fit the anterior spine across C5C6 and fixed into position with four 14 mm screws. The tightening cam was locked into position. Fluoroscopy documented placement of the plate.    Closure: The deep platysma was reapproximated with the 2-O Vicryl suture and the superficial skin was closed with 3-O Vicryl suture. Steri-strips were applied and the needle and sponge counts were correct at the end of the case. The patient was transported to postop recovery in stable condition.    Zeus Robertson MD     Date: 7/12/2021  Time: 16:07 EDT

## 2021-07-12 NOTE — ANESTHESIA POSTPROCEDURE EVALUATION
"Patient: Nathan Burk    Procedure Summary     Date: 07/12/21 Room / Location: [facility] OR [address] [place] [address] MAIN OR    Anesthesia Start: 1412 Anesthesia Stop: 1613    Procedure: Anterior cervical discectomy cervical 5 cervical 6 with allograft (cadaver) bone fusion and titanium instrumentation (Bilateral Spine Cervical) Diagnosis:       Herniation of cervical intervertebral disc with radiculopathy      (Herniation of cervical intervertebral disc with radiculopathy [M50.10])    Surgeons: Zeus Robertson MD Provider: Ruperto Fisher MD    Anesthesia Type: general ASA Status: 3          Anesthesia Type: general    Vitals  Vitals Value Taken Time   /74 07/12/21 1700   Temp 36.8 °C (98.2 °F) 07/12/21 1655   Pulse 70 07/12/21 1704   Resp 18 07/12/21 1645   SpO2 93 % 07/12/21 1704   Vitals shown include unvalidated device data.        Post Anesthesia Care and Evaluation    Patient location during evaluation: PACU  Patient participation: complete - patient participated  Level of consciousness: awake and alert  Pain management: adequate  Airway patency: patent  Anesthetic complications: No anesthetic complications    Cardiovascular status: acceptable  Respiratory status: acceptable  Hydration status: acceptable    Comments: /79   Pulse 75   Temp 36.8 °C (98.2 °F) (Oral)   Resp 18   Ht 180.3 cm (71\")   Wt 101 kg (221 lb 11.2 oz)   SpO2 90%   BMI 30.92 kg/m²         "

## 2021-07-12 NOTE — ANESTHESIA PREPROCEDURE EVALUATION
Anesthesia Evaluation     Patient summary reviewed   no history of anesthetic complications:  NPO Solid Status: > 8 hours  NPO Liquid Status: > 2 hours           Airway   Mallampati: II  TM distance: >3 FB  Neck ROM: full  No difficulty expected  Dental      Pulmonary     breath sounds clear to auscultation  (+) a smoker Former, asthma (allergy induced),  (-) shortness of breath, recent URI  Cardiovascular   Exercise tolerance: good (4-7 METS)    Rhythm: regular  Rate: normal    (+) hyperlipidemia,   (-) past MI, dysrhythmias, angina      Neuro/Psych  (+) numbness (Cervical radic),     (-) seizures, CVA  GI/Hepatic/Renal/Endo    (+) obesity,  GERD,  liver disease (NA) fatty liver disease,     Musculoskeletal     (+) neck pain,   (-) neck stiffness  Abdominal    Substance History      OB/GYN          Other   arthritis,      (-) blood dyscrasia                    Anesthesia Plan    ASA 3     general     intravenous induction     Anesthetic plan, all risks, benefits, and alternatives have been provided, discussed and informed consent has been obtained with: patient.    Plan discussed with CRNA.

## 2021-07-13 ENCOUNTER — TELEPHONE (OUTPATIENT)
Dept: NEUROSURGERY | Facility: CLINIC | Age: 66
End: 2021-07-13

## 2021-07-13 NOTE — TELEPHONE ENCOUNTER
First 3 days are the most difficult but because the esophagus was retracted off the spine and he will notice a thick sensation in the esophagus for a few weeks.  Cool beverages and throat lozenges help the most during the first few days.

## 2021-07-13 NOTE — TELEPHONE ENCOUNTER
Please call back.  Patient is asking how long a sore throat should be expected after surgery.    Thank you

## 2021-07-14 NOTE — PROGRESS NOTES
"Subjective   History of Present Illness: Nathan Burk is a 66 y.o. male is here today for follow-up. Mr. Burk is 11 days out from having a C5C6 ACDF on 07-12-21.    Today, Mr. Burk reports he is doing well. He does have occasional neck pain and right shoulder pain. He denies numbness, tingling and weakness. He has been taking OTC ibuprofen. The incision looks clean and dry. No redness, swelling or drainage. Patient denies having fever. On a scale of 0-10, the patient rates his pain a 1.  He says he has not had to take the pain medicine beyond 24 hours after surgery.  He did have some swallowing difficulty for 48 hours after surgery but that resolved.  There was a sore throat and some nasal drainage that is making him cough.  He denies any fever.  He says he has been relieved of the left arm pain since the moment he woke up from surgery.    While in the room and during my examination of the patient I wore a mask and eye protection.  I washed my hands before and after this patient encounter.  The patient was also wearing a mask.    History of Present Illness    The following portions of the patient's history were reviewed and updated as appropriate: allergies, current medications, past family history, past medical history, past social history, past surgical history and problem list.    Review of Systems   Respiratory: Negative for chest tightness and shortness of breath.    Cardiovascular: Negative for chest pain.   Musculoskeletal: Positive for neck pain and neck stiffness.       Objective     Vitals:    07/23/21 1030   BP: 138/72   Temp: 98.2 °F (36.8 °C)   Weight: 100 kg (221 lb)   Height: 180.3 cm (71\")     Body mass index is 30.82 kg/m².      Physical Exam  Neurologic Exam    Physical Exam:    CONSTITUTIONAL:  appears well developed, well-nourished and in no acute distress.    NECK: the neck is supple and symmetric. The trachea is midline with no masses.  Her neck incision is healed well    PULMONARY: " Respiratory effort is normal with no increased work of breathing or signs of respiratory distress.    CARDIOVASCULAR: Pedal pulses are +2/4 bilaterally. Examination of the extremities shows no edema or varicosities.    MUSCULOSKELETAL: Gait normal    SKIN: The skin is warm, dry and intact.  An incision healed    NEUROLOGIC:   Normal motor strength noted. Muscle bulk and tone are normal.  Sensory exam is normal to all modalities.  Areflexia in the upper extremities consistent with preop  No abnormal reflexes  Cortical function is intact and without deficits. Speech is normal.    PSYCHIATRIC: oriented to person, place and time. Patient's mood and affect are normal.      Assessment/Plan     Medical Decision Making:      The patient can now lift up to 25 lbs. and may soak the incision in a bath or pool if desired. They will arrange Physical Therapy 2-3 times per week for 3 weeks to initiate a home exercise plan. It is safe for the patient to drive if they are comfortable driving and not consuming narcotic pain medications. Follow up is arranged following PT. All questions were reviewed and answered.    Return in about 4 weeks (around 8/20/2021) for discussion of Physical Therapy results.    Diagnoses and all orders for this visit:    1. Herniation of cervical intervertebral disc with radiculopathy (Primary)  -     Ambulatory Referral to Physical Therapy Evaluate and treat  -     XR Spine Cervical Complete 4 or 5 View; Future    2. Status post neck surgery, follow-up exam  -     Ambulatory Referral to Physical Therapy Evaluate and treat  -     XR Spine Cervical Complete 4 or 5 View; Future             [facility]us Robertson MD [facility]  [facility]

## 2021-07-23 ENCOUNTER — OFFICE VISIT (OUTPATIENT)
Dept: NEUROSURGERY | Facility: CLINIC | Age: 66
End: 2021-07-23

## 2021-07-23 VITALS
DIASTOLIC BLOOD PRESSURE: 72 MMHG | BODY MASS INDEX: 30.94 KG/M2 | WEIGHT: 221 LBS | TEMPERATURE: 98.2 F | HEIGHT: 71 IN | SYSTOLIC BLOOD PRESSURE: 138 MMHG

## 2021-07-23 DIAGNOSIS — Z09 STATUS POST NECK SURGERY, FOLLOW-UP EXAM: ICD-10-CM

## 2021-07-23 DIAGNOSIS — M50.10 HERNIATION OF CERVICAL INTERVERTEBRAL DISC WITH RADICULOPATHY: Primary | ICD-10-CM

## 2021-07-23 PROCEDURE — 99024 POSTOP FOLLOW-UP VISIT: CPT | Performed by: NEUROLOGICAL SURGERY

## 2021-08-13 ENCOUNTER — HOSPITAL ENCOUNTER (OUTPATIENT)
Dept: GENERAL RADIOLOGY | Facility: HOSPITAL | Age: 66
Discharge: HOME | End: 2021-08-13
Admitting: NEUROLOGICAL SURGERY

## 2021-08-13 DIAGNOSIS — M50.10 HERNIATION OF CERVICAL INTERVERTEBRAL DISC WITH RADICULOPATHY: ICD-10-CM

## 2021-08-13 DIAGNOSIS — Z09 STATUS POST NECK SURGERY, FOLLOW-UP EXAM: ICD-10-CM

## 2021-08-13 PROCEDURE — 72050 X-RAY EXAM NECK SPINE 4/5VWS: CPT

## 2021-08-24 ENCOUNTER — HOSPITAL ENCOUNTER (OUTPATIENT)
Dept: CARDIOLOGY | Facility: HOSPITAL | Age: 66
Discharge: HOME | End: 2021-08-24

## 2021-08-24 ENCOUNTER — OFFICE VISIT (OUTPATIENT)
Dept: FAMILY MEDICINE CLINIC | Facility: CLINIC | Age: 66
End: 2021-08-24

## 2021-08-24 VITALS
HEIGHT: 71 IN | WEIGHT: 218 LBS | HEART RATE: 98 BPM | BODY MASS INDEX: 30.52 KG/M2 | OXYGEN SATURATION: 95 % | DIASTOLIC BLOOD PRESSURE: 87 MMHG | TEMPERATURE: 96.8 F | SYSTOLIC BLOOD PRESSURE: 139 MMHG

## 2021-08-24 VITALS
HEART RATE: 98 BPM | SYSTOLIC BLOOD PRESSURE: 121 MMHG | BODY MASS INDEX: 29.96 KG/M2 | HEIGHT: 71 IN | OXYGEN SATURATION: 94 % | DIASTOLIC BLOOD PRESSURE: 79 MMHG | TEMPERATURE: 97 F | WEIGHT: 214 LBS

## 2021-08-24 DIAGNOSIS — R07.89 ATYPICAL CHEST PAIN: Primary | ICD-10-CM

## 2021-08-24 DIAGNOSIS — R07.2 PRECORDIAL PAIN: ICD-10-CM

## 2021-08-24 DIAGNOSIS — R06.02 SHORTNESS OF BREATH: ICD-10-CM

## 2021-08-24 DIAGNOSIS — R94.31 ABNORMAL ECG: Primary | ICD-10-CM

## 2021-08-24 DIAGNOSIS — K21.9 GASTROESOPHAGEAL REFLUX DISEASE, UNSPECIFIED WHETHER ESOPHAGITIS PRESENT: ICD-10-CM

## 2021-08-24 DIAGNOSIS — R94.31 ABNORMAL ECG: ICD-10-CM

## 2021-08-24 LAB
BH CV NUCLEAR PRIOR STUDY: 2
BH CV REST NUCLEAR ISOTOPE DOSE: 10.8 MCI
BH CV STRESS BP STAGE 1: NORMAL
BH CV STRESS BP STAGE 2: NORMAL
BH CV STRESS DURATION MIN STAGE 1: 3
BH CV STRESS DURATION MIN STAGE 2: 3
BH CV STRESS DURATION SEC STAGE 1: 0
BH CV STRESS DURATION SEC STAGE 2: 0
BH CV STRESS GRADE STAGE 1: 10
BH CV STRESS GRADE STAGE 2: 12
BH CV STRESS HR STAGE 1: 120
BH CV STRESS HR STAGE 2: 138
BH CV STRESS METS STAGE 1: 5
BH CV STRESS METS STAGE 2: 7.5
BH CV STRESS NUCLEAR ISOTOPE DOSE: 35.8 MCI
BH CV STRESS PROTOCOL 1: NORMAL
BH CV STRESS RECOVERY BP: NORMAL MMHG
BH CV STRESS RECOVERY HR: 97 BPM
BH CV STRESS SPEED STAGE 1: 1.7
BH CV STRESS SPEED STAGE 2: 2.5
BH CV STRESS STAGE 1: 1
BH CV STRESS STAGE 2: 2
LV EF NUC BP: 55 %
MAXIMAL PREDICTED HEART RATE: 155 BPM
PERCENT MAX PREDICTED HR: 89.03 %
STRESS BASELINE BP: NORMAL MMHG
STRESS BASELINE HR: 90 BPM
STRESS PERCENT HR: 105 %
STRESS POST ESTIMATED WORKLOAD: 7 METS
STRESS POST EXERCISE DUR MIN: 6 MIN
STRESS POST EXERCISE DUR SEC: 0 SEC
STRESS POST PEAK BP: NORMAL MMHG
STRESS POST PEAK HR: 138 BPM
STRESS TARGET HR: 132 BPM

## 2021-08-24 PROCEDURE — 0 TECHNETIUM TETROFOSMIN KIT: Performed by: INTERNAL MEDICINE

## 2021-08-24 PROCEDURE — 93016 CV STRESS TEST SUPVJ ONLY: CPT | Performed by: INTERNAL MEDICINE

## 2021-08-24 PROCEDURE — 99214 OFFICE O/P EST MOD 30 MIN: CPT | Performed by: FAMILY MEDICINE

## 2021-08-24 PROCEDURE — 78452 HT MUSCLE IMAGE SPECT MULT: CPT | Performed by: INTERNAL MEDICINE

## 2021-08-24 PROCEDURE — 93017 CV STRESS TEST TRACING ONLY: CPT

## 2021-08-24 PROCEDURE — A9502 TC99M TETROFOSMIN: HCPCS | Performed by: INTERNAL MEDICINE

## 2021-08-24 PROCEDURE — 78452 HT MUSCLE IMAGE SPECT MULT: CPT

## 2021-08-24 PROCEDURE — 93018 CV STRESS TEST I&R ONLY: CPT | Performed by: INTERNAL MEDICINE

## 2021-08-24 PROCEDURE — 93005 ELECTROCARDIOGRAM TRACING: CPT | Performed by: INTERNAL MEDICINE

## 2021-08-24 PROCEDURE — 25010000002 REGADENOSON 0.4 MG/5ML SOLUTION: Performed by: INTERNAL MEDICINE

## 2021-08-24 PROCEDURE — 99204 OFFICE O/P NEW MOD 45 MIN: CPT | Performed by: INTERNAL MEDICINE

## 2021-08-24 PROCEDURE — 93010 ELECTROCARDIOGRAM REPORT: CPT | Performed by: INTERNAL MEDICINE

## 2021-08-24 PROCEDURE — 94760 N-INVAS EAR/PLS OXIMETRY 1: CPT

## 2021-08-24 RX ORDER — NITROGLYCERIN 0.4 MG/1
0.4 TABLET SUBLINGUAL
Status: SHIPPED | OUTPATIENT
Start: 2021-08-24

## 2021-08-24 RX ORDER — SODIUM CHLORIDE 0.9 % (FLUSH) 0.9 %
10 SYRINGE (ML) INJECTION AS NEEDED
Status: DISCONTINUED | OUTPATIENT
Start: 2021-08-24 | End: 2021-09-13

## 2021-08-24 RX ADMIN — TETROFOSMIN 1 DOSE: 1.38 INJECTION, POWDER, LYOPHILIZED, FOR SOLUTION INTRAVENOUS at 12:39

## 2021-08-24 RX ADMIN — TETROFOSMIN 1 DOSE: 1.38 INJECTION, POWDER, LYOPHILIZED, FOR SOLUTION INTRAVENOUS at 13:13

## 2021-08-24 RX ADMIN — REGADENOSON 0.4 MG: 0.08 INJECTION, SOLUTION INTRAVENOUS at 13:13

## 2021-08-24 NOTE — PROGRESS NOTES
"Chief Complaint  Chest Pain (FOLLOW UP FROM ER)    Subjective          Nathan Burk presents to [facility]  History of Present Illness     66-year-old male with history of hyperlipidemia and asthma on atorvastatin and aspirin developed chest pain yesterday afternoon after eating a sandwich.  He was sitting on his couch.  He felt discomfort under his sternum.  No radiation.  No sweating.  No other symptoms.  It was mild at first and then became more problematic.  He was in line for carpool for his son and the symptoms got much worse so he ended up going to the emergency room.  This was yesterday afternoon.  He was in the emergency room for couple of hours and then left because of it being crowded.  He had more symptoms last night and went back to the emergency room.  He spent the night in the emergency room.  He had serial EKGs and troponins that were not thought be of concern by the ER doctors.  The troponins were negative.  The EKG showed nonspecific changes.  The pain persisted overnight and now it is nearly gone.  He points to his sternum.  He states times it hurt worse yesterday with a deep breath.  He has had no fever.  He does not feel short of air today.  He had a chest x-ray that was read as normal at [facility].  He otherwise feels well.  There is been no trauma.  No rash.    He walked up 5 flights of stairs in our office building today to get to our office.  He had no worsening of the chest discomfort.    Objective   Vital Signs:   /87   Pulse 98   Temp 96.8 °F (36 °C) (Temporal)   Ht 180.3 cm (70.98\")   Wt 98.9 kg (218 lb)   SpO2 95%   BMI 30.42 kg/m²     Physical Exam  Constitutional:       General: He is not in acute distress.     Appearance: Normal appearance. He is obese. He is not ill-appearing, toxic-appearing or diaphoretic.   HENT:      Head: Atraumatic.   Eyes:      Conjunctiva/sclera: Conjunctivae normal.   Cardiovascular:      Rate and Rhythm: " Normal rate and regular rhythm.      Pulses: Normal pulses.      Heart sounds: Normal heart sounds. No murmur heard.     Pulmonary:      Effort: Pulmonary effort is normal. No respiratory distress.      Breath sounds: Normal breath sounds. No wheezing or rales.   Abdominal:      General: Bowel sounds are normal. There is no distension.      Palpations: Abdomen is soft. There is no mass.      Tenderness: There is no abdominal tenderness. There is no guarding or rebound.      Hernia: No hernia is present.   Musculoskeletal:      Cervical back: Normal range of motion and neck supple.   Skin:     Findings: No rash.   Neurological:      Mental Status: He is alert and oriented to person, place, and time.   Psychiatric:         Mood and Affect: Mood normal.         Behavior: Behavior normal.        Result Review :     Common labs    Common Labsle 3/25/21 3/25/21 3/25/21 3/25/21 7/9/21 7/9/21 8/23/21    0809 0809 0809 0809 1250 1250    Glucose      102 (A)    Glucose  105 (A)        BUN  17    17    Creatinine  1.02    0.80    eGFR Non  Am  73    97    eGFR African Am  89        Sodium  142    140    Potassium  5.0    4.3    Chloride  101    105    Calcium  9.5    9.5    Total Protein  6.6        Albumin  4.50        Total Bilirubin  0.8        Alkaline Phosphatase  85        AST (SGOT)  27        ALT (SGPT)  37        WBC     9.26  9.18   Hemoglobin     15.0  15.2   Hematocrit     44.6  45.1   Platelets     233  257   Total Cholesterol   183       Triglycerides   120       HDL Cholesterol   46       LDL Cholesterol    115 (A)       Hemoglobin A1C 6.00 (A)         PSA    0.521      (A) Abnormal value       Comments are available for some flowsheets but are not being displayed.                     Assessment and Plan    Diagnoses and all orders for this visit:    1. Atypical chest pain (Primary)  -     Ambulatory Referral Chest Pain Clinic      Atypical chest pain with a negative initial cardiac work-up at the  emergency room.  I cannot completely rule out significant atherosclerotic coronary artery disease.  I think this further needs to be evaluated ASAP.  In terms of differential diagnosis, pulmonary embolism is less likely.  Pneumonia is very unlikely.  Muscular skeletal chest wall pain or GI symptoms may be a possibility.  He is currently stable with minimal to no discomfort.  He was able to walk up 5 flights of stairs at our office without any worsening of his discomfort.  He is going to go to the cardiologist today for a ASAP evaluation.  At this time he is stable to drive.    I will see him for follow-up thereafter.      Follow Up   No follow-ups on file.  Patient was given instructions and counseling regarding his condition or for health maintenance advice. Please see specific information pulled into the AVS if appropriate.

## 2021-08-24 NOTE — PROGRESS NOTES
Phone call with patient.  The perfusion stress test today was negative.  At this time cardiac work-up is complete assuming no change in improving clinical course or character of discomfort.  He will continue his Prevacid.  He has had occasional dysphagia.  He has a colonoscopy scheduled for about a month.  I am placing referral for EGD.  With very severe symptoms he should seek medical attention immediately.  I will see him back as scheduled.  He will call sooner with questions.

## 2021-08-24 NOTE — PROGRESS NOTES
Subjective:     Encounter Date:08/24/21      Patient ID: Nathan Burk is a 66 y.o. male.    Chief Complaint: CP  History of Present Illness    Dear Dr. Rush,    You are seeing this patient in the Cardiac Evaluation Clinic today for evaluation.  Patient comes in for evaluation of chest discomfort.    Patient reports left precordial and left inframammary discomfort.  When it occurs it will typically last for minutes at a time.  It is not associated with activity or exercise. Yesterday he gone to the emergency room because he had discomfort.  This was at [facility].  He waited a while but was not seen he was uncomfortable waiting in the waiting room with a lot of people it appeared to be ill.  He then left without being seen.  He then called the on-call physician, was instructed to take Mylanta, and if that did not relieve to go back to the emergency room.  He did not feel better so he went back.  He was ultimately seen in the emergency room.    Patient states that he was sitting at home sitting on the couch.  He was can be picking his son up at school.  He had just eaten a sandwich for lunch.  He had a sudden onset of substernal and left precordial/left inframammary discomfort.  It did not radiate.  He did not have any nausea or vomiting and he was not diaphoretic.  It gradually went away.  In part he left the emergency room initially because he became pain-free while he was waiting.    He laid down about 930 because he was tired but woke up at 11 with chest discomfort again.    This morning he says he feels okay.  He is not have any chest discomfort.  He was in to see you and then was referred over the [facility] for further evaluation and treatment.    This patient has no known cardiac history.  This patient has no history of coronary artery disease, congestive heart failure, rheumatic fever, rheumatic heart disease, congenital heart disease or heart murmur.  This patient has never required invasive cardiovascular  evaluation.        The following portions of the patient's history were reviewed and updated as appropriate: allergies, current medications, past family history, past medical history, past social history, past surgical history and problem list.    Past Medical History:   Diagnosis Date   • Allergic    • Arthritis    • Asthma     ALLERGY INDUCED   • Cervical radiculopathy    • Gastroesophageal reflux disease without esophagitis 2/8/2018   • Hyperlipidemia    • NAFLD (nonalcoholic fatty liver disease) 11/13/2018   • Neck pain    • Osteoarthritis    • Skin cancer of arm, right        Past Surgical History:   Procedure Laterality Date   • ANTERIOR CERVICAL DISCECTOMY W/ FUSION Bilateral 7/12/2021    Procedure: Anterior cervical discectomy cervical 5 cervical 6 with allograft (cadaver) bone fusion and titanium instrumentation;  Surgeon: Zeus Robertson MD;  Location: [facility] MAIN OR;  Service: Neurosurgery;  Laterality: Bilateral;   • COLONOSCOPY N/A 12/5/2017    Procedure: COLONOSCOPY to cecum and t.i.;  Surgeon: Rik Roldan MD;  Location: [facility] ENDOSCOPY;  Service:    • FRACTURE SURGERY      left foot   • HERNIA REPAIR     • KNEE MENISCAL REPAIR Right 2018   • KNEE SURGERY  04/2018    right knee   • SINUS SURGERY     • TONSILLECTOMY     • TOTAL KNEE ARTHROPLASTY Bilateral 2019   • VASECTOMY             ECG 12 Lead    Date/Time: 8/24/2021 12:14 PM  Performed by: Prashanth Moctezuma III, MD  Authorized by: Prashanth Moctezuma III, MD   Comparison: compared with previous ECG   Similar to previous ECG  Rhythm: sinus rhythm  Rate: normal  Conduction: conduction normal  ST Segments: ST segments normal  T Waves: T waves normal  QRS axis: normal  Other: no other findings    Clinical impression: normal ECG               Objective:     Vitals:    08/24/21 1055   BP: 121/79   BP Location: Right arm   Patient Position: Sitting   Pulse: 98   Temp: 97 °F (36.1 °C)   TempSrc: Oral   SpO2: 94%   Weight: 97.1 kg (214 lb)   Height:  "180.3 cm (71\")         Vitals reviewed.   Constitutional:       General: Not in acute distress.     Appearance: Well-developed. Not diaphoretic.   Eyes:      General:         Right eye: No discharge.         Left eye: No discharge.      Conjunctiva/sclera: Conjunctivae normal.      Pupils: Pupils are equal, round, and reactive to light.   HENT:      Head: Normocephalic and atraumatic.      Nose: Nose normal.   Neck:      Thyroid: No thyromegaly.      Trachea: No tracheal deviation.      Lymphadenopathy: No cervical adenopathy.   Pulmonary:      Effort: Pulmonary effort is normal. No respiratory distress.      Breath sounds: Normal breath sounds. No stridor.   Chest:      Chest wall: Not tender to palpatation.   Cardiovascular:      Normal rate. Regular rhythm.      Murmurs: There is no murmur.      . No S3 gallop. No click. No rub.   Pulses:     Intact distal pulses.   Edema:     Peripheral edema absent.   Abdominal:      General: Bowel sounds are normal. There is no distension.      Palpations: Abdomen is soft. There is no abdominal mass.      Tenderness: There is no abdominal tenderness. There is no guarding or rebound.   Musculoskeletal: Normal range of motion.         General: No tenderness or deformity.      Cervical back: Normal range of motion and neck supple. Skin:     General: Skin is warm and dry.      Findings: No erythema or rash.   Neurological:      Mental Status: Alert and oriented to person, place, and time.      Deep Tendon Reflexes: Reflexes are normal and symmetric.   Psychiatric:         Thought Content: Thought content normal.         Lab Review:   Lab Results   Component Value Date    GLUCOSE 102 (H) 07/09/2021    BUN 17 07/09/2021    CREATININE 0.80 07/09/2021    EGFRIFNONA 97 07/09/2021    EGFRIFAFRI 89 03/25/2021    BCR 21.3 07/09/2021    K 4.3 07/09/2021    CO2 26.5 07/09/2021    CALCIUM 9.5 07/09/2021    PROTENTOTREF 6.6 03/25/2021    ALBUMIN 4.50 03/25/2021    LABIL2 2.1 03/25/2021    AST " 27 03/25/2021    ALT 37 03/25/2021     CBC    CBC 7/9/21 8/23/21   WBC 9.26 9.18   RBC 4.87 5.01   Hemoglobin 15.0 15.2   Hematocrit 44.6 45.1   MCV 91.6 90.0   MCH 30.8 30.3   MCHC 33.6 33.7   RDW 12.7 12.7   Platelets 233 257           No results found for: PROBNP  No components found for: TROP  No results found for: DDIMERQUANT      Performed        Assessment:          Diagnosis Plan   1. Precordial pain  Cardiac Monitoring    Vital Signs - Once    Vital Signs - As Needed    Pulse Oximetry    Oxygen Therapy- Nasal Cannula; Titrate for SPO2: 92%, equal to or greater than    Insert Peripheral IV    sodium chloride 0.9 % flush 10 mL    nitroglycerin (NITROSTAT) SL tablet 0.4 mg    NPO Diet    Bathroom Privileges With Assistance    ECG 12 Lead    Cardiac Monitoring    Vital Signs - Once    Insert Peripheral IV    NPO Diet    Bathroom Privileges With Assistance   2. Shortness of breath  Cardiac Monitoring    Vital Signs - Once    Vital Signs - As Needed    Pulse Oximetry    Oxygen Therapy- Nasal Cannula; Titrate for SPO2: 92%, equal to or greater than    Insert Peripheral IV    sodium chloride 0.9 % flush 10 mL    nitroglycerin (NITROSTAT) SL tablet 0.4 mg    NPO Diet    Bathroom Privileges With Assistance    ECG 12 Lead    Cardiac Monitoring    Vital Signs - Once    Insert Peripheral IV    NPO Diet    Bathroom Privileges With Assistance          Plan:       1.  Chest discomfort with both typical and atypical components, unremarkable evaluation in the emergency room, we performed a stress Cardiolite that shows no ischemia and normal function.  No evidence of a cardiac etiology of the patient's discomfort.  2.  Mixed hyperlipidemia, AST, ALT reviewed from yesterday.    Thank you very much for allowing us to participate in the care of this pleasant patient.  Please don't hesitate to call if I can be of assistance in any way.      Current Outpatient Medications:   •  ANORO ELLIPTA 62.5-25 MCG/INH aerosol powder   inhaler, Inhale 1 puff Daily., Disp: , Rfl: 6  •  ascorbic acid (VITAMIN C) 1000 MG tablet, Take 1,000 mg by mouth Daily., Disp: , Rfl:   •  aspirin 81 MG EC tablet, Take 1 tablet by mouth Daily. PT HOLDING FOR SURGERY, Disp: , Rfl:   •  atorvastatin (LIPITOR) 40 MG tablet, TAKE 1 TABLET BY MOUTH EVERY DAY (Patient taking differently: Take 40 mg by mouth Daily.), Disp: 90 tablet, Rfl: 1  •  azelastine (ASTELIN) 0.1 % nasal spray, 1 spray into each nostril Daily. Use in each nostril as directed, Disp: , Rfl:   •  cholecalciferol (VITAMIN D3) 10 MCG (400 UNIT) tablet, Take 400 Units by mouth Daily., Disp: , Rfl:   •  Cyanocobalamin (B-12) 1000 MCG capsule, Take 1 capsule by mouth Daily., Disp: , Rfl:   •  DUPIXENT 300 MG/2ML solution prefilled syringe, Every 14 (Fourteen) Days. LAST DOSE July 4, 2021.  TAKING FOR ALLERGY RELATED SYMTOMS, Disp: , Rfl:   •  Flovent  MCG/ACT inhaler, Inhale 2 puffs Every Morning., Disp: , Rfl:   •  ibuprofen (ADVIL,MOTRIN) 800 MG tablet, Take 1 tablet by mouth Every 6 (Six) Hours As Needed for Mild Pain . (Patient taking differently: Take 800 mg by mouth Every 6 (Six) Hours As Needed for Mild Pain . PT HOLDING FOR SURGERY), Disp: 50 tablet, Rfl: 0  •  lansoprazole (PREVACID) 30 MG capsule, Take 30 mg by mouth Daily., Disp: , Rfl:   •  loratadine (Claritin) 10 MG tablet, Take 10 mg by mouth Daily., Disp: , Rfl:   •  Magnesium 250 MG tablet, Take 1 tablet by mouth Daily., Disp: , Rfl:   •  Menaquinone-7 (VITAMIN K2 PO), Take 1 tablet by mouth Every Morning., Disp: , Rfl:   •  montelukast (SINGULAIR) 10 MG tablet, Take 10 mg by mouth Every Morning., Disp: , Rfl:   •  VITAMIN A PO, Take 1 tablet by mouth Daily., Disp: , Rfl:   •  Zinc 50 MG capsule, Take 1 capsule by mouth Every Morning., Disp: , Rfl:   •  traMADol (ULTRAM) 50 MG tablet, Take 1 tablet by mouth Every 6 (Six) Hours As Needed for Moderate Pain ., Disp: 40 tablet, Rfl: 0    Current Facility-Administered Medications:   •   nitroglycerin (NITROSTAT) SL tablet 0.4 mg, 0.4 mg, Sublingual, Q5 Min PRN, Prashanth Moctezuma III, MD  •  sodium chloride 0.9 % flush 10 mL, 10 mL, Intravenous, PRN, Prashanth Moctezuma III, MD         No follow-ups on file.

## 2021-08-25 NOTE — PROGRESS NOTES
"Subjective   History of Present Illness: Nathan Burk is a 66 y.o. male is here today for follow-up new cervical xray's and after going to physical therapy. Mr. Burk is 6 weeks and 1 day out from having a C5C6 ACDF on 07-12-21.     Today, Mr. Moran reports that he has been going to physical therapy. He reports that he is doing well. He denies neck and arm pain.  He is very happy with the surgery and the physical therapy results having no residual problems.  He did spend the night in the emergency room earlier this week with chest pain and is going to embark upon a GI work-up after the cardiac work-up was negative.    While in the room and during my examination of the patient I wore a mask and eye protection.  I washed my hands before and after this patient encounter.  The patient was also wearing a mask.    History of Present Illness    The following portions of the patient's history were reviewed and updated as appropriate: allergies, current medications, past family history, past medical history, past social history, past surgical history and problem list.    Review of Systems   Respiratory: Negative for chest tightness and shortness of breath.    Cardiovascular: Negative for chest pain.   Musculoskeletal: Negative for neck pain.       Objective     Vitals:    08/26/21 0821   BP: 136/82   Temp: 98.2 °F (36.8 °C)   Weight: 97.1 kg (214 lb)   Height: 180.3 cm (71\")     Body mass index is 29.85 kg/m².      Physical Exam  Neurologic Exam    Physical Exam:    CONSTITUTIONAL:  appears well developed, well-nourished and in no acute distress.    NECK: the neck is supple and symmetric. The trachea is midline with no masses.  Anterior neck incision healed good range of motion of the neck without pain    PULMONARY: Respiratory effort is normal with no increased work of breathing or signs of respiratory distress.    CARDIOVASCULAR: Pedal pulses are +2/4 bilaterally. Examination of the extremities shows no edema or " varicosities.    MUSCULOSKELETAL: Gait normal    SKIN: The skin is warm, dry and intact.  Anterior neck incision healed    NEUROLOGIC:   Normal motor strength noted. Muscle bulk and tone are normal.  Sensory exam is normal to all modalities.  Reflexes diminished but symmetrical in the upper and lower extremities  Cortical function is intact and without deficits. Speech is normal.    PSYCHIATRIC: oriented to person, place and time. Patient's mood and affect are normal.      Assessment/Plan   Independent Review of Radiographic Studies:      I personally reviewed the images from the following studies.    Cervical radiographs done on August 13, 2021 at [facility] revealed postoperative changes with anterior cervical disc and fusion at C5-C6.    Medical Decision Making:      From the surgery perspective the healing has progressed to allow resumption of normal preoperative activity including work. We encouraged continuation of the therapy exercises long term. For now we will plan on follow up only as needed in the future and would be happy to re-evaluate at any time.     Return if symptoms worsen or fail to improve.    Diagnoses and all orders for this visit:    1. Status post neck surgery, follow-up exam (Primary)    2. Herniation of cervical intervertebral disc with radiculopathy             Zeus Robertson MD FACS [facility]  Neurological Surgery

## 2021-08-26 ENCOUNTER — OFFICE VISIT (OUTPATIENT)
Dept: NEUROSURGERY | Facility: CLINIC | Age: 66
End: 2021-08-26

## 2021-08-26 VITALS
TEMPERATURE: 98.2 F | WEIGHT: 214 LBS | SYSTOLIC BLOOD PRESSURE: 136 MMHG | DIASTOLIC BLOOD PRESSURE: 82 MMHG | BODY MASS INDEX: 29.96 KG/M2 | HEIGHT: 71 IN

## 2021-08-26 DIAGNOSIS — M50.10 HERNIATION OF CERVICAL INTERVERTEBRAL DISC WITH RADICULOPATHY: ICD-10-CM

## 2021-08-26 DIAGNOSIS — Z09 STATUS POST NECK SURGERY, FOLLOW-UP EXAM: Primary | ICD-10-CM

## 2021-08-26 PROCEDURE — 99024 POSTOP FOLLOW-UP VISIT: CPT | Performed by: NEUROLOGICAL SURGERY

## 2021-08-28 ENCOUNTER — TELEPHONE (OUTPATIENT)
Dept: FAMILY MEDICINE CLINIC | Facility: CLINIC | Age: 66
End: 2021-08-28

## 2021-08-28 NOTE — TELEPHONE ENCOUNTER
On call Monday evening back to 1115    Received a call from patient, he had some chest discomfort, earlier without nausea vomiting diaphoresis shortness of breath or other symptoms   I went to the emergency room he was therefore at bedtime, the wait was long he kept looking around the same persons with chills and fever wrapped up in blanket Covid everywhere, did not wait.  Left without work-up completed      He has an appointment with Dr. Purcell in the morning    Does not want to go back to the ER but he should call.    I really cannot answer what is going on with him, chest pain is always rule out something worrisome as is heart attack pulmonary emboli etc.  So that would be the best thing to do    He had increased substernal pain localized when he laid down,  Better when he sits up if he takes a deep breath and increases the discomfort he has no radiation nausea vomiting diaphoresis no squeezing of the drawl neck thoracic or abdomen.  Feels good otherwise he is without fever and coffee-ground emesis of black tarry stools    He should go back to the emergency room  He can take 30 cc of Mylanta and repeat x1  And upon the way his chest pain completely goes away and he feels much better is probably reasonable just keep his appointment with Dr. Purcell in the morning  However is not completely gone he should continue going to the emergency room for further evaluation and finish the work-up      This is a late entry 8/28/2021

## 2021-09-14 ENCOUNTER — ANESTHESIA (OUTPATIENT)
Dept: GASTROENTEROLOGY | Facility: HOSPITAL | Age: 66
End: 2021-09-14

## 2021-09-14 ENCOUNTER — ANESTHESIA EVENT (OUTPATIENT)
Dept: GASTROENTEROLOGY | Facility: HOSPITAL | Age: 66
End: 2021-09-14

## 2021-09-14 ENCOUNTER — HOSPITAL ENCOUNTER (OUTPATIENT)
Facility: HOSPITAL | Age: 66
Setting detail: HOSPITAL OUTPATIENT SURGERY
Discharge: HOME | End: 2021-09-14
Attending: INTERNAL MEDICINE | Admitting: INTERNAL MEDICINE

## 2021-09-14 VITALS
SYSTOLIC BLOOD PRESSURE: 124 MMHG | WEIGHT: 212 LBS | HEIGHT: 71 IN | OXYGEN SATURATION: 94 % | BODY MASS INDEX: 29.68 KG/M2 | DIASTOLIC BLOOD PRESSURE: 80 MMHG | HEART RATE: 73 BPM | RESPIRATION RATE: 16 BRPM

## 2021-09-14 PROCEDURE — G0105 COLORECTAL SCRN; HI RISK IND: HCPCS | Performed by: INTERNAL MEDICINE

## 2021-09-14 PROCEDURE — 25010000002 PROPOFOL 10 MG/ML EMULSION: Performed by: ANESTHESIOLOGY

## 2021-09-14 RX ORDER — SODIUM CHLORIDE, SODIUM LACTATE, POTASSIUM CHLORIDE, CALCIUM CHLORIDE 600; 310; 30; 20 MG/100ML; MG/100ML; MG/100ML; MG/100ML
1000 INJECTION, SOLUTION INTRAVENOUS CONTINUOUS
Status: DISCONTINUED | OUTPATIENT
Start: 2021-09-14 | End: 2021-09-14 | Stop reason: HOSPADM

## 2021-09-14 RX ORDER — LIDOCAINE HYDROCHLORIDE 20 MG/ML
INJECTION, SOLUTION INFILTRATION; PERINEURAL AS NEEDED
Status: DISCONTINUED | OUTPATIENT
Start: 2021-09-14 | End: 2021-09-14 | Stop reason: SURG

## 2021-09-14 RX ORDER — PROPOFOL 10 MG/ML
VIAL (ML) INTRAVENOUS CONTINUOUS PRN
Status: DISCONTINUED | OUTPATIENT
Start: 2021-09-14 | End: 2021-09-14 | Stop reason: SURG

## 2021-09-14 RX ADMIN — PROPOFOL 100 MCG/KG/MIN: 10 INJECTION, EMULSION INTRAVENOUS at 13:13

## 2021-09-14 RX ADMIN — SODIUM CHLORIDE, POTASSIUM CHLORIDE, SODIUM LACTATE AND CALCIUM CHLORIDE 1000 ML: 600; 310; 30; 20 INJECTION, SOLUTION INTRAVENOUS at 12:59

## 2021-09-14 RX ADMIN — LIDOCAINE HYDROCHLORIDE 60 MG: 20 INJECTION, SOLUTION INFILTRATION; PERINEURAL at 13:13

## 2021-09-14 NOTE — ANESTHESIA PREPROCEDURE EVALUATION
Anesthesia Evaluation                  Airway   Mallampati: III  TM distance: >3 FB  Neck ROM: limited  No difficulty expected  Dental - normal exam     Pulmonary - normal exam   (+) asthma,  Cardiovascular - normal exam    (+) hyperlipidemia,       Neuro/Psych  (+) numbness,     GI/Hepatic/Renal/Endo    (+)  GERD,  liver disease fatty liver disease,     Musculoskeletal     (+) neck pain,   Abdominal    Substance History      OB/GYN          Other                        Anesthesia Plan    ASA 3     intravenous induction     Anesthetic plan, all risks, benefits, and alternatives have been provided, discussed and informed consent has been obtained with: patient.

## 2021-09-14 NOTE — H&P
[facility]  Pre Procedure History & Physical    Chief Complaint:   Time for my colonoscopy    Subjective     HPI:   66 y.o. male who has a history of colon polyps.  His dad had colon cancer.  He last had a colonoscopy in December 2017.    Past Medical History:   Past Medical History:   Diagnosis Date   • Allergic    • Arthritis    • Asthma     ALLERGY INDUCED   • Cervical radiculopathy    • Gastroesophageal reflux disease without esophagitis 2/8/2018   • Hyperlipidemia    • NAFLD (nonalcoholic fatty liver disease) 11/13/2018   • Neck pain    • Osteoarthritis    • Skin cancer of arm, right        Family History:  Family History   Problem Relation Age of Onset   • Colon cancer Father    • Diabetes Mother    • Malig Hyperthermia Neg Hx        Social History:   reports that he quit smoking about 27 years ago. His smoking use included cigarettes. He has a 10.00 pack-year smoking history. He has never used smokeless tobacco. He reports current alcohol use. He reports that he does not use drugs.    Medications:   Facility-Administered Medications Prior to Admission   Medication Dose Route Frequency Provider Last Rate Last Admin   • nitroglycerin (NITROSTAT) SL tablet 0.4 mg  0.4 mg Sublingual Q5 Min PRN Prashanth Moctezuma III, MD       • [DISCONTINUED] sodium chloride 0.9 % flush 10 mL  10 mL Intravenous PRN Prashanth Moctezuma III, MD         Medications Prior to Admission   Medication Sig Dispense Refill Last Dose   • ANORO ELLIPTA 62.5-25 MCG/INH aerosol powder  inhaler Inhale 1 puff Daily.  6 9/14/2021 at Unknown time   • ascorbic acid (VITAMIN C) 1000 MG tablet Take 1,000 mg by mouth Daily.   9/13/2021 at Unknown time   • aspirin 81 MG EC tablet Take 1 tablet by mouth Daily. PT HOLDING FOR SURGERY (Patient taking differently: Take 81 mg by mouth Daily.)   9/14/2021 at Unknown time   • atorvastatin (LIPITOR) 40 MG tablet TAKE 1 TABLET BY MOUTH EVERY DAY (Patient taking differently: Take 40 mg by mouth  Daily.) 90 tablet 1 9/14/2021 at Unknown time   • azelastine (ASTELIN) 0.1 % nasal spray 1 spray into each nostril Daily. Use in each nostril as directed   9/14/2021 at Unknown time   • CALCIUM-MAGNESIUM-ZINC PO Take 1 tablet by mouth Daily.   9/13/2021 at Unknown time   • cholecalciferol (VITAMIN D3) 10 MCG (400 UNIT) tablet Take 400 Units by mouth Daily.   9/13/2021 at Unknown time   • Cyanocobalamin (B-12) 1000 MCG capsule Take 1 capsule by mouth Daily.   9/14/2021 at Unknown time   • DUPIXENT 300 MG/2ML solution prefilled syringe Every 14 (Fourteen) Days. LAST DOSE July 4, 2021.  TAKING FOR ALLERGY RELATED SYMTOMS   Past Week at Unknown time   • Flovent  MCG/ACT inhaler Inhale 2 puffs Every Morning.   9/14/2021 at Unknown time   • ibuprofen (ADVIL,MOTRIN) 800 MG tablet Take 1 tablet by mouth Every 6 (Six) Hours As Needed for Mild Pain . (Patient taking differently: Take 800 mg by mouth Every 6 (Six) Hours As Needed for Mild Pain . PT HOLDING FOR SURGERY) 50 tablet 0 Past Week at Unknown time   • lansoprazole (PREVACID) 30 MG capsule Take 30 mg by mouth Daily.   9/14/2021 at Unknown time   • Loratadine-Pseudoephedrine (CLARITIN-D 12 HOUR PO) Take 1 tablet by mouth Daily.   9/13/2021 at Unknown time   • Magnesium 250 MG tablet Take 1 tablet by mouth Daily.   9/13/2021 at Unknown time   • Menaquinone-7 (VITAMIN K2 PO) Take 1 tablet by mouth Every Morning.   9/14/2021 at Unknown time   • montelukast (SINGULAIR) 10 MG tablet Take 10 mg by mouth Every Morning.   9/14/2021 at Unknown time   • traMADol (ULTRAM) 50 MG tablet Take 1 tablet by mouth Every 6 (Six) Hours As Needed for Moderate Pain . 40 tablet 0 Past Month at Unknown time   • VITAMIN A PO Take 1 tablet by mouth Daily.   9/14/2021 at Unknown time   • Zinc 50 MG capsule Take 1 capsule by mouth Every Morning.   9/13/2021 at Unknown time       Allergies:  Patient has no known allergies.    ROS:    Pertinent items are noted in HPI     Objective     Blood  "pressure 135/91, pulse 76, resp. rate 16, height 180.3 cm (71\"), weight 96.2 kg (212 lb), SpO2 95 %.    Physical Exam   Constitutional: Pt is oriented to person, place, and time and well-developed, well-nourished, and in no distress.   HENT:   Mouth/Throat: Oropharynx is clear and moist.   Neck: Normal range of motion. Neck supple.   Cardiovascular: Normal rate, regular rhythm and normal heart sounds.    Pulmonary/Chest: Effort normal and breath sounds normal. No respiratory distress. No  wheezes.   Abdominal: Soft. Bowel sounds are normal.   Skin: Skin is warm and dry.   Psychiatric: Mood, memory, affect and judgment normal.     Assessment/Plan     Diagnosis:  66 y.o. male who has a history of colon polyps.  His dad had colon cancer.  He last had a colonoscopy in December 2017.    Anticipated Surgical Procedure:  Colonoscopy    The risks, benefits, and alternatives of this procedure have been discussed with the patient or the responsible party- the patient understands and agrees to proceed.    Rik Roldan M.D.  "

## 2021-09-14 NOTE — ANESTHESIA POSTPROCEDURE EVALUATION
Patient: Nathan Burk    Procedure Summary     Date: 09/14/21 Room / Location:  [facility] / [facility] ENDOSCOPY    Anesthesia Start: 1312 Anesthesia Stop: 1339    Procedure: COLONOSCOPY INTO CECUM AND T.I. (N/A ) Diagnosis:       Colon polyp      Family history of colon cancer      (Colon polyp [K63.5])      (Family history of colon cancer [Z80.0])    Surgeons: Rik Roldan MD Provider: Julieta Riddle MD    Anesthesia Type: Not recorded ASA Status: 3          Anesthesia Type: No value filed.    Vitals  No vitals data found for the desired time range.          Post Anesthesia Care and Evaluation    Patient location during evaluation: bedside  Patient participation: complete - patient participated  Level of consciousness: awake  Pain management: adequate  Airway patency: patent  Anesthetic complications: No anesthetic complications    Cardiovascular status: acceptable  Respiratory status: acceptable  Hydration status: acceptable

## 2021-09-14 NOTE — DISCHARGE INSTRUCTIONS
For the next 24 hours patient needs to be with a responsible adult.    For 24 hours DO NOT drive, operate machinery, appliances, drink alcohol, make important decisions or sign legal documents.    Start with a light or bland diet if you are feeling sick to your stomach otherwise advance to regular diet as tolerated.    Follow recommendations on procedure report if provided by your doctor.    Call Dr Roldan for problems [phone]    Problems may include but not limited to: large amounts of bleeding, trouble breathing, repeated vomiting, severe unrelieved pain, fever or chills.

## 2021-10-05 ENCOUNTER — OFFICE VISIT (OUTPATIENT)
Dept: GASTROENTEROLOGY | Facility: CLINIC | Age: 66
End: 2021-10-05

## 2021-10-05 VITALS — WEIGHT: 212.2 LBS | TEMPERATURE: 96.2 F | HEIGHT: 71 IN | BODY MASS INDEX: 29.71 KG/M2

## 2021-10-05 DIAGNOSIS — K21.9 GASTROESOPHAGEAL REFLUX DISEASE WITHOUT ESOPHAGITIS: Primary | ICD-10-CM

## 2021-10-05 DIAGNOSIS — K76.0 NAFLD (NONALCOHOLIC FATTY LIVER DISEASE): ICD-10-CM

## 2021-10-05 DIAGNOSIS — Z80.0 FH: COLON CANCER: ICD-10-CM

## 2021-10-05 DIAGNOSIS — D12.6 ADENOMATOUS POLYP OF COLON, UNSPECIFIED PART OF COLON: ICD-10-CM

## 2021-10-05 PROCEDURE — 99214 OFFICE O/P EST MOD 30 MIN: CPT | Performed by: NURSE PRACTITIONER

## 2021-10-05 NOTE — PROGRESS NOTES
Chief Complaint   Patient presents with   • Follow-up     from scopes   • Heartburn       Nathan Burk is a  66 y.o. male here for a follow up visit for GERD.    HPI  66-year-old male presents today for follow-up visit for GERD.  He is a patient of Dr. Roldan.  He was last seen for screening colonoscopy on 9/14/2021.  He is new to me today.  His colonoscopy did show diverticulosis and some nonbleeding internal hemorrhoids.  No specimens were collected.  He has a history of adenomatous colon polyps and a family history of colon cancer with his father.  He tells me about 2 weeks before his scheduled colonoscopy he had a few episodes of severe chest pain that scared him enough that sent him to the ER twice worried about a heart attack.  Cardiac work-up was negative.  He did try drinking some Mylanta on the advice from an on-call doctor with his PCP office and that gave him no relief in his symptoms.  He ended up following up with a cardiologist and getting a stress test and other work-up which was unrevealing.  On his own he decided to cut out all of the GERD trigger foods and drinks including hot sauce, orange juice, oranges and jalapeno peppers.  He tells me since the end of August he has not had 1 episode of that chest pain since.  He does report his bowels are moving well.  He tells me his reflux seems to be well controlled on Prevacid 30 mg daily.  He denies any dysphagia, nausea and vomiting, diarrhea, constipation, rectal bleeding or melena.  He admits his appetite is good and his weight is stable.  He also has history of fatty liver disease and his most recent LFTs were completely normal.  Past Medical History:   Diagnosis Date   • Allergic    • Arthritis    • Asthma     ALLERGY INDUCED   • Cervical radiculopathy    • Gastroesophageal reflux disease without esophagitis 2/8/2018   • Hyperlipidemia    • NAFLD (nonalcoholic fatty liver disease) 11/13/2018   • Neck pain    • Osteoarthritis    • Skin cancer of  arm, right        Past Surgical History:   Procedure Laterality Date   • ANTERIOR CERVICAL DISCECTOMY W/ FUSION Bilateral 2021    Procedure: Anterior cervical discectomy cervical 5 cervical 6 with allograft (cadaver) bone fusion and titanium instrumentation;  Surgeon: Zeus Robertson MD;  Location: [facility] MAIN OR;  Service: Neurosurgery;  Laterality: Bilateral;   • COLONOSCOPY N/A 2017    Procedure: COLONOSCOPY to cecum and t.i.;  Surgeon: Rik Roldan MD;  Location: [facility] ENDOSCOPY;  Service:    • COLONOSCOPY N/A 2021    Procedure: COLONOSCOPY INTO CECUM AND T.I.;  Surgeon: Rik Roldan MD;  Location: [facility] ENDOSCOPY;  Service: Gastroenterology;  Laterality: N/A;  PRE- FAMILY HISTORY OF COLON CANCER, PERSONAL HISTORY OF COLON POLYPS  POST- DIVERTICULOSIS, INTERNAL HEMORRHOIDS   • FRACTURE SURGERY      left foot   • HERNIA REPAIR     • KNEE MENISCAL REPAIR Right 2018   • KNEE SURGERY  2018    right knee   • SINUS SURGERY     • TONSILLECTOMY     • TOTAL KNEE ARTHROPLASTY Bilateral    • VASECTOMY         Scheduled Meds:     Continuous Infusions:     PRN Meds:.•  nitroglycerin    No Known Allergies    Social History     Socioeconomic History   • Marital status:      Spouse name: Not on file   • Number of children: Not on file   • Years of education: Not on file   • Highest education level: Not on file   Tobacco Use   • Smoking status: Former Smoker     Packs/day: 0.50     Years: 20.00     Pack years: 10.00     Types: Cigarettes     Quit date:      Years since quittin.7   • Smokeless tobacco: Never Used   Vaping Use   • Vaping Use: Never used   Substance and Sexual Activity   • Alcohol use: Yes     Comment: social 1-2 PER WEEK   • Drug use: No   • Sexual activity: Defer       Family History   Problem Relation Age of Onset   • Colon cancer Father    • Diabetes Mother    • Malig Hyperthermia Neg Hx        Review of Systems   Constitutional: Negative for appetite change,  chills, diaphoresis, fatigue, fever and unexpected weight change.   HENT: Negative for nosebleeds, postnasal drip, sore throat, trouble swallowing and voice change.    Respiratory: Negative for cough, choking, chest tightness, shortness of breath and wheezing.    Cardiovascular: Negative for chest pain, palpitations and leg swelling.   Gastrointestinal: Negative for abdominal distention, abdominal pain, anal bleeding, blood in stool, constipation, diarrhea, nausea, rectal pain and vomiting.   Endocrine: Negative for polydipsia, polyphagia and polyuria.   Musculoskeletal: Negative for gait problem.   Skin: Negative for rash and wound.   Allergic/Immunologic: Negative for food allergies.   Neurological: Negative for dizziness, speech difficulty and light-headedness.   Psychiatric/Behavioral: Negative for confusion, self-injury, sleep disturbance and suicidal ideas.       Vitals:    10/05/21 0951   Temp: 96.2 °F (35.7 °C)       Physical Exam  Constitutional:       General: He is not in acute distress.     Appearance: He is well-developed. He is not ill-appearing.   HENT:      Head: Normocephalic.   Eyes:      Pupils: Pupils are equal, round, and reactive to light.   Cardiovascular:      Rate and Rhythm: Normal rate and regular rhythm.      Heart sounds: Normal heart sounds.   Pulmonary:      Effort: Pulmonary effort is normal.      Breath sounds: Normal breath sounds.   Abdominal:      General: Bowel sounds are normal. There is no distension.      Palpations: Abdomen is soft. There is no mass.      Tenderness: There is no abdominal tenderness. There is no guarding or rebound.      Hernia: No hernia is present.   Musculoskeletal:         General: Normal range of motion.   Skin:     General: Skin is warm and dry.   Neurological:      Mental Status: He is alert and oriented to person, place, and time.   Psychiatric:         Speech: Speech normal.         Behavior: Behavior normal.         Judgment: Judgment normal.          No radiology results for the last 7 days     Diagnoses and all orders for this visit:    1. Gastroesophageal reflux disease without esophagitis (Primary)    2. NAFLD (nonalcoholic fatty liver disease)    3. Adenomatous polyp of colon, unspecified part of colon  Overview:  Added automatically from request for surgery [id number]      4. FH: colon cancer  Overview:  Added automatically from request for surgery [id number]       Reviewed most recent labs and imaging results today.  Reviewed colonoscopy results with him today.  Sounds like his episodes could have been gastritis/GERD related.  Cardiac work-up was negative.  Continue Prevacid 30 mg daily.  Continue GERD precautions.  Continue to avoid all GERD trigger foods and drinks.  Overall he seems to be doing much better.  He has not had any more episodes of chest pain.  I did advise the patient if he has 1 more episode we will consider EGD with Dr. Roldan for further evaluation.  Would also increase the Prevacid to twice daily at that time.  But for now he seems to be doing very well.  Next screening colonoscopy will be due in 3 years.  Most recent LFTs were normal.  Patient to call the office with any issues.  Patient to follow-up as needed.  Patient is agreeable to the plan.

## 2021-10-13 ENCOUNTER — OFFICE VISIT (OUTPATIENT)
Dept: FAMILY MEDICINE CLINIC | Facility: CLINIC | Age: 66
End: 2021-10-13

## 2021-10-13 VITALS
BODY MASS INDEX: 29.4 KG/M2 | OXYGEN SATURATION: 94 % | HEART RATE: 84 BPM | TEMPERATURE: 97.7 F | DIASTOLIC BLOOD PRESSURE: 77 MMHG | HEIGHT: 71 IN | WEIGHT: 210 LBS | SYSTOLIC BLOOD PRESSURE: 122 MMHG

## 2021-10-13 DIAGNOSIS — Z00.00 MEDICARE WELCOME EXAM: Primary | ICD-10-CM

## 2021-10-13 DIAGNOSIS — E78.00 PURE HYPERCHOLESTEROLEMIA: ICD-10-CM

## 2021-10-13 PROCEDURE — 1170F FXNL STATUS ASSESSED: CPT | Performed by: FAMILY MEDICINE

## 2021-10-13 PROCEDURE — 90670 PCV13 VACCINE IM: CPT | Performed by: FAMILY MEDICINE

## 2021-10-13 PROCEDURE — G0009 ADMIN PNEUMOCOCCAL VACCINE: HCPCS | Performed by: FAMILY MEDICINE

## 2021-10-13 PROCEDURE — G0402 INITIAL PREVENTIVE EXAM: HCPCS | Performed by: FAMILY MEDICINE

## 2021-10-13 PROCEDURE — 1160F RVW MEDS BY RX/DR IN RCRD: CPT | Performed by: FAMILY MEDICINE

## 2021-10-13 PROCEDURE — 99213 OFFICE O/P EST LOW 20 MIN: CPT | Performed by: FAMILY MEDICINE

## 2021-10-13 NOTE — PROGRESS NOTES
The ABCs of the Annual Wellness Visit  Subsequent Medicare Wellness Visit    Chief Complaint   Patient presents with   • Welcome To Medicare      Subjective    History of Present Illness:  Nathan Burk is a 66 y.o. male who presents for a Subsequent Medicare Wellness Visit.    The following portions of the patient's history were reviewed and   updated as appropriate: allergies, current medications, past family history, past medical history, past social history, past surgical history and problem list.    Compared to one year ago, the patient feels his physical   health is better.    Compared to one year ago, the patient feels his mental   health is the same.    Recent Hospitalizations:  This patient has had a [facility] admission record on file within the last 365 days.    Current Medical Providers:  Patient Care Team:  Bud Rush MD as PCP - General (Family Medicine)    Outpatient Medications Prior to Visit   Medication Sig Dispense Refill   • ANORO ELLIPTA 62.5-25 MCG/INH aerosol powder  inhaler Inhale 1 puff Daily.  6   • ascorbic acid (VITAMIN C) 1000 MG tablet Take 1,000 mg by mouth Daily.     • atorvastatin (LIPITOR) 40 MG tablet TAKE 1 TABLET BY MOUTH EVERY DAY (Patient taking differently: Take 40 mg by mouth Daily.) 90 tablet 1   • azelastine (ASTELIN) 0.1 % nasal spray 1 spray into each nostril Daily. Use in each nostril as directed     • CALCIUM-MAGNESIUM-ZINC PO Take 1 tablet by mouth Daily.     • cholecalciferol (VITAMIN D3) 10 MCG (400 UNIT) tablet Take 400 Units by mouth Daily.     • Cyanocobalamin (B-12) 1000 MCG capsule Take 1 capsule by mouth Daily.     • DUPIXENT 300 MG/2ML solution prefilled syringe Every 14 (Fourteen) Days. LAST DOSE July 4, 2021.  TAKING FOR ALLERGY RELATED SYMTOMS     • Flovent  MCG/ACT inhaler Inhale 2 puffs Every Morning.     • ibuprofen (ADVIL,MOTRIN) 800 MG tablet Take 1 tablet by mouth Every 6 (Six) Hours As Needed for Mild Pain . (Patient taking  differently: Take 800 mg by mouth Every 6 (Six) Hours As Needed for Mild Pain . PT HOLDING FOR SURGERY) 50 tablet 0   • lansoprazole (PREVACID) 30 MG capsule Take 30 mg by mouth Daily.     • Loratadine-Pseudoephedrine (CLARITIN-D 12 HOUR PO) Take 1 tablet by mouth Daily.     • Magnesium 250 MG tablet Take 1 tablet by mouth Daily.     • Menaquinone-7 (VITAMIN K2 PO) Take 1 tablet by mouth Every Morning.     • montelukast (SINGULAIR) 10 MG tablet Take 10 mg by mouth Every Morning.     • traMADol (ULTRAM) 50 MG tablet Take 1 tablet by mouth Every 6 (Six) Hours As Needed for Moderate Pain . 40 tablet 0   • VITAMIN A PO Take 1 tablet by mouth Daily.     • Zinc 50 MG capsule Take 1 capsule by mouth Every Morning.     • aspirin 81 MG EC tablet Take 1 tablet by mouth Daily. PT HOLDING FOR SURGERY (Patient taking differently: Take 81 mg by mouth Daily.)       Facility-Administered Medications Prior to Visit   Medication Dose Route Frequency Provider Last Rate Last Admin   • nitroglycerin (NITROSTAT) SL tablet 0.4 mg  0.4 mg Sublingual Q5 Min PRN Prashanth Moctezuma III, MD           Opioid medication/s are on active medication list.  and I have evaluated his active treatment plan and pain score trends (see table).  There were no vitals filed for this visit.  I have reviewed the chart for potential of high risk medication and harmful drug interactions in the elderly.            Aspirin is not on active medication list.  Aspirin use is not indicated based on review of current medical condition/s. Risk of harm outweighs potential benefits.  .    Patient Active Problem List   Diagnosis   • Colon polyps   • FH: colon cancer   • Pure hypercholesterolemia   • Chronic cough   • Gastroesophageal reflux disease without esophagitis   • Impaired fasting glucose   • NAFLD (nonalcoholic fatty liver disease)   • Colon polyp   • Family history of colon cancer   • Status post neck surgery, follow-up exam     Advance Care Planning  Advance  "Directive is not on file.  ACP discussion was held with the patient during this visit. Patient has an advance directive (not in EMR), copy requested.          Objective    Vitals:    10/13/21 1547   BP: 122/77   Pulse: 84   Temp: 97.7 °F (36.5 °C)   TempSrc: Temporal   SpO2: 94%   Weight: 95.3 kg (210 lb)   Height: 180.3 cm (70.98\")     BMI Readings from Last 1 Encounters:   10/13/21 29.30 kg/m²   BMI is above normal parameters. Recommendations include: exercise counseling    Does the patient have evidence of cognitive impairment? No    Physical Exam  Lab Results   Component Value Date     (H) 2021    CHLPL 173 2021    TRIG 139 2021    HDL 39 (L) 2021     (H) 2021    VLDL 25 2021    HGBA1C 5.70 (H) 2021            HEALTH RISK ASSESSMENT    Smoking Status:  Social History     Tobacco Use   Smoking Status Former Smoker   • Packs/day: 0.50   • Years: 20.00   • Pack years: 10.00   • Types: Cigarettes   • Start date: 1974   • Quit date:    • Years since quittin.8   Smokeless Tobacco Never Used     Alcohol Consumption:  Social History     Substance and Sexual Activity   Alcohol Use Yes   • Alcohol/week: 2.0 standard drinks   • Types: 2 Standard drinks or equivalent per week    Comment: social 1-2 PER WEEK     Fall Risk Screen:    DIRK Fall Risk Assessment was completed, and patient is at LOW risk for falls.Assessment completed on:10/13/2021    Depression Screening:  PHQ-2/PHQ-9 Depression Screening 10/13/2021   Little interest or pleasure in doing things 0   Feeling down, depressed, or hopeless 0   Total Score 0       Health Habits and Functional and Cognitive Screening:  Functional & Cognitive Status 10/13/2021   Do you have difficulty preparing food and eating? No   Do you have difficulty bathing yourself, getting dressed or grooming yourself? No   Do you have difficulty using the toilet? No   Do you have difficulty moving around from place to place? " No   Do you have trouble with steps or getting out of a bed or a chair? No   Current Diet Well Balanced Diet   Dental Exam Up to date   Eye Exam Not up to date   Exercise (times per week) 2 times per week   Current Exercises Include Walking   Do you need help using the phone?  No   Are you deaf or do you have serious difficulty hearing?  No   Do you need help with transportation? No   Do you need help shopping? No   Do you need help preparing meals?  No   Do you need help with housework?  No   Do you need help with laundry? No   Do you need help taking your medications? No   Do you need help managing money? No   Do you ever drive or ride in a car without wearing a seat belt? No   Have you felt unusual stress, anger or loneliness in the last month? No   Who do you live with? Spouse   If you need help, do you have trouble finding someone available to you? No   Have you been bothered in the last four weeks by sexual problems? No   Do you have difficulty concentrating, remembering or making decisions? No       Age-appropriate Screening Schedule:  Refer to the list below for future screening recommendations based on patient's age, sex and/or medical conditions. Orders for these recommended tests are listed in the plan section. The patient has been provided with a written plan.    Health Maintenance   Topic Date Due   • ZOSTER VACCINE (1 of 2) Never done   • LIPID PANEL  09/30/2022   • TDAP/TD VACCINES (2 - Td or Tdap) 02/08/2028   • INFLUENZA VACCINE  Completed              Assessment/Plan   CMS Preventative Services Quick Reference  Risk Factors Identified During Encounter  Immunizations Discussed/Encouraged (specific Immunizations; Shingrix   Prevnar today    The above risks/problems have been discussed with the patient.  Follow up actions/plans if indicated are seen below in the Assessment/Plan Section.  Pertinent information has been shared with the patient in the After Visit Summary.    Diagnoses and all orders for  this visit:    1. Medicare welcome exam (Primary)    2. Pure hypercholesterolemia    Other orders  -     Pneumococcal Conjugate Vaccine 13-Valent All        Follow Up:   No follow-ups on file.     An After Visit Summary and PPPS were made available to the patient.        \plain

## 2021-10-25 ENCOUNTER — TELEPHONE (OUTPATIENT)
Dept: NEUROSURGERY | Facility: CLINIC | Age: 66
End: 2021-10-25

## 2021-10-25 ENCOUNTER — TELEPHONE (OUTPATIENT)
Dept: GASTROENTEROLOGY | Facility: CLINIC | Age: 66
End: 2021-10-25

## 2021-10-25 NOTE — TELEPHONE ENCOUNTER
Called pt and pt reports that he had sent the message to the wrong MD office, he meant to send this to surgeon's office. Pt reports that he did give them a call.

## 2021-10-25 NOTE — TELEPHONE ENCOUNTER
----- Message from Nathan Burk sent at 10/25/2021 11:10 AM EDT -----  Regarding: Antibiotics   Good morning, it’s Carlos Alberto Burk with a question. I’m seeing my dentist tomorrow and since it’s only been about 3 months since my surgery, I wanted to know if I need to take an antibiotic prior to the appointment, (like I did after my two knee replacements). If so, can you please call it in?    My pharmacy is [facility] [place], [phone]    Thank you

## 2021-10-25 NOTE — TELEPHONE ENCOUNTER
Pt had a C5/6 ACDF on 7/12/21 with Dr Robertson.  Does he need ABX before dental procedure?  Is having dental work tomorrow.   If so, call [facility] [phone].  Pt [phone]

## 2021-11-01 ENCOUNTER — TELEPHONE (OUTPATIENT)
Dept: FAMILY MEDICINE CLINIC | Facility: CLINIC | Age: 66
End: 2021-11-01

## 2021-11-01 RX ORDER — ATORVASTATIN CALCIUM 40 MG/1
TABLET, FILM COATED ORAL
Qty: 90 TABLET | Refills: 1 | Status: SHIPPED | OUTPATIENT
Start: 2021-11-01 | End: 2022-05-02

## 2021-11-01 NOTE — TELEPHONE ENCOUNTER
Rx Refill Note  Requested Prescriptions     Pending Prescriptions Disp Refills   • atorvastatin (LIPITOR) 40 MG tablet [Pharmacy Med Name: ATORVASTATIN 40 MG TABLET] 90 tablet 1     Sig: TAKE 1 TABLET BY MOUTH EVERY DAY      Last office visit with prescribing clinician: 10/13/2021      Next office visit with prescribing clinician: 10/13/2022            Elmira Jimenez MA  11/01/21, 09:57 EDT

## 2022-01-31 NOTE — PROGRESS NOTES
"Chief Complaint  Welcome To Medicare    Subjective          Nathan Burk presents to [facility]  History of Present Illness     Hyperlipidemia.  He continues atorvastatin 40 mg a day.  His lipid panel is overall favorable.  He is lost about 30 pounds in the last year.  His fasting glucose is much improved as is his A1c.  There is no longer any progression to diabetes type 2.  He otherwise is doing well.  He is followed by a allergy specialist for his asthma which has been stable.  He had a work-up at a cardiologist which is negative for probable coronary vascular disease.  The atypical chest pain has resolved since he stopped eating spicy food.  Thought to be esophagitis.    Objective   Vital Signs:   /77   Pulse 84   Temp 97.7 °F (36.5 °C) (Temporal)   Ht 180.3 cm (70.98\")   Wt 95.3 kg (210 lb)   SpO2 94%   BMI 29.30 kg/m²     Physical Exam  Constitutional:       Appearance: Normal appearance.   HENT:      Head: Atraumatic.   Eyes:      Conjunctiva/sclera: Conjunctivae normal.   Neck:      Thyroid: No thyroid mass, thyromegaly or thyroid tenderness.   Cardiovascular:      Rate and Rhythm: Normal rate and regular rhythm.      Pulses: Normal pulses.      Heart sounds: Normal heart sounds.   Pulmonary:      Effort: Pulmonary effort is normal.      Breath sounds: Normal breath sounds.   Abdominal:      General: Abdomen is flat. There is no distension.      Palpations: Abdomen is soft. There is no mass.      Tenderness: There is no abdominal tenderness.      Hernia: No hernia is present.   Musculoskeletal:         General: Normal range of motion.      Cervical back: Normal range of motion and neck supple. No muscular tenderness.   Lymphadenopathy:      Cervical: No cervical adenopathy.   Skin:     General: Skin is warm and dry.      Findings: No rash.   Neurological:      General: No focal deficit present.      Mental Status: He is alert and oriented to person, place, and " [address] [place] [address]  [phone], option 2, then option 1    Patient Education        Learning About Cutting Calories  How do calories affect your weight? Food gives your body energy. Energy from the food you eat is measured in calories. This energy keeps your heart beating, your brain active, and your muscles working. Your body needs a certain number of calories each day. After your body uses the calories it needs, it stores extra calories as fat. To lose weight safely, you have to eat fewer calories while eating in a healthy way. How many calories do you need each day? The more active you are, the more calories you need. When you are less active, you need fewer calories. How many calories you need each day also depends on several things, including your age and whether you are male or female. Here are some general guidelines for adults:  · Less active women and older adults need 1,600 to 2,000 calories each day. · Active women and less active men need 2,000 to 2,400 calories each day. · Active men need 2,400 to 3,000 calories each day. How can you cut calories and eat healthy meals? Whole grains, vegetables and fruits, and dried beans are good lower-calorie foods. They give you lots of nutrients and fiber. And they fill you up. Sweets, energy drinks, and soda pop are high in calories. They give you few nutrients and no fiber. Try to limit soda pop, fruit juice, and energy drinks. Drink water instead. Some fats can be part of a healthy diet. But cutting back on fats from highly processed foods like fast foods and many snack foods is a good way to lower the calories in your diet. Also, use smaller amounts of fats like butter, margarine, salad dressing, and mayonnaise. Add fresh garlic, lemon, or herbs to your meals to add flavor without adding fat. Meats and dairy products can be a big source of hidden fats. Try to choose lean or low-fat versions of these products.   Fat-free cookies, candies, chips, and frozen treats can still be high in sugar and calories. Some fat-free foods have more calories than regular ones. Eat fat-free treats in moderation, as you would other foods. If your favorite foods are high in fat, salt, sugar, or calories, limit how often you eat them. Eat smaller servings, or look for healthy substitutes. Fill up on fruits, vegetables, and whole grains. Eating at home  · Use meat as a side dish instead of as the main part of your meal.  · Try main dishes that use whole wheat pasta, brown rice, dried beans, or vegetables. · Find ways to cook with little or no fat, such as broiling, steaming, or grilling. · Use cooking spray instead of oil. If you use oil, use a monounsaturated oil, such as canola or olive oil. · Trim fat from meats before you cook them. · Drain off fat after you brown the meat or while you roast it. · Chill soups and stews after you cook them. Then skim the fat off the top after it hardens. Eating out  · Order foods that are broiled or poached rather than fried or breaded. · Cut back on the amount of butter or margarine that you use on bread. · Order sauces, gravies, and salad dressings on the side, and use only a little. · When you order pasta, choose tomato sauce rather than cream sauce. · Ask for salsa with your baked potato instead of sour cream, butter, cheese, or bravo. · Order meals in a small size instead of upgrading to a large. · Share an entree, or take part of your food home to eat as another meal.  · Share appetizers and desserts. Where can you learn more? Go to https://Toppr.healthBirthday Gorilla. org and sign in to your [employer] account. Enter G416 in the Social Tables box to learn more about \"Learning About Cutting Calories. \"     If you do not have an account, please click on the \"Sign Up Now\" link. Current as of: September 8, 2021               Content Version: 13.1  © 1215-2082 Healthwise, Incorporated.    Care instructions adapted under time.   Psychiatric:         Mood and Affect: Mood normal.         Behavior: Behavior normal.        Result Review :   The following data was reviewed by: Bud Rush MD on 10/13/2021:  Common labs    Common Labsle 7/9/21 7/9/21 8/23/21 9/30/21 9/30/21 9/30/21    1250 1250  0813 0813 0813   Glucose  102 (A)       Glucose     100 (A)    BUN  17   19    Creatinine  0.80   0.94    eGFR Non  Am  97   81    eGFR African Am     98    Sodium  140   141    Potassium  4.3   4.9    Chloride  105   103    Calcium  9.5   9.9    Total Protein     6.8    Albumin     4.80    Total Bilirubin     0.7    Alkaline Phosphatase     111    AST (SGOT)     23    ALT (SGPT)     22    WBC 9.26  9.18      Hemoglobin 15.0  15.2      Hematocrit 44.6  45.1      Platelets 233  257      Total Cholesterol      173   Triglycerides      139   HDL Cholesterol      39 (A)   LDL Cholesterol       109 (A)   Hemoglobin A1C    5.70 (A)     (A) Abnormal value       Comments are available for some flowsheets but are not being displayed.                     Assessment and Plan    Diagnoses and all orders for this visit:    1. Medicare welcome exam (Primary)    2. Pure hypercholesterolemia    Other orders  -     Pneumococcal Conjugate Vaccine 13-Valent All      Hyperlipidemia.  Continue atorvastatin.    Impaired fasting glucose.  A1c is nearly normalized.  There is been no progression of diabetes.  He will continue with his diet and exercise.  Continue the weight loss.  I will see him back in 1 year.  Sooner as needed.    Prostate cancer screening.  PSA was done earlier this year and was less than 1.  He has no change in  symptoms.    Colonoscopy up-to-date.      Follow Up   No follow-ups on file.  Patient was given instructions and counseling regarding his condition or for health maintenance advice. Please see specific information pulled into the AVS if appropriate.        license by [facility] ([facility]). If you have questions about a medical condition or this instruction, always ask your healthcare professional. [address] any warranty or liability for your use of this information. Patient Education        Learning About Obesity  What is obesity? Obesity means having an unhealthy amount of body fat. This puts your health in danger. It can lead to other health problems, such as type 2 diabetes and high blood pressure. How do you know if your weight is in the obesity range? To know if your weight is in the obesity range, your doctor looks at your body mass index (BMI) and waist size. BMI is a number that is calculated from your weight and your height. To figure out your BMI for yourself, you can use an online tool, such as http://www.stanley.com/ on the [employer] of [employer]. If your BMI is 30.0 or higher, it falls within the obesity range. Keep in mind that BMI and waist size are only guides. They are not tools to determine your ideal body weight. What causes obesity? When you take in more calories than you burn off, you gain weight. How you eat, how active you are, and other things affect how your body uses calories and whether you gain weight. If you have family members who have too much body fat, you may have inherited a tendency to gain weight. And your family also helps form your eating and lifestyle habits, which can lead to obesity. Also, our busy lives make it harder to plan and cook healthy meals. For many of us, it's easier to reach for prepared foods, go out to eat, or go to the drive-through. But these foods are often high in saturated fat and calories. Portions are often too large. What can you do to reach a healthy weight? Focus on health, not diets. Diets are hard to stay on and don't work in the long run.  It is very hard to stay with a diet that includes lots of big changes in your eating habits. Instead of a diet, focus on lifestyle changes that will improve your health and achieve the right balance of energy and calories. To lose weight, you need to burn more calories than you take in. You can do it by eating healthy foods in reasonable amounts and becoming more active, even a little bit every day. Making small changes over time can add up to a lot. Make a plan for change. Many people have found that naming their reasons for change and staying focused on their plan can make a big difference. Work with your doctor to create a plan that is right for you. · Ask yourself: Patton Dimmer are my personal, most powerful reasons for wanting this change? What will my life look like when I've made the change? \"  · Set your long-term goal. Make it specific, such as \"I will lose x pounds. \"  · Break your long-term goal into smaller, short-term goals. Make these small steps specific and within your reach, things you know you can do. These steps are what keep you going from day to day. Talk with your doctor about other weight-loss options. If you have a BMI in a certain range and have not been able to lose weight with diet and exercise, medicine or surgery may be an option for you. Before your doctor will prescribe medicines or surgery, he or she will probably want you to be more active and follow your healthy eating plan for a period of time. These habits are key lifelong changes for managing your weight, with or without other medical treatment. And these changes can help you avoid weight-related health problems. How can you stay on your plan for change? Be ready. Choose to start during a time when there are few events like holidays, social events, and high-stress periods. These events might trigger slip-ups. Decide on your first few steps. Most people have more success when they make small changes, one step at a time.  For example, you might switch a daily candy bar to a piece of fruit, walk 10 minutes more, or add more vegetables to a meal.  Line up your support people. Make sure you're not going to be alone as you make this change. Connect with people who understand how important it is to you. Ask family members and friends for help in keeping with your plan. And think about who could make it harder for you, and how to handle them. Try tracking. People who keep track of what they eat, feel, and do are better at losing weight. Try writing down things like:  · What and how much you eat. · How you feel before and after each meal.  · Details about each meal (like eating out or at home, eating alone, or with friends or family). · What you do to be active. Look and plan. As you track, look for patterns that you may want to change. Take note of:  · When you eat and whether you skip meals. · How often you eat out. · How many fruits and vegetables you eat. · When you eat beyond feeling full. · When and why you eat for reasons other than being hungry. When you stray from your plan, don't get upset. Figure out what made you slip up and how you can fix it. Can you take medicines or have surgery to lose weight? If you have a BMI in a certain range and have not been able to lose weight with diet and exercise, medicine or surgery may be an option for you. If you have a BMI of at least 30.0 (or a BMI of at least 27.0 and another health problem related to your weight), ask your doctor about weight-loss medicines. They work by making you feel less hungry, making you feel full more quickly, or changing how you digest fat. Medicines are used along with diet changes and more physical activity to help you make lasting changes. If you have a BMI of 40.0 or more (or a BMI of 35.0 or more and another health problem related to your weight), your doctor may talk with you about surgery.  Weight-loss surgery has risks, and you will need to work with your doctor to compare the risk of having obesity with the risks of surgery. With any option you choose, you will still need to eat a healthy diet and get regular exercise. Follow-up care is a key part of your treatment and safety. Be sure to make and go to all appointments, and call your doctor if you are having problems. It's also a good idea to know your test results and keep a list of the medicines you take. Where can you learn more? Go to https://BOKUpepiceweb.ObsEva. org and sign in to your [employer] account. Enter N111 in the [employer] box to learn more about \"Learning About Obesity. \"     If you do not have an account, please click on the \"Sign Up Now\" link. Current as of: March 17, 2021               Content Version: 13.1  © 7352-8772 Healthwise, Incorporated. Care instructions adapted under license by [facility] ([facility]). If you have questions about a medical condition or this instruction, always ask your healthcare professional. [place] [address] any warranty or liability for your use of this information.

## 2022-04-15 ENCOUNTER — OFFICE VISIT (OUTPATIENT)
Dept: FAMILY MEDICINE CLINIC | Facility: CLINIC | Age: 67
End: 2022-04-15

## 2022-04-15 VITALS
HEIGHT: 71 IN | HEART RATE: 68 BPM | DIASTOLIC BLOOD PRESSURE: 75 MMHG | OXYGEN SATURATION: 97 % | SYSTOLIC BLOOD PRESSURE: 118 MMHG | BODY MASS INDEX: 29.29 KG/M2 | TEMPERATURE: 97.3 F | WEIGHT: 209.2 LBS

## 2022-04-15 DIAGNOSIS — M54.31 SCIATICA OF RIGHT SIDE: Primary | ICD-10-CM

## 2022-04-15 PROBLEM — K76.0 NAFLD (NONALCOHOLIC FATTY LIVER DISEASE): Chronic | Status: ACTIVE | Noted: 2018-11-13

## 2022-04-15 PROBLEM — R73.01 IMPAIRED FASTING GLUCOSE: Chronic | Status: ACTIVE | Noted: 2018-05-07

## 2022-04-15 PROBLEM — K21.9 GASTROESOPHAGEAL REFLUX DISEASE WITHOUT ESOPHAGITIS: Chronic | Status: ACTIVE | Noted: 2018-02-08

## 2022-04-15 PROBLEM — E78.00 PURE HYPERCHOLESTEROLEMIA: Chronic | Status: ACTIVE | Noted: 2018-02-08

## 2022-04-15 PROCEDURE — 99213 OFFICE O/P EST LOW 20 MIN: CPT | Performed by: FAMILY MEDICINE

## 2022-04-15 NOTE — PROGRESS NOTES
"Answers for HPI/ROS submitted by the patient on 4/14/2022  What is the primary reason for your visit?: Other  Please describe your symptoms.: Pain in right leg  Have you had these symptoms before?: No  How long have you been having these symptoms?: Greater than 2 weeks    Chief Complaint  Back Pain (Right sided lwr back pain )    Subjective     {Problem List  Visit Diagnosis   Encounters  Notes  Medications  Labs  Result Review Imaging  Media :23}     Nathan Burk presents to [facility]  History of Present Illness     2 weeks ago patient had a very long drive to [place].  He got to his house there, and had sudden pain from his right buttocks down to his right calf.  It lasted for a number of minutes and was very painful could not walk.  And then over the next day or 2 it slowly improved and then he was able to golf without trouble.  He has had no trouble since.  On the drive back from [place] recently he change the way that he sat.  He has not had this pain before or since.  He has had no change in his urination.  No numbness in the leg.  No weakness in the leg.  No saddle anesthesia.  And he otherwise feels well.  PSA was normal about a year ago.  Objective   Vital Signs:   /75   Pulse 68   Temp 97.3 °F (36.3 °C) (Temporal)   Ht 180.3 cm (70.98\")   Wt 94.9 kg (209 lb 3.2 oz)   SpO2 97%   BMI 29.19 kg/m²            Physical Exam  Constitutional:       General: He is not in acute distress.     Appearance: He is not ill-appearing.   Cardiovascular:      Rate and Rhythm: Normal rate.   Pulmonary:      Effort: Pulmonary effort is normal.   Musculoskeletal:      Comments: Lumbar spine no pain to palpation.  Also no pain to percussion.  No SI joint discomfort.  No sciatic notch discomfort.  The trochanteric bursa is unremarkable.  Negative straight leg lift.  Strength is 5 out of 5 all major muscle groups in lower extremities.  Good internal and external rotation of " the right hip.  DTRs are +2/4 bilateral patellar and ankle reflexes.  The gait is normal.  No rash.        Result Review :                 Assessment and Plan    Diagnoses and all orders for this visit:    1. Sciatica of right side (Primary)      Acute sciatica.  Resolved.  Like related to the long car trip.  At this time there is no absolute indication for further evaluation.  Holding off x-ray or MRI.  He patient has been asymptomatic for almost 2 weeks now.  The pain returns or if other concerning symptoms develop he is going to let me know.  Otherwise I will see him back as scheduled.      Follow Up   No follow-ups on file.  Patient was given instructions and counseling regarding his condition or for health maintenance advice. Please see specific information pulled into the AVS if appropriate.

## 2022-05-02 RX ORDER — ATORVASTATIN CALCIUM 40 MG/1
TABLET, FILM COATED ORAL
Qty: 90 TABLET | Refills: 1 | Status: SHIPPED | OUTPATIENT
Start: 2022-05-02 | End: 2022-12-06

## 2022-05-02 NOTE — TELEPHONE ENCOUNTER
Rx Refill Note  Requested Prescriptions     Pending Prescriptions Disp Refills   • atorvastatin (LIPITOR) 40 MG tablet [Pharmacy Med Name: ATORVASTATIN 40 MG TABLET] 90 tablet 1     Sig: TAKE 1 TABLET BY MOUTH EVERY DAY      Last office visit with prescribing clinician: 4/15/2022      Next office visit with prescribing clinician: 10/13/2022            Naheed Luz  05/02/22, 13:10 EDT

## 2022-07-20 ENCOUNTER — OFFICE VISIT (OUTPATIENT)
Dept: FAMILY MEDICINE CLINIC | Facility: CLINIC | Age: 67
End: 2022-07-20

## 2022-07-20 VITALS
WEIGHT: 204 LBS | DIASTOLIC BLOOD PRESSURE: 79 MMHG | HEIGHT: 71 IN | BODY MASS INDEX: 28.56 KG/M2 | OXYGEN SATURATION: 96 % | SYSTOLIC BLOOD PRESSURE: 129 MMHG | TEMPERATURE: 97.7 F | HEART RATE: 67 BPM

## 2022-07-20 DIAGNOSIS — M54.31 SCIATICA OF RIGHT SIDE: Primary | ICD-10-CM

## 2022-07-20 PROCEDURE — 99213 OFFICE O/P EST LOW 20 MIN: CPT | Performed by: FAMILY MEDICINE

## 2022-07-20 NOTE — PROGRESS NOTES
"Chief Complaint  Leg Pain (Shooting pain going down right leg starting at hip )    Subjective        Nathan Burk presents to [facility]  History of Present Illness     Right-sided intermittent shooting pain from right buttocks down to her right foot.  Very brief.  Nuisance level.  Not severe pain.  Typically lasts just a moment.  Typically when bending over a certain way to  something.  Does not bother him otherwise.  Does not bother his golf again.  He is otherwise active.  He walks.  He has had no fevers or chills or abdominal pain.  No numbness or weakness in lower extremities.  No saddle anesthesia.  No urinary changes.  He thinks that the fact of the pain bothers him more than the pain itself.    Objective   Vital Signs:  /79   Pulse 67   Temp 97.7 °F (36.5 °C) (Temporal)   Ht 180.3 cm (71\")   Wt 92.5 kg (204 lb)   SpO2 96%   BMI 28.45 kg/m²   Estimated body mass index is 28.45 kg/m² as calculated from the following:    Height as of this encounter: 180.3 cm (71\").    Weight as of this encounter: 92.5 kg (204 lb).          Physical Exam  Vitals and nursing note reviewed.   Constitutional:       General: He is not in acute distress.     Appearance: He is well-developed.   Cardiovascular:      Rate and Rhythm: Normal rate.   Pulmonary:      Effort: Pulmonary effort is normal.   Musculoskeletal:      Cervical back: Normal range of motion.      Comments: Excellent full range of motion of the right hip without pain.  There is no pain at the right trochanteric bursa.  No sciatic notch discomfort.  No pain with ambulation.  Negative straight leg lift.  Strength is good.   Skin:     General: Skin is warm and dry.   Psychiatric:         Mood and Affect: Mood normal.        Result Review :                Assessment and Plan   Diagnoses and all orders for this visit:    1. Sciatica of right side (Primary)  -     Ambulatory Referral to Physical Therapy Evaluate and " treat    Mild intermittent sciatica with certain movements.  At this point no definite indication for MRI or medication..  I am recommending physical therapy.  If the symptoms are getting much worse, will need further investigation.         Follow Up   No follow-ups on file.  Patient was given instructions and counseling regarding his condition or for health maintenance advice. Please see specific information pulled into the AVS if appropriate.

## 2022-08-04 ENCOUNTER — OFFICE VISIT (OUTPATIENT)
Dept: FAMILY MEDICINE CLINIC | Facility: CLINIC | Age: 67
End: 2022-08-04

## 2022-08-04 VITALS
HEIGHT: 71 IN | BODY MASS INDEX: 28.35 KG/M2 | SYSTOLIC BLOOD PRESSURE: 124 MMHG | WEIGHT: 202.5 LBS | OXYGEN SATURATION: 97 % | HEART RATE: 79 BPM | TEMPERATURE: 97.1 F | DIASTOLIC BLOOD PRESSURE: 82 MMHG

## 2022-08-04 DIAGNOSIS — R73.01 IMPAIRED FASTING GLUCOSE: ICD-10-CM

## 2022-08-04 DIAGNOSIS — M54.2 NECK PAIN: Primary | ICD-10-CM

## 2022-08-04 DIAGNOSIS — E78.00 PURE HYPERCHOLESTEROLEMIA: ICD-10-CM

## 2022-08-04 DIAGNOSIS — Z12.5 SCREENING PSA (PROSTATE SPECIFIC ANTIGEN): ICD-10-CM

## 2022-08-04 PROCEDURE — 99213 OFFICE O/P EST LOW 20 MIN: CPT | Performed by: FAMILY MEDICINE

## 2022-08-04 NOTE — PROGRESS NOTES
"Chief Complaint  Neck Pain (X 2weeks feels like a stiff neck ) and Back Pain    Subjective        Nathan Burk presents to [facility]  History of Present Illness     Neck pain.  2 weeks.  Left side.  Feels a little stiff.  No trouble with flexion and extension of when he turns from side to side hurts and asked a little stiff.  No radiculopathy.  Pain is moderate.  Does not keep him awake at night.  He is able to do his other activities.  I brought him into the office because I had just seen him for some back pain which he states is getting better.  He has an appointment coming up next week with physical therapy.  He has taken some ibuprofen and Advil.  He had cervical spine surgery about a year ago.  He states that pain was different.  He has no radiculopathy now.  And the pain is less intense.  Last PSA was a year and a half ago which was normal.  He otherwise feels well.  No fevers.  No rashes.  He does not feel ill.    Objective   Vital Signs:  /82   Pulse 79   Temp 97.1 °F (36.2 °C) (Temporal)   Ht 180.3 cm (71\")   Wt 91.9 kg (202 lb 8 oz)   SpO2 97%   BMI 28.24 kg/m²   Estimated body mass index is 28.24 kg/m² as calculated from the following:    Height as of this encounter: 180.3 cm (71\").    Weight as of this encounter: 91.9 kg (202 lb 8 oz).          Physical Exam  Constitutional:       Appearance: Normal appearance.   Neck:      Comments: Neck reveals full extension and flexion without discomfort.  With lateral movements he has some decreased range of motion and what appears to be moderate pain.  He has no midline cervical spine pain to palpation.  No bony pain to palpation.  He has some tenderness to palpation of the left inferior paracervical muscles.  Possibly the trapezius muscle.  There is no head neck adenopathy or mass.  No rash seen.  Neurological:      Mental Status: He is alert.        Result Review :                Assessment and Plan   Diagnoses and " all orders for this visit:    1. Neck pain (Primary)  -     CBC & Differential    2. Impaired fasting glucose  -     Hemoglobin A1c    3. Pure hypercholesterolemia  -     CBC & Differential  -     Comprehensive Metabolic Panel  -     Lipid Panel    4. Screening PSA (prostate specific antigen)  -     PSA Screen      Musculoskeletal neck pain.  The most likely diagnosis.  I want him to continue the over-the-counter Advil.  Heating pad may help.  I counseled the patient that I want this 100% gone in 1 month from today.  If not he is going to let me know, may then need further investigation and imaging.  I want him to keep the physical therapy appointment for next week.  I am rechecking above lab work and I will see him back for his routine scheduled appointment in October.  In particular, repeating PSA today.  It was normal a year and a half ago.         Follow Up   No follow-ups on file.  Patient was given instructions and counseling regarding his condition or for health maintenance advice. Please see specific information pulled into the AVS if appropriate.

## 2022-08-05 LAB
ALBUMIN SERPL-MCNC: 4.8 G/DL (ref 3.8–4.8)
ALBUMIN/GLOB SERPL: 2.4 {RATIO} (ref 1.2–2.2)
ALP SERPL-CCNC: 88 IU/L (ref 44–121)
ALT SERPL-CCNC: 29 IU/L (ref 0–44)
AST SERPL-CCNC: 32 IU/L (ref 0–40)
BASOPHILS # BLD AUTO: 0.1 X10E3/UL (ref 0–0.2)
BASOPHILS NFR BLD AUTO: 1 %
BILIRUB SERPL-MCNC: 0.7 MG/DL (ref 0–1.2)
BUN SERPL-MCNC: 17 MG/DL (ref 8–27)
BUN/CREAT SERPL: 17 (ref 10–24)
CALCIUM SERPL-MCNC: 9.8 MG/DL (ref 8.6–10.2)
CHLORIDE SERPL-SCNC: 99 MMOL/L (ref 96–106)
CHOLEST SERPL-MCNC: 192 MG/DL (ref 100–199)
CO2 SERPL-SCNC: 24 MMOL/L (ref 20–29)
CREAT SERPL-MCNC: 1 MG/DL (ref 0.76–1.27)
EGFRCR SERPLBLD CKD-EPI 2021: 83 ML/MIN/1.73
EOSINOPHIL # BLD AUTO: 0.2 X10E3/UL (ref 0–0.4)
EOSINOPHIL NFR BLD AUTO: 2 %
ERYTHROCYTE [DISTWIDTH] IN BLOOD BY AUTOMATED COUNT: 12.6 % (ref 11.6–15.4)
GLOBULIN SER CALC-MCNC: 2 G/DL (ref 1.5–4.5)
GLUCOSE SERPL-MCNC: 97 MG/DL (ref 65–99)
HBA1C MFR BLD: 5.7 % (ref 4.8–5.6)
HCT VFR BLD AUTO: 50 % (ref 37.5–51)
HDLC SERPL-MCNC: 39 MG/DL
HGB BLD-MCNC: 17.2 G/DL (ref 13–17.7)
IMM GRANULOCYTES # BLD AUTO: 0 X10E3/UL (ref 0–0.1)
IMM GRANULOCYTES NFR BLD AUTO: 0 %
LDLC SERPL CALC-MCNC: 116 MG/DL (ref 0–99)
LYMPHOCYTES # BLD AUTO: 1.6 X10E3/UL (ref 0.7–3.1)
LYMPHOCYTES NFR BLD AUTO: 23 %
MCH RBC QN AUTO: 31.4 PG (ref 26.6–33)
MCHC RBC AUTO-ENTMCNC: 34.4 G/DL (ref 31.5–35.7)
MCV RBC AUTO: 91 FL (ref 79–97)
MONOCYTES # BLD AUTO: 0.6 X10E3/UL (ref 0.1–0.9)
MONOCYTES NFR BLD AUTO: 9 %
NEUTROPHILS # BLD AUTO: 4.5 X10E3/UL (ref 1.4–7)
NEUTROPHILS NFR BLD AUTO: 65 %
PLATELET # BLD AUTO: 231 X10E3/UL (ref 150–450)
POTASSIUM SERPL-SCNC: 4.5 MMOL/L (ref 3.5–5.2)
PROT SERPL-MCNC: 6.8 G/DL (ref 6–8.5)
PSA SERPL-MCNC: 0.5 NG/ML (ref 0–4)
RBC # BLD AUTO: 5.48 X10E6/UL (ref 4.14–5.8)
SODIUM SERPL-SCNC: 139 MMOL/L (ref 134–144)
TRIGL SERPL-MCNC: 212 MG/DL (ref 0–149)
VLDLC SERPL CALC-MCNC: 37 MG/DL (ref 5–40)
WBC # BLD AUTO: 6.9 X10E3/UL (ref 3.4–10.8)

## 2022-08-11 ENCOUNTER — TREATMENT (OUTPATIENT)
Dept: PHYSICAL THERAPY | Facility: CLINIC | Age: 67
End: 2022-08-11

## 2022-08-11 DIAGNOSIS — M43.6 STIFFNESS OF NECK: Primary | ICD-10-CM

## 2022-08-11 DIAGNOSIS — M54.2 PAIN, NECK: ICD-10-CM

## 2022-08-11 DIAGNOSIS — M54.31 SCIATICA OF RIGHT SIDE: ICD-10-CM

## 2022-08-11 PROCEDURE — 97161 PT EVAL LOW COMPLEX 20 MIN: CPT | Performed by: PHYSICAL THERAPIST

## 2022-08-11 PROCEDURE — 97140 MANUAL THERAPY 1/> REGIONS: CPT | Performed by: PHYSICAL THERAPIST

## 2022-08-11 NOTE — PROGRESS NOTES
"Physical Therapy Initial Evaluation and Plan of Care      Patient: Nathan Burk   : 1955  Diagnosis/ICD-10 Code: Neck stiffness M43.6    Referring practitioner: Bud Rush MD    Subjective Evaluation    History of Present Illness  Mechanism of injury:  Pain started a year ago Oct. Thought I woke up with a \"crick\" in my neck. I had worked up to doing 5 sets of 65 pushups and not sure if that is what caused my neck issue or not  Yesterday lifted. Bench press, OH press. Worse last night. No shrugs  Sleeping Ok   Turning L painful . NSAIDs stopped 2 weeks ago.  Tried heat/ice.   Sciatic R only if improper bending  B TKA   Not too concerned about sciatica anymore.        Patient Occupation: Retired 3 years ago Quality of life: good    Pain  Quality: dull ache, discomfort and tight  Relieving factors: rest and change in position  Aggravating factors: movement, overhead activity and repetitive movement  Progression: improved    Social Support  Lives with: spouse    Hand dominance: right    Treatments  No previous or current treatments  Patient Goals  Patient goals for therapy: decreased pain, increased motion, independence with ADLs/IADLs and return to sport/leisure activities  Patient goal: golf not in 3-4 weeks. [place]           Objective          Static Posture     Scapulae  Left elevated and right protracted.    Postural Observations  Seated posture: fair        Active Range of Motion   Cervical/Thoracic Spine   Cervical    Flexion: 30 degrees   Extension: 35 degrees with pain  Left lateral flexion: 30 degrees   Right lateral flexion: 35 degrees   Left rotation: 35 degrees with pain  Right rotation: 45 degrees     Lumbar   Normal active range of motion  Flexion: Active lumbar flexion: pain R LE.     Passive Range of Motion     Additional Passive Range of Motion Details  Decreased seg mobility at L C3-6; R C1-2    Joint Play   Left Hip   Hypomobile in the anterior hip capsule " and lateral hip capsule.    Right Hip     Hypomobile in the anterior hip capsule and lateral hip capsule    Strength/Myotome Testing   Cervical Spine     Left   Normal strength    Right   Normal strength    Lumbar   Left   Normal strength    Right   Normal strength    Tests   Cervical     Left   Positive Spurling's sign.   Negative alar ligament integrity.     Lumbar     Left   Negative passive SLR and quadrant.     Right   Negative passive SLR and quadrant.     Functional Assessment     Comments  NDI 22%          Assessment & Plan     Assessment  Impairments: abnormal muscle tone, abnormal or restricted ROM, activity intolerance, lacks appropriate home exercise program and pain with function  Functional Limitations: uncomfortable because of pain  Assessment details: Pt is a good candidate for skilled PT intervention, sierra manual therapy for spinal joint mobility, alignment, postural restoration and core strengthening to restore functional AROM and strength to return to previous level of ADL's.  Prognosis: good    Goals  Plan Goals: Short Term Goals ( 3 weeks)  1. Pt to be independent with HEP  2. Pt to exhibit increased cervical segmental mobility to allow for increased AROM  3. Pt to demonstrate proper posture without verbal cues  4. Pt to report decreased pain with ADL's sierra driving    Long Term Goals (  6 weeks)  1. Pt to exhibit >50 degrees of cervical rotation B to allow for viewing traffic without pain or limitations  2. Pt to exhibit 5/5 strength for DCF to allow for lifting and more strenuous daily activities  3. Pt to score < 10% on NDI  4. Pt able to perform ADL's and recreational activities without pain     Plan  Therapy options: will be seen for skilled therapy services  Planned modality interventions: thermotherapy (hydrocollator packs), ultrasound, electrical stimulation/Russian stimulation and traction  Planned therapy interventions: body mechanics training, functional ROM exercises, joint  mobilization, home exercise program, manual therapy, neuromuscular re-education, postural training, soft tissue mobilization, spinal/joint mobilization, strengthening, stretching and therapeutic activities  Frequency: 2x week  Duration in weeks: 6  Treatment plan discussed with: patient        Manual Therapy:    15     mins  24564;  Therapeutic Exercise:    5     mins  85642;     Neuromuscular Asif:        mins  38775;    Therapeutic Activity:          mins  28631;     Gait Training:           mins  71275;     Ultrasound:          mins  47511;    Electrical Stimulation:         mins  14455 ( );  Dry Needling          mins self-pay    Timed Treatment:   20   mins   Total Treatment:     45   mins    PT SIGNATURE: MARGUERITE Wang License # [id number]  DATE TREATMENT INITIATED: 8/11/2022    Medicare Initial Certification  Certification Period: 11/9/2022  I certify that the therapy services are furnished while this patient is under my care.  The services outlined above are required by this patient, and will be reviewed every 90 days.     PHYSICIAN: Bud Rush MD      DATE:     Please sign and return via fax to [phone].. Thank you, [facility].

## 2022-08-15 NOTE — PATIENT INSTRUCTIONS
Pt was educated on the importance of their HEP and their current need for skilled physical therapy. Patients goals and potential limitations were discussed and pt is in agreement with current plan of care and treatment emphasis.

## 2022-08-23 ENCOUNTER — TREATMENT (OUTPATIENT)
Dept: PHYSICAL THERAPY | Facility: CLINIC | Age: 67
End: 2022-08-23

## 2022-08-23 DIAGNOSIS — M54.31 SCIATICA OF RIGHT SIDE: ICD-10-CM

## 2022-08-23 DIAGNOSIS — M43.6 STIFFNESS OF NECK: Primary | ICD-10-CM

## 2022-08-23 DIAGNOSIS — M54.2 PAIN, NECK: ICD-10-CM

## 2022-08-23 PROCEDURE — 97140 MANUAL THERAPY 1/> REGIONS: CPT | Performed by: PHYSICAL THERAPIST

## 2022-08-23 PROCEDURE — 97110 THERAPEUTIC EXERCISES: CPT | Performed by: PHYSICAL THERAPIST

## 2022-08-23 NOTE — PROGRESS NOTES
Physical Therapy Daily Progress Note        Subjective     Nathan Burk reports: Improving with towel stretch. Resumed pushups and weights and I think they help loosen me up    Objective   See Exercise, Manual, and Modality Logs for complete treatment.       Assessment/Plan  47 degrees LROT and 65 degrees RROT after treatment. Added thoracic open books to HEP    Progress per Plan of Care           Manual Therapy:  25       mins  32354;  Therapeutic Exercise: 15      mins  02979;     Neuromuscular Asif:       mins  84062;    Therapeutic Activity:         mins  56836;     Gait Training:         mins  66038;     Ultrasound:         mins  44299;    Electrical Stimulation:        mins  59559 ( );  Dry Needling         mins self-pay    Timed Treatment:   40   mins   Total Treatment:     40   mins    Maren Venegas, PT  Physical Therapist  [place] License # [id number]

## 2022-08-25 ENCOUNTER — TREATMENT (OUTPATIENT)
Dept: PHYSICAL THERAPY | Facility: CLINIC | Age: 67
End: 2022-08-25

## 2022-08-25 DIAGNOSIS — M43.6 STIFFNESS OF NECK: Primary | ICD-10-CM

## 2022-08-25 DIAGNOSIS — M54.2 PAIN, NECK: ICD-10-CM

## 2022-08-25 PROCEDURE — 97140 MANUAL THERAPY 1/> REGIONS: CPT | Performed by: PHYSICAL THERAPIST

## 2022-08-25 PROCEDURE — 97110 THERAPEUTIC EXERCISES: CPT | Performed by: PHYSICAL THERAPIST

## 2022-08-25 NOTE — PROGRESS NOTES
Physical Therapy Daily Progress Note        Subjective     Nathan Burk reports: Driving range without trouble. I do towel stretch for neck every morning and it really  helps    Objective   See Exercise, Manual, and Modality Logs for complete treatment.       Assessment/Plan  Improving segmental cervical mobility with less pain.    Progress per Plan of Care           Manual Therapy:  30       mins  37715;  Therapeutic Exercise: 8      mins  31442;     Neuromuscular Asif:       mins  21504;    Therapeutic Activity:         mins  72979;     Gait Training:         mins  64398;     Ultrasound:         mins  09164;    Electrical Stimulation:        mins  93513 ( );  Dry Needling         mins self-pay    Timed Treatment:   38   mins   Total Treatment:     38   mins    Maren Venegas, PT  Physical Therapist  [place] License # [id number]

## 2022-08-31 ENCOUNTER — TREATMENT (OUTPATIENT)
Dept: PHYSICAL THERAPY | Facility: CLINIC | Age: 67
End: 2022-08-31

## 2022-08-31 DIAGNOSIS — M43.6 STIFFNESS OF NECK: Primary | ICD-10-CM

## 2022-08-31 DIAGNOSIS — M54.31 SCIATICA OF RIGHT SIDE: ICD-10-CM

## 2022-08-31 DIAGNOSIS — M54.2 PAIN, NECK: ICD-10-CM

## 2022-08-31 PROCEDURE — 97140 MANUAL THERAPY 1/> REGIONS: CPT | Performed by: PHYSICAL THERAPIST

## 2022-08-31 PROCEDURE — 97110 THERAPEUTIC EXERCISES: CPT | Performed by: PHYSICAL THERAPIST

## 2022-08-31 NOTE — PROGRESS NOTES
Physical Therapy Daily Progress Note        Subjective     Nathan Burk reports: Not much pain. Just stiff in the morning. I would like to work on my sciatic issue now. January bent over to pick something up and had severe RLE pain and unable to put weight on R leg.9-10/10 pain. Now just 4-5 only if I bend back to lean over and     Objective   R AINN   severe tightness hip flexors, quads  (-) SLR R  Decreased RROT L1-5  Prone pressup WNL no pain    See Exercise, Manual, and Modality Logs for complete treatment.       Assessment/Plan  Added sciatic nerve tensioner seated to HEP. Unable to do nerve glide without pain. Improved after manual treatment today.    Progress per Plan of Care           Manual Therapy:  30       mins  60074;  Therapeutic Exercise: 8      mins  87648;     Neuromuscular Asif:       mins  22839;    Therapeutic Activity:         mins  51763;     Gait Training:         mins  58248;     Ultrasound:         mins  10280;    Electrical Stimulation:        mins  06266 ( );  Dry Needling         mins self-pay    Timed Treatment:   38   mins   Total Treatment:     38   mins    Maren Venegas PT  Physical Therapist  [place] License # [id number]

## 2022-09-02 ENCOUNTER — TREATMENT (OUTPATIENT)
Dept: PHYSICAL THERAPY | Facility: CLINIC | Age: 67
End: 2022-09-02

## 2022-09-02 DIAGNOSIS — M43.6 STIFFNESS OF NECK: ICD-10-CM

## 2022-09-02 DIAGNOSIS — M54.31 SCIATICA OF RIGHT SIDE: Primary | ICD-10-CM

## 2022-09-02 PROCEDURE — 97140 MANUAL THERAPY 1/> REGIONS: CPT | Performed by: PHYSICAL THERAPIST

## 2022-09-02 NOTE — PROGRESS NOTES
Physical Therapy Daily Progress Note        Subjective     Nathan Burk reports: Neck doing pretty good. Working on new exercise.     Objective   See Exercise, Manual, and Modality Logs for complete treatment.       Assessment/Plan  Pt initially had pain with LROT Cspine but improved to WFL after session. Still pain with bending over into RLE but not as intense    Progress per Plan of Care           Manual Therapy:  25       mins  92426;  Therapeutic Exercise: 5      mins  69492;     Neuromuscular Asif:       mins  54140;    Therapeutic Activity:         mins  05445;     Gait Training:         mins  51703;     Ultrasound:         mins  61350;    Electrical Stimulation:        mins  75740 ( );  Dry Needling         mins self-pay    Timed Treatment:   30   mins   Total Treatment:     30   mins    Maren Venegas, PT  Physical Therapist  [place] License # [id number]

## 2022-09-06 ENCOUNTER — TREATMENT (OUTPATIENT)
Dept: PHYSICAL THERAPY | Facility: CLINIC | Age: 67
End: 2022-09-06

## 2022-09-06 DIAGNOSIS — M43.6 STIFFNESS OF NECK: ICD-10-CM

## 2022-09-06 DIAGNOSIS — M54.31 SCIATICA OF RIGHT SIDE: Primary | ICD-10-CM

## 2022-09-06 PROCEDURE — 97140 MANUAL THERAPY 1/> REGIONS: CPT | Performed by: PHYSICAL THERAPIST

## 2022-09-06 NOTE — PROGRESS NOTES
Physical Therapy Daily Progress Note        Subjective     Nathan Burk reports: I played golf yesterday. 18 holes. Neck and back did well. R elbow pain pretty significant today. I was having pain with lifting weights but stopped. Surprised elbow painful after golf.    Objective   See Exercise, Manual, and Modality Logs for complete treatment.       Assessment/Plan  Advised NSAID/IE  after playing. DO not play 2 days [place] row. Much improved cervical segmental mobility. Instructed in supination AAROM and wrist flexor stretches    Progress per Plan of Care           Manual Therapy:  25       mins  54829;  Therapeutic Exercise: 6      mins  30816;     Neuromuscular Asif:       mins  22141;    Therapeutic Activity:         mins  52840;     Gait Training:         mins  22914;     Ultrasound:         mins  99379;    Electrical Stimulation:        mins  42029 ( );  Dry Needling         mins self-pay    Timed Treatment:   31   mins   Total Treatment:     31   mins    Maren Venegas, PT  Physical Therapist  [place] License # [id number]

## 2022-09-16 ENCOUNTER — TREATMENT (OUTPATIENT)
Dept: PHYSICAL THERAPY | Facility: CLINIC | Age: 67
End: 2022-09-16

## 2022-09-16 DIAGNOSIS — M54.2 PAIN, NECK: ICD-10-CM

## 2022-09-16 DIAGNOSIS — M54.31 SCIATICA OF RIGHT SIDE: Primary | ICD-10-CM

## 2022-09-16 DIAGNOSIS — M43.6 STIFFNESS OF NECK: ICD-10-CM

## 2022-09-16 PROCEDURE — 97140 MANUAL THERAPY 1/> REGIONS: CPT | Performed by: PHYSICAL THERAPIST

## 2022-09-16 NOTE — PROGRESS NOTES
Physical Therapy Daily Progress Note  Visit #7      Subjective     Nathan Burk reports: Neck doing well and R sciatic pain improving    Objective   See Exercise, Manual, and Modality Logs for complete treatment.       Assessment/Plan   Short Term Goals ( 3 weeks) MET  1. Pt to be independent with HEP  2. Pt to exhibit increased cervical segmental mobility to allow for increased AROM  3. Pt to demonstrate proper posture without verbal cues  4. Pt to report decreased pain with ADL's s[place] driving    Long Term Goals (  6 weeks)  1. Pt to exhibit >50 degrees of cervical rotation B to allow for viewing traffic without pain or limitations MET  2. Pt to exhibit 5/5 strength for DCF to allow for lifting and more strenuous daily activities MET  3. Pt to score < 10% on NDI NT  4. Pt able to perform ADL's and recreational activities without pain  NO    ABle to do sciatic nerve glide with less pain. Intermittent pain with lumbar forward bending    Progress per Plan of Care           Manual Therapy:  25       mins  85398;  Therapeutic Exercise: 5      mins  09584;     Neuromuscular Asif:       mins  43233;    Therapeutic Activity:         mins  12613;     Gait Training:         mins  75641;     Ultrasound:         mins  29933;    Electrical Stimulation:        mins  05549 ( );  Dry Needling         mins self-pay    Timed Treatment:   30   mins   Total Treatment:     30   mins    Maren Venegas, PT  Physical Therapist  [place] License # [id number]

## 2022-09-30 ENCOUNTER — TREATMENT (OUTPATIENT)
Dept: PHYSICAL THERAPY | Facility: CLINIC | Age: 67
End: 2022-09-30

## 2022-09-30 DIAGNOSIS — M43.6 STIFFNESS OF NECK: ICD-10-CM

## 2022-09-30 DIAGNOSIS — M54.31 SCIATICA OF RIGHT SIDE: Primary | ICD-10-CM

## 2022-09-30 PROCEDURE — 97140 MANUAL THERAPY 1/> REGIONS: CPT | Performed by: PHYSICAL THERAPIST

## 2022-10-06 ENCOUNTER — TREATMENT (OUTPATIENT)
Dept: PHYSICAL THERAPY | Facility: CLINIC | Age: 67
End: 2022-10-06

## 2022-10-06 DIAGNOSIS — M43.6 STIFFNESS OF NECK: ICD-10-CM

## 2022-10-06 DIAGNOSIS — M54.31 SCIATICA OF RIGHT SIDE: Primary | ICD-10-CM

## 2022-10-06 PROCEDURE — 97140 MANUAL THERAPY 1/> REGIONS: CPT | Performed by: PHYSICAL THERAPIST

## 2022-10-06 NOTE — PROGRESS NOTES
"Physical Therapy Daily Progress Note        Subjective     Nathan Burk reports: R elbow/forearm got sore yesterday. Loaded firewood and camping stuff. It had not been hurting before that. Very pleased with neck. Still get R sciatic pain with \"bending wrong\" but I can avoid that.     Objective     NDI 0%    Active Range of Motion   Cervical/Thoracic Spine   Cervical     Flexion: 45( was 30)egrees   Extension: 35 degrees   Left lateral flexion: 3( was 30 )degrees   Right lateral flexion: 45(was 35) degrees   Left rotation: 45(was 35) degrees   Right rotation: 60( was 45 )degrees       See Exercise, Manual, and Modality Logs for complete treatment.     Assessment/Plan     Short Term Goals ( 3 weeks) MET  1. Pt to be independent with HEP  2. Pt to exhibit increased cervical segmental mobility to allow for increased AROM  3. Pt to demonstrate proper posture without verbal cues  4. Pt to report decreased pain with ADL's [place] driving    Long Term Goals (  6 weeks) MET  1. Pt to exhibit >50 degrees of cervical rotation B to allow for viewing traffic without pain or limitations  2. Pt to exhibit 5/5 strength for DCF to allow for lifting and more strenuous daily activities  3. Pt to score < 10% on NDI  4. Pt able to perform ADL's and recreational activities without pain     Other  DC PT         Manual Therapy:  25       mins  76667;  Therapeutic Exercise:       mins  69105;     Neuromuscular Asif:       mins  97284;    Therapeutic Activity:         mins  77716;     Gait Training:         mins  34569;     Ultrasound:         mins  97370;    Electrical Stimulation:        mins  46769 ( );  Dry Needling         mins self-pay    Timed Treatment:   25   mins   Total Treatment:     25   mins    Maren Venegas, PT  Physical Therapist  [place] License # [id number]  "

## 2022-11-04 ENCOUNTER — TREATMENT (OUTPATIENT)
Dept: PHYSICAL THERAPY | Facility: CLINIC | Age: 67
End: 2022-11-04

## 2022-11-04 DIAGNOSIS — G44.209 ACUTE NON INTRACTABLE TENSION-TYPE HEADACHE: Primary | ICD-10-CM

## 2022-11-04 PROCEDURE — 97140 MANUAL THERAPY 1/> REGIONS: CPT | Performed by: PHYSICAL THERAPIST

## 2022-11-04 PROCEDURE — 97161 PT EVAL LOW COMPLEX 20 MIN: CPT | Performed by: PHYSICAL THERAPIST

## 2022-11-04 NOTE — PROGRESS NOTES
Physical Therapy Initial Evaluation and Plan of Care      Patient: Nathan Burk   : 1955  Diagnosis/ICD-10 Code:  Acute non intractable tension-type headache [G44.209]  Referring practitioner: Bud Rush MD    Subjective Evaluation    History of Present Illness  Mechanism of injury: 2-3 weeks ago wake up with headache. R subocc. IB helped. Used ice helped  Rotation fine      Patient Occupation: retired          Objective          Palpation     Right Tenderness of the suboccipitals.   Trigger point to suboccipitals.     Tenderness   Cervical Spine   Tenderness in the facet joint.     Passive Range of Motion   Cervical/Thoracic Spine   Cervical   Flexion: WFL  Extension: Neck passive extension: 75%   Left rotation: WFL  Right rotation: Neck passive rotation right: 75%     Additional Passive Range of Motion Details  Decreased C1-2 LROT  C1 stuck R    Decreased T1-2 rotation to L    Strength/Myotome Testing   Cervical Spine     Left   Normal strength    Right   Normal strength    Functional Assessment     Comments  NDI 14%          Assessment & Plan     Assessment  Impairments: abnormal muscle tone, abnormal or restricted ROM and lacks appropriate home exercise program  Functional Limitations: uncomfortable because of pain  Assessment details: Pt is a good candidate for skilled PT intervention, sierra manual therapy for spinal joint mobility, alignment, postural restoration and core strengthening to restore functional AROM and strength to return to previous level of ADL's.  Prognosis: good  Prognosis details: Pt has restricted upper cervical mobility causing tension in suboccipitals and a headache. Discussed postural positions to avoid and proper sleeping positions    Goals  Plan Goals: Short Term Goals ( 1 weeks)  1. Pt to be independent with HEP  2. Pt to exhibit increased cervical segmental mobility to allow for increased AROM  3. Pt to report decreased headaches upon rising in the morning    Long Term  Goals (  4 weeks)  1. Pt to exhibit normal upper cervical segmental mobility   2. Pt to report decreased use of NSAID's.  3. Pt to report absence of headache in the morning  4. Pt to score < 10% on NDI    Plan  Therapy options: will be seen for skilled therapy services  Planned therapy interventions: manual therapy, soft tissue mobilization, spinal/joint mobilization, stretching, strengthening, postural training and home exercise program  Frequency: 2x week  Duration in weeks: 4  Treatment plan discussed with: patient        Manual Therapy:    15     mins  69461;  Therapeutic Exercise:         mins  07342;     Neuromuscular Asif:        mins  81826;    Therapeutic Activity:          mins  16049;     Gait Training:           mins  34010;     Ultrasound:          mins  56277;    Electrical Stimulation:         mins  42567 ( );  Dry Needling          mins self-pay    Timed Treatment:   15   mins   Total Treatment:     35   mins    PT SIGNATURE: Maren Venegas PT   [place] License # [id number]  DATE TREATMENT INITIATED: 11/4/2022    Medicare Initial Certification  Certification Period: 2/2/2023  I certify that the therapy services are furnished while this patient is under my care.  The services outlined above are required by this patient, and will be reviewed every 90 days.     PHYSICIAN: Bud Rush MD      DATE:     Please sign and return via fax to [phone].. Thank you, [facility].

## 2022-11-09 ENCOUNTER — TREATMENT (OUTPATIENT)
Dept: PHYSICAL THERAPY | Facility: CLINIC | Age: 67
End: 2022-11-09

## 2022-11-09 DIAGNOSIS — G44.209 ACUTE NON INTRACTABLE TENSION-TYPE HEADACHE: Primary | ICD-10-CM

## 2022-11-09 PROCEDURE — 97140 MANUAL THERAPY 1/> REGIONS: CPT | Performed by: PHYSICAL THERAPIST

## 2022-11-09 NOTE — PROGRESS NOTES
Physical Therapy Daily Progress Note        Subjective     Nathan Burk reports: Felt pretty good for a couple days after last session. Still waking up with R post occipital headache. IB helps and symptoms gone within an hour.     Objective   See Exercise, Manual, and Modality Logs for complete treatment.       Assessment/Plan  Discussed using a towel roll in pillow when in sidelying position.  Pt to do this before returning to PT. LEss tightness and improved AA rotation after treatment today    Progress per Plan of Care           Manual Therapy:  25       mins  89719;  Therapeutic Exercise:       mins  92359;     Neuromuscular Asif:       mins  93549;    Therapeutic Activity:    5     mins  51301;     Gait Training:         mins  22346;     Ultrasound:         mins  96000;    Electrical Stimulation:        mins  56188 ( );  Dry Needling         mins self-pay    Timed Treatment:   30   mins   Total Treatment:     30   mins    Maren Venegas, PT  Physical Therapist  [place] License # [id number]

## 2022-11-11 ENCOUNTER — OFFICE VISIT (OUTPATIENT)
Dept: FAMILY MEDICINE CLINIC | Facility: CLINIC | Age: 67
End: 2022-11-11

## 2022-11-11 VITALS
HEART RATE: 69 BPM | BODY MASS INDEX: 29.81 KG/M2 | OXYGEN SATURATION: 96 % | TEMPERATURE: 96.6 F | SYSTOLIC BLOOD PRESSURE: 116 MMHG | DIASTOLIC BLOOD PRESSURE: 78 MMHG | HEIGHT: 71 IN | WEIGHT: 212.9 LBS

## 2022-11-11 DIAGNOSIS — M54.16 LUMBAR RADICULOPATHY: Primary | ICD-10-CM

## 2022-11-11 PROCEDURE — 99213 OFFICE O/P EST LOW 20 MIN: CPT | Performed by: FAMILY MEDICINE

## 2022-11-11 NOTE — PROGRESS NOTES
"Chief Complaint  Neck Pain and Back Pain    Subjective        Nathan Burk presents to [facility]  History of Present Illness     Follow-up chronic neck and back pain.  The back has been more of a problem lately.  His neck is feeling better with physical therapy.  He is now had the better part of 6 months of intermittent right lower back pain with shooting pain down the right leg.  Specially with certain movements such as bending over or getting out of a chair.  However no weakness no numbness no foot drop.  No saddle anesthesia.  No urinary or fecal incontinence.  The symptoms are momentary.  And getting a little bit worse however the last few weeks.    Objective   Vital Signs:  /78   Pulse 69   Temp 96.6 °F (35.9 °C) (Temporal)   Ht 180.3 cm (70.98\")   Wt 96.6 kg (212 lb 14.4 oz)   SpO2 96%   BMI 29.71 kg/m²   Estimated body mass index is 29.71 kg/m² as calculated from the following:    Height as of this encounter: 180.3 cm (70.98\").    Weight as of this encounter: 96.6 kg (212 lb 14.4 oz).          Physical Exam  Constitutional:       Appearance: Normal appearance. He is not ill-appearing.   Musculoskeletal:      Comments: Right hip good range of motion without pain.  Strength is 5 out of 5 all major muscle groups in lower extremities.  DTRs are +2/4 bilateral patellar reflexes 1/4 bilateral Achilles reflexes.  Gait is not antalgic.  Negative straight leg lift.  No pain sciatic notch.   Neurological:      Mental Status: He is alert.        Result Review :                Assessment and Plan   Diagnoses and all orders for this visit:    1. Lumbar radiculopathy (Primary)  -     MRI Lumbar Spine Without Contrast; Future      Persistent right-sided lumbar radiculopathy.  Patient is interested in a MRI.  This is reasonable given the duration of his symptoms and the worsening symptoms in recent weeks.  However the neurological exam appears to be normal and there is no red " flags otherwise.  Ordering the MRI.  Continue physical therapy.  I will see him back as scheduled.         Follow Up   No follow-ups on file.  Patient was given instructions and counseling regarding his condition or for health maintenance advice. Please see specific information pulled into the AVS if appropriate.

## 2022-11-15 ENCOUNTER — TREATMENT (OUTPATIENT)
Dept: PHYSICAL THERAPY | Facility: CLINIC | Age: 67
End: 2022-11-15

## 2022-11-15 DIAGNOSIS — G44.209 ACUTE NON INTRACTABLE TENSION-TYPE HEADACHE: Primary | ICD-10-CM

## 2022-11-15 PROCEDURE — 97140 MANUAL THERAPY 1/> REGIONS: CPT | Performed by: PHYSICAL THERAPIST

## 2022-11-16 NOTE — PROGRESS NOTES
Physical Therapy Daily Progress Note        Subjective     Nathan Burk reports: I haven't had a headache or took any IB last 4 days. Using towel roll in pillow helping.    Objective   See Exercise, Manual, and Modality Logs for complete treatment.       Assessment/Plan  Much improved upper cervical mobility with min pain and no headache.    Anticipate DC next Visit           Manual Therapy:  25       mins  05098;  Therapeutic Exercise:       mins  63672;     Neuromuscular Asif:       mins  29100;    Therapeutic Activity:         mins  75312;     Gait Training:         mins  07961;     Ultrasound:         mins  17237;    Electrical Stimulation:        mins  09299 ( );  Dry Needling         mins self-pay    Timed Treatment:   25   mins   Total Treatment:     25   mins    Maren Venegas, PT  Physical Therapist  [place] License # [id number]

## 2022-12-01 ENCOUNTER — TREATMENT (OUTPATIENT)
Dept: PHYSICAL THERAPY | Facility: CLINIC | Age: 67
End: 2022-12-01

## 2022-12-01 DIAGNOSIS — G44.209 ACUTE NON INTRACTABLE TENSION-TYPE HEADACHE: Primary | ICD-10-CM

## 2022-12-01 PROCEDURE — 97140 MANUAL THERAPY 1/> REGIONS: CPT | Performed by: PHYSICAL THERAPIST

## 2022-12-01 NOTE — PROGRESS NOTES
Physical Therapy Daily Progress Note        Subjective     Nathan Burk reports: Neck much better. No headaches in morning and no meds. Decided to not do MRI for back at this time    Objective   Full PROM Cspine SB and ROT B without pain.  See Exercise, Manual, and Modality Logs for complete treatment.       Assessment/Plan  Mild segmental restrictions Cspine but resolved with manual treatment. Pt would like to recheck one more time in about a week. If continuing to do well will Dc at that time     Anticipate DC next Visit           Manual Therapy:  25       mins  85956;  Therapeutic Exercise:       mins  64150;     Neuromuscular Asif:       mins  27968;    Therapeutic Activity:         mins  64814;     Gait Training:         mins  96082;     Ultrasound:         mins  15655;    Electrical Stimulation:        mins  95405 ( );  Dry Needling         mins self-pay    Timed Treatment:   25   mins   Total Treatment:     25   mins    Maren Venegas, PT  Physical Therapist  [place] License # [id number]

## 2022-12-05 ENCOUNTER — OFFICE VISIT (OUTPATIENT)
Dept: FAMILY MEDICINE CLINIC | Facility: CLINIC | Age: 67
End: 2022-12-05

## 2022-12-05 VITALS
HEIGHT: 71 IN | DIASTOLIC BLOOD PRESSURE: 72 MMHG | WEIGHT: 217 LBS | BODY MASS INDEX: 30.38 KG/M2 | TEMPERATURE: 97.5 F | HEART RATE: 73 BPM | SYSTOLIC BLOOD PRESSURE: 118 MMHG | OXYGEN SATURATION: 97 % | RESPIRATION RATE: 16 BRPM

## 2022-12-05 DIAGNOSIS — Z87.891 PERSONAL HISTORY OF SMOKING: ICD-10-CM

## 2022-12-05 DIAGNOSIS — E78.00 PURE HYPERCHOLESTEROLEMIA: ICD-10-CM

## 2022-12-05 DIAGNOSIS — Z00.00 MEDICARE ANNUAL WELLNESS VISIT, SUBSEQUENT: Primary | ICD-10-CM

## 2022-12-05 DIAGNOSIS — R73.01 IMPAIRED FASTING GLUCOSE: ICD-10-CM

## 2022-12-05 PROCEDURE — G0439 PPPS, SUBSEQ VISIT: HCPCS | Performed by: FAMILY MEDICINE

## 2022-12-05 PROCEDURE — 1170F FXNL STATUS ASSESSED: CPT | Performed by: FAMILY MEDICINE

## 2022-12-05 PROCEDURE — 1160F RVW MEDS BY RX/DR IN RCRD: CPT | Performed by: FAMILY MEDICINE

## 2022-12-05 NOTE — PROGRESS NOTES
The ABCs of the Annual Wellness Visit  Subsequent Medicare Wellness Visit    Chief Complaint   Patient presents with   • Medicare Wellness-Initial Visit      Subjective    History of Present Illness:  Nathan Burk is a 67 y.o. male who presents for a Subsequent Medicare Wellness Visit.      Here for Medicare wellness visit only.  His medical problems are otherwise stable.  His lipid panel is improved overall.  He is exercising more.  The fasting glucose is improved.  The A1c remains very low.    The following portions of the patient's history were reviewed and   updated as appropriate: allergies, current medications, past family history, past medical history, past social history, past surgical history and problem list.    Compared to one year ago, the patient feels his physical   health is the same.    Compared to one year ago, the patient feels his mental   health is the same.    Recent Hospitalizations:  He was not admitted to the hospital during the last year.       Current Medical Providers:  Patient Care Team:  Bud Rush MD as PCP - General (Family Medicine)    Outpatient Medications Prior to Visit   Medication Sig Dispense Refill   • ANORO ELLIPTA 62.5-25 MCG/INH aerosol powder  inhaler Inhale 1 puff Daily.  6   • ascorbic acid (VITAMIN C) 1000 MG tablet Take 1,000 mg by mouth Daily.     • atorvastatin (LIPITOR) 40 MG tablet TAKE 1 TABLET BY MOUTH EVERY DAY 90 tablet 1   • azelastine (ASTELIN) 0.1 % nasal spray 1 spray into the nostril(s) as directed by provider Daily. Use in each nostril as directed     • CALCIUM-MAGNESIUM-ZINC PO Take 1 tablet by mouth Daily.     • cholecalciferol (VITAMIN D3) 10 MCG (400 UNIT) tablet Take 400 Units by mouth Daily.     • Cyanocobalamin (B-12) 1000 MCG capsule Take 1 capsule by mouth Daily.     • DUPIXENT 300 MG/2ML solution prefilled syringe Every 14 (Fourteen) Days. LAST DOSE July 4, 2021.  TAKING FOR ALLERGY RELATED SYMTOMS     • Flovent  MCG/ACT inhaler  "Inhale 2 puffs Every Morning.     • lansoprazole (PREVACID) 30 MG capsule Take 30 mg by mouth Daily.     • Loratadine-Pseudoephedrine (CLARITIN-D 12 HOUR PO) Take 1 tablet by mouth Daily.     • Magnesium 250 MG tablet Take 1 tablet by mouth Daily.     • Menaquinone-7 (VITAMIN K2 PO) Take 1 tablet by mouth Every Morning.     • montelukast (SINGULAIR) 10 MG tablet Take 10 mg by mouth Every Morning.     • VITAMIN A PO Take 1 tablet by mouth Daily.     • Zinc 50 MG capsule Take 1 capsule by mouth Every Morning.       Facility-Administered Medications Prior to Visit   Medication Dose Route Frequency Provider Last Rate Last Admin   • nitroglycerin (NITROSTAT) SL tablet 0.4 mg  0.4 mg Sublingual Q5 Min PRN Prashanth Moctezuma III, MD           No opioid medication identified on active medication list. I have reviewed chart for other potential  high risk medication/s and harmful drug interactions in the elderly.          Aspirin is not on active medication list.  Aspirin use is not indicated based on review of current medical condition/s. Risk of harm outweighs potential benefits.  .    Patient Active Problem List   Diagnosis   • Colon polyps   • FH: colon cancer   • Pure hypercholesterolemia   • Chronic cough   • Gastroesophageal reflux disease without esophagitis   • Impaired fasting glucose   • NAFLD (nonalcoholic fatty liver disease)   • Colon polyp   • Family history of colon cancer   • Status post neck surgery, follow-up exam     Advance Care Planning  Advance Directive is not on file.  ACP discussion was held with the patient during this visit. Patient has an advance directive (not in EMR), copy requested.          Objective    Vitals:    12/05/22 1111   BP: 118/72   BP Location: Left arm   Patient Position: Sitting   Cuff Size: Large Adult   Pulse: 73   Resp: 16   Temp: 97.5 °F (36.4 °C)   TempSrc: Temporal   SpO2: 97%   Weight: 98.4 kg (217 lb)   Height: 180.3 cm (70.98\")   PainSc: 0-No pain     Estimated body mass " "index is 30.28 kg/m² as calculated from the following:    Height as of this encounter: 180.3 cm (70.98\").    Weight as of this encounter: 98.4 kg (217 lb).    BMI is >= 30 and <35. (Class 1 Obesity). The following options were offered after discussion;: exercise counseling/recommendations      Does the patient have evidence of cognitive impairment? No    Physical Exam  Constitutional:       Appearance: Normal appearance.   HENT:      Head: Atraumatic.      Mouth/Throat:      Pharynx: Oropharynx is clear. No oropharyngeal exudate or posterior oropharyngeal erythema.   Eyes:      Conjunctiva/sclera: Conjunctivae normal.   Neck:      Thyroid: No thyroid mass, thyromegaly or thyroid tenderness.   Cardiovascular:      Rate and Rhythm: Normal rate and regular rhythm.      Pulses: Normal pulses.      Heart sounds: Normal heart sounds.   Pulmonary:      Effort: Pulmonary effort is normal.      Breath sounds: Normal breath sounds.   Abdominal:      General: Abdomen is flat. There is no distension.      Palpations: Abdomen is soft. There is no mass.      Tenderness: There is no abdominal tenderness.      Hernia: No hernia is present.   Musculoskeletal:         General: Normal range of motion.      Cervical back: Normal range of motion and neck supple. No muscular tenderness.   Lymphadenopathy:      Cervical: No cervical adenopathy.   Skin:     General: Skin is warm and dry.      Findings: No rash.   Neurological:      General: No focal deficit present.      Mental Status: He is alert and oriented to person, place, and time.   Psychiatric:         Mood and Affect: Mood normal.                 HEALTH RISK ASSESSMENT    Smoking Status:  Social History     Tobacco Use   Smoking Status Former   • Packs/day: 0.50   • Years: 20.00   • Pack years: 10.00   • Types: Cigarettes   • Start date: 1974   • Quit date: 1994   • Years since quittin.9   Smokeless Tobacco Never     Alcohol Consumption:  Social History     Substance " and Sexual Activity   Alcohol Use Yes    Comment: social 1-2 PER WEEK     Fall Risk Screen:    DIRK Fall Risk Assessment was completed, and patient is at LOW risk for falls.Assessment completed on:12/5/2022    Depression Screening:  PHQ-2/PHQ-9 Depression Screening 12/5/2022   Retired PHQ-9 Total Score -   Retired Total Score -   Little Interest or Pleasure in Doing Things 0-->not at all   Feeling Down, Depressed or Hopeless 0-->not at all   PHQ-9: Brief Depression Severity Measure Score 0       Health Habits and Functional and Cognitive Screening:  Functional & Cognitive Status 12/5/2022   Do you have difficulty preparing food and eating? No   Do you have difficulty bathing yourself, getting dressed or grooming yourself? No   Do you have difficulty using the toilet? No   Do you have difficulty moving around from place to place? No   Do you have trouble with steps or getting out of a bed or a chair? No   Current Diet Well Balanced Diet   Dental Exam Up to date   Eye Exam Up to date   Exercise (times per week) 5 times per week   Current Exercises Include Treadmill;Aerobics;Walking   Do you need help using the phone?  No   Are you deaf or do you have serious difficulty hearing?  No   Do you need help with transportation? No   Do you need help shopping? No   Do you need help preparing meals?  No   Do you need help with housework?  No   Do you need help with laundry? No   Do you need help taking your medications? No   Do you need help managing money? No   Do you ever drive or ride in a car without wearing a seat belt? No   Have you felt unusual stress, anger or loneliness in the last month? No   Who do you live with? Spouse   If you need help, do you have trouble finding someone available to you? No   Have you been bothered in the last four weeks by sexual problems? No   Do you have difficulty concentrating, remembering or making decisions? No       Age-appropriate Screening Schedule:  Refer to the list below for  future screening recommendations based on patient's age, sex and/or medical conditions. Orders for these recommended tests are listed in the plan section. The patient has been provided with a written plan.    Health Maintenance   Topic Date Due   • ZOSTER VACCINE (2 of 2) 03/23/2022   • LIPID PANEL  08/04/2023   • TDAP/TD VACCINES (2 - Td or Tdap) 02/08/2028   • INFLUENZA VACCINE  Completed              Assessment & Plan   CMS Preventative Services Quick Reference  Risk Factors Identified During Encounter  Immunizations Discussed/Encouraged (specific Immunizations; Shingrix  The above risks/problems have been discussed with the patient.  Follow up actions/plans if indicated are seen below in the Assessment/Plan Section.  Pertinent information has been shared with the patient in the After Visit Summary.    Diagnoses and all orders for this visit:    1. Medicare annual wellness visit, subsequent (Primary)    2. Pure hypercholesterolemia  -     CBC & Differential; Future  -     Comprehensive Metabolic Panel; Future  -     Lipid Panel; Future  -     Hemoglobin A1c; Future    3. Impaired fasting glucose  -     CBC & Differential; Future  -     Comprehensive Metabolic Panel; Future  -     Lipid Panel; Future  -     Hemoglobin A1c; Future    4. Personal history of smoking  -      AAA Screen Limited; Future  -     CBC & Differential; Future  -     Comprehensive Metabolic Panel; Future  -     Lipid Panel; Future  -     Hemoglobin A1c; Future      Here for annual Medicare wellness visit.    Immunizations up-to-date.    Colon cancer screening up-to-date.    Prostate cancer screening up-to-date.    AAA screen, remote history of smoking.  Does not qualify for lung cancer screening at this time.    Follow-up in 6 months.  Sooner as needed.    Follow Up:   No follow-ups on file.     An After Visit Summary and PPPS were made available to the patient.

## 2022-12-06 RX ORDER — ATORVASTATIN CALCIUM 40 MG/1
TABLET, FILM COATED ORAL
Qty: 90 TABLET | Refills: 1 | Status: SHIPPED | OUTPATIENT
Start: 2022-12-06

## 2022-12-06 NOTE — TELEPHONE ENCOUNTER
Rx Refill Note  Requested Prescriptions     Pending Prescriptions Disp Refills   • atorvastatin (LIPITOR) 40 MG tablet [Pharmacy Med Name: ATORVASTATIN 40 MG TABLET] 90 tablet 1     Sig: TAKE 1 TABLET BY MOUTH EVERY DAY      Last office visit with prescribing clinician: 12/5/2022   Last telemedicine visit with prescribing clinician: Visit date not found   Next office visit with prescribing clinician: 6/12/2023       {TIP  Please add Last Relevant Lab Date if appropriate: 8/4/22                 Would you like a call back once the refill request has been completed: [] Yes [] No    If the office needs to give you a call back, can they leave a voicemail: [] Yes [] No    Shalom Morelos MA  12/06/22, 16:35 EST

## 2022-12-09 ENCOUNTER — TREATMENT (OUTPATIENT)
Dept: PHYSICAL THERAPY | Facility: CLINIC | Age: 67
End: 2022-12-09

## 2022-12-09 ENCOUNTER — HOSPITAL ENCOUNTER (OUTPATIENT)
Dept: ULTRASOUND IMAGING | Facility: HOSPITAL | Age: 67
Discharge: HOME | End: 2022-12-09
Admitting: FAMILY MEDICINE

## 2022-12-09 DIAGNOSIS — G44.209 ACUTE NON INTRACTABLE TENSION-TYPE HEADACHE: Primary | ICD-10-CM

## 2022-12-09 DIAGNOSIS — Z87.891 PERSONAL HISTORY OF SMOKING: ICD-10-CM

## 2022-12-09 PROCEDURE — 97140 MANUAL THERAPY 1/> REGIONS: CPT | Performed by: PHYSICAL THERAPIST

## 2022-12-09 PROCEDURE — 76706 US ABDL AORTA SCREEN AAA: CPT

## 2022-12-09 NOTE — PROGRESS NOTES
Re-Assessment / Re-Certification      Patient: Nathan Burk   : 1955  Diagnosis/ICD-10 Code:  Acute non intractable tension-type headache [G44.209]  Referring practitioner: Bud Rush MD  Date of Initial Visit: 2022  Today's Date: 2022  Patient seen for 5 sessions      Subjective:   Nathan Burk reports: Headache intermittent. No rhyme or reason. Doing my stretches and using towel roll in pillow for support.  Subjective Questionnaire: NDI:4% ( was 22%)  Clinical Progress: improved  Home Program Compliance: Yes  Treatment has included: therapeutic exercise and manual therapy    Objective    Mild segmental restrictions R C1-2, OA and C2-3     Assessment/Plan   [place] in [place]. PLan to reassess upon [place] in [place]    Short Term Goals ( 1 weeks) MET  1. Pt to be independent with HEP  2. Pt to exhibit increased cervical segmental mobility to allow for increased AROM  3. Pt to report decreased headaches upon rising in the morning    Long Term Goals (  4 weeks)  1. Pt to exhibit normal upper cervical segmental mobility PROGRESSING   2. Pt to report decreased use of NSAID's. MET  3. Pt to report absence of headache in the morning PARTIALLY  4. Pt to score < 10% on NDI MET  Progress toward previous goals: All Met        Recommendations: Continue as planned  Timeframe: 1 month  Prognosis to achieve goals: good    PT Signature: Maren Venegas, PT  [place] License # [id number]    Based upon review of the patient's progress and continued therapy plan, it is my medical opinion that Nathan Burk should continue physical therapy treatment at [facility]  [address] [place] [id number] [phone].    Signature: __________________________________  Bud Rush MD    Manual Therapy:    25    mins  12854;  Therapeutic Exercise:         mins  84199;     Neuromuscular Asif:        mins  12806;    Therapeutic Activity:      5     mins  20888;     Gait Training:           mins  53189;     Ultrasound:          mins  23363;    Electrical Stimulation:         mins  09075 ( );  Dry Needling          mins self-pay    Timed Treatment:   30   mins   Total Treatment:     30   mins

## 2023-01-11 ENCOUNTER — TREATMENT (OUTPATIENT)
Dept: PHYSICAL THERAPY | Facility: CLINIC | Age: 68
End: 2023-01-11
Payer: MEDICARE

## 2023-01-11 DIAGNOSIS — G44.209 ACUTE NON INTRACTABLE TENSION-TYPE HEADACHE: Primary | ICD-10-CM

## 2023-01-11 PROCEDURE — 97140 MANUAL THERAPY 1/> REGIONS: CPT | Performed by: PHYSICAL THERAPIST

## 2023-01-30 ENCOUNTER — TREATMENT (OUTPATIENT)
Dept: PHYSICAL THERAPY | Facility: CLINIC | Age: 68
End: 2023-01-30
Payer: MEDICARE

## 2023-01-30 DIAGNOSIS — G89.29 CHRONIC RIGHT-SIDED LOW BACK PAIN WITH RIGHT-SIDED SCIATICA: Primary | ICD-10-CM

## 2023-01-30 DIAGNOSIS — M54.41 CHRONIC RIGHT-SIDED LOW BACK PAIN WITH RIGHT-SIDED SCIATICA: Primary | ICD-10-CM

## 2023-01-30 PROCEDURE — 97012 MECHANICAL TRACTION THERAPY: CPT | Performed by: PHYSICAL THERAPIST

## 2023-01-30 PROCEDURE — 97110 THERAPEUTIC EXERCISES: CPT | Performed by: PHYSICAL THERAPIST

## 2023-01-30 PROCEDURE — 97530 THERAPEUTIC ACTIVITIES: CPT | Performed by: PHYSICAL THERAPIST

## 2023-01-30 NOTE — PROGRESS NOTES
Re-Assessment / Re-Certification      Patient: Nathan Burk   : 1955  Diagnosis/ICD-10 Code:  Chronic right-sided low back pain with right-sided sciatica [M54.41, G89.29]  Referring practitioner: Bud Rush MD  Date of Initial Visit: Type: THERAPY  Noted: 2023  Today's Date: 2023  Patient seen for 1 sessions      Subjective:   Nathan Burk reports: he had been sitting for 15-20 mins this morning and he stood up and could barely walk due to pain down his R LE. He can relieve some pain if he bends to the L side. Pain is mostly from knee down. He's experienced this before with bending over. He was pending an MRI but his pain got better so he didn't have the MRI. He will be leaving for [place] in about 5 days so he'd like to get this taken care of as quickly as possible.  Subjective Questionnaire: Oswestry: 3350  Clinical Progress: worse  Home Program Compliance: Yes  Treatment has included: therapeutic exercise, neuromuscular re-education, manual therapy, therapeutic activity, electrical stimulation, ultrasound, moist heat and cryotherapy    Subjective Evaluation    Pain  Current pain ratin  At worst pain rating: 10  Quality: needle-like         Objective          Static Posture   General Observations  Shifted left.     Neurological Testing     Sensation     Lumbar   Left   Intact: light touch    Right   Intact: light touch    Reflexes   Left   Patellar (L4): trace (1+)  Clonus sign: negative    Right   Patellar (L4): trace (1+)  Clonus sign: negative    Active Range of Motion     Lumbar   Flexion: 60 degrees with pain  Extension: 10 degrees     Tests       Thoracic   Positive slump.     Lumbar   Positive repeated flexion.   Negative repeated extension.     Left   Negative crossed SLR and passive SLR.     Right   Positive passive SLR.   Negative crossed SLR.     Additional Tests Details  Repeated flexion exacerbates symptoms, repeated extension relieves symptoms. Lateral shift  correction exacerbated symptoms.      Assessment & Plan     Assessment  Impairments: activity intolerance and pain with function  Functional Limitations: walking, sitting and standing  Assessment details: Patient exhibits signs and symptoms of lumbar radiculopathy with limited lumbar AROM, increased neural tension signs, pain and paresthesia in R LE, altered posture/lateral shift in standing and inability to tolerate standing or sitting for long periods due to increasing leg pain. Patient would benefit from additional skilled therapy to address the problems listed above and to return to prior level of function with less pain/limitations.     Plan  Therapy options: will be seen for skilled therapy services  Planned modality interventions: traction, electrical stimulation/Russian stimulation and TENS  Planned therapy interventions: manual therapy, therapeutic activities, stretching, strengthening, spinal/joint mobilization, soft tissue mobilization, neuromuscular re-education, joint mobilization, IADL retraining, home exercise program, functional ROM exercises, flexibility, ADL retraining and abdominal trunk stabilization  Frequency: 2x week  Treatment plan discussed with: patient        Visit Diagnoses:    ICD-10-CM ICD-9-CM   1. Chronic right-sided low back pain with right-sided sciatica  M54.41 724.2    G89.29 724.3     338.29       Progress toward previous goals: Not Met    New Goals   Short-term goals (STG): 1. Pt will decrease current pain to 2/10.  2.Pt will exhibit negative slump test.  3. Pt will exhibit normal standing posture with no L lateral shift.  Long-term goals (LTG): 1. Pt will report ability to sit at least 30 minutes with no increase in radicular symptoms.      Recommendations: Continue as planned  Timeframe: 1 month  Prognosis to achieve goals: good    PT Signature: Keon Del Cid PT, DPT, OCS      Based upon review of the patient's progress and continued therapy plan, it is my medical opinion  that Nathan Burk should continue physical therapy treatment at [facility]  [address] [facility], [address] [address] [phone].    Signature: __________________________________  Bud Rush MD    Timed:  Manual Therapy:    8     mins  89835;  Therapeutic Exercise:    10     mins  81260;     Neuromuscular Asif:        mins  52024;    Therapeutic Activity:     10     mins  00743;     Gait Training:           mins  86059;     Ultrasound:          mins  78080;    Electrical Stimulation:         mins  12169 ( );    Untimed:  Electrical Stimulation:         mins  97937 ( );  Mechanical Traction:    15     mins  96676;     Timed Treatment:      mins   Total Treatment:        mins

## 2023-02-01 ENCOUNTER — TREATMENT (OUTPATIENT)
Dept: PHYSICAL THERAPY | Facility: CLINIC | Age: 68
End: 2023-02-01
Payer: MEDICARE

## 2023-02-01 DIAGNOSIS — G89.29 CHRONIC RIGHT-SIDED LOW BACK PAIN WITH RIGHT-SIDED SCIATICA: Primary | ICD-10-CM

## 2023-02-01 DIAGNOSIS — M54.41 CHRONIC RIGHT-SIDED LOW BACK PAIN WITH RIGHT-SIDED SCIATICA: Primary | ICD-10-CM

## 2023-02-01 PROCEDURE — 97530 THERAPEUTIC ACTIVITIES: CPT | Performed by: PHYSICAL THERAPIST

## 2023-02-01 PROCEDURE — 97012 MECHANICAL TRACTION THERAPY: CPT | Performed by: PHYSICAL THERAPIST

## 2023-02-01 NOTE — PROGRESS NOTES
"Physical Therapy Daily Treatment Note  [address]  [facility] [address]  [phone]    Patient: Nathan Burk   : 1955  Referring practitioner: Bud Rush MD  Date of Initial Visit: Type: THERAPY  Noted: 2023  Today's Date: 2023  Patient seen for 2 sessions       Visit Diagnoses:    ICD-10-CM ICD-9-CM   1. Chronic right-sided low back pain with right-sided sciatica  M54.41 724.2    G89.29 724.3     338.29       Nathan Burk reports: I have no pain today. \"she fixed me last visit\". Wondering about if driving to [place] a good idea or not.       Objective   SLR (-) B today  Prone pressup 100% w/o pain  See Exercise, Manual, and Modality Logs for complete treatment.       Assessment/Plan  After discussion today, pt was sitting in flexed position before onset of pain. Advised a lumbar support/pilow/cushion for driving. Would be wiser to make one trip in 2 weeks vs 2 separate trips in 2 weeks as he originally planned.     Progress per Plan of Care and Other  Pt to contact us if he needs further PT      Timed:         Manual Therapy:    8     mins  41298;     Therapeutic Exercise:         mins  64579;     Neuromuscular Asif:        mins  61600;    Therapeutic Activity:     10     mins  44378;     Gait Training:           mins  20085;     Ultrasound:          mins  35338;    Ionto                                   mins   08968  Self Care                            mins   51289      Un-Timed:  Electrical Stimulation:         mins  06739 ( );  Dry Needling          mins self-pay  Traction     15     mins 67904  Canalith Repos         mins 04393      Timed Treatment:   18   mins   Total Treatment:     33   mins    Maren Venegas, PT  Physical Therapist  [place] License # [id number]  "

## 2023-05-22 ENCOUNTER — TELEPHONE (OUTPATIENT)
Dept: FAMILY MEDICINE CLINIC | Facility: CLINIC | Age: 68
End: 2023-05-22

## 2023-05-23 NOTE — TELEPHONE ENCOUNTER
All medical records request are handled through [employer] or release if information. To check on the status they will need to call [phone]

## 2023-06-05 DIAGNOSIS — E78.00 PURE HYPERCHOLESTEROLEMIA: ICD-10-CM

## 2023-06-05 DIAGNOSIS — Z87.891 PERSONAL HISTORY OF SMOKING: ICD-10-CM

## 2023-06-05 DIAGNOSIS — R73.01 IMPAIRED FASTING GLUCOSE: ICD-10-CM

## 2023-06-06 LAB
ALBUMIN SERPL-MCNC: 5 G/DL (ref 3.8–4.8)
ALBUMIN/GLOB SERPL: 2.6 {RATIO} (ref 1.2–2.2)
ALP SERPL-CCNC: 87 IU/L (ref 44–121)
ALT SERPL-CCNC: 31 IU/L (ref 0–44)
AST SERPL-CCNC: 31 IU/L (ref 0–40)
BASOPHILS # BLD AUTO: 0.1 X10E3/UL (ref 0–0.2)
BASOPHILS NFR BLD AUTO: 1 %
BILIRUB SERPL-MCNC: 0.6 MG/DL (ref 0–1.2)
BUN SERPL-MCNC: 23 MG/DL (ref 8–27)
BUN/CREAT SERPL: 22 (ref 10–24)
CALCIUM SERPL-MCNC: 10.1 MG/DL (ref 8.6–10.2)
CHLORIDE SERPL-SCNC: 103 MMOL/L (ref 96–106)
CHOLEST SERPL-MCNC: 171 MG/DL (ref 100–199)
CO2 SERPL-SCNC: 23 MMOL/L (ref 20–29)
CREAT SERPL-MCNC: 1.06 MG/DL (ref 0.76–1.27)
EGFRCR SERPLBLD CKD-EPI 2021: 77 ML/MIN/1.73
EOSINOPHIL # BLD AUTO: 0.1 X10E3/UL (ref 0–0.4)
EOSINOPHIL NFR BLD AUTO: 1 %
ERYTHROCYTE [DISTWIDTH] IN BLOOD BY AUTOMATED COUNT: 12.8 % (ref 11.6–15.4)
GLOBULIN SER CALC-MCNC: 1.9 G/DL (ref 1.5–4.5)
GLUCOSE SERPL-MCNC: 107 MG/DL (ref 70–99)
HBA1C MFR BLD: 5.7 % (ref 4.8–5.6)
HCT VFR BLD AUTO: 51.7 % (ref 37.5–51)
HDLC SERPL-MCNC: 45 MG/DL
HGB BLD-MCNC: 17.5 G/DL (ref 13–17.7)
IMM GRANULOCYTES # BLD AUTO: 0 X10E3/UL (ref 0–0.1)
IMM GRANULOCYTES NFR BLD AUTO: 1 %
LDLC SERPL CALC-MCNC: 107 MG/DL (ref 0–99)
LYMPHOCYTES # BLD AUTO: 1.9 X10E3/UL (ref 0.7–3.1)
LYMPHOCYTES NFR BLD AUTO: 23 %
MCH RBC QN AUTO: 31.1 PG (ref 26.6–33)
MCHC RBC AUTO-ENTMCNC: 33.8 G/DL (ref 31.5–35.7)
MCV RBC AUTO: 92 FL (ref 79–97)
MONOCYTES # BLD AUTO: 0.6 X10E3/UL (ref 0.1–0.9)
MONOCYTES NFR BLD AUTO: 7 %
NEUTROPHILS # BLD AUTO: 5.7 X10E3/UL (ref 1.4–7)
NEUTROPHILS NFR BLD AUTO: 67 %
PLATELET # BLD AUTO: 222 X10E3/UL (ref 150–450)
POTASSIUM SERPL-SCNC: 5.3 MMOL/L (ref 3.5–5.2)
PROT SERPL-MCNC: 6.9 G/DL (ref 6–8.5)
RBC # BLD AUTO: 5.63 X10E6/UL (ref 4.14–5.8)
SODIUM SERPL-SCNC: 142 MMOL/L (ref 134–144)
TRIGL SERPL-MCNC: 103 MG/DL (ref 0–149)
VLDLC SERPL CALC-MCNC: 19 MG/DL (ref 5–40)
WBC # BLD AUTO: 8.3 X10E3/UL (ref 3.4–10.8)

## 2023-06-12 ENCOUNTER — OFFICE VISIT (OUTPATIENT)
Dept: FAMILY MEDICINE CLINIC | Facility: CLINIC | Age: 68
End: 2023-06-12
Payer: MEDICARE

## 2023-06-12 VITALS
DIASTOLIC BLOOD PRESSURE: 81 MMHG | SYSTOLIC BLOOD PRESSURE: 129 MMHG | WEIGHT: 214.4 LBS | TEMPERATURE: 97.3 F | HEIGHT: 71 IN | BODY MASS INDEX: 30.02 KG/M2 | HEART RATE: 71 BPM | OXYGEN SATURATION: 95 %

## 2023-06-12 DIAGNOSIS — Z12.5 SCREENING PSA (PROSTATE SPECIFIC ANTIGEN): ICD-10-CM

## 2023-06-12 DIAGNOSIS — R73.01 IMPAIRED FASTING GLUCOSE: Chronic | ICD-10-CM

## 2023-06-12 DIAGNOSIS — E78.00 PURE HYPERCHOLESTEROLEMIA: Primary | Chronic | ICD-10-CM

## 2023-06-12 DIAGNOSIS — R71.8 ELEVATED HEMATOCRIT: ICD-10-CM

## 2023-06-12 PROBLEM — K76.0 NAFLD (NONALCOHOLIC FATTY LIVER DISEASE): Chronic | Status: RESOLVED | Noted: 2018-11-13 | Resolved: 2023-06-12

## 2023-06-12 PROCEDURE — 99214 OFFICE O/P EST MOD 30 MIN: CPT | Performed by: FAMILY MEDICINE

## 2023-06-12 NOTE — PROGRESS NOTES
"Chief Complaint  Hyperlipidemia    Subjective        Nathan Burk presents to [facility]  History of Present Illness    Hyperlipidemia.  Patient continues atorvastatin 40 mg daily.  His lipid panel is actually improved since last visit.  HDL higher, LDL lower, triglycerides lower.  He states he really has not changed his diet much.  He thinks he eats too many cookies.  His alcohol use is less over the last number of years.  He has 2 cocktails a week.  He is getting some exercise.  He has impaired fasting glucose.  Glucose recently 107, he thinks is related to cookies.  A1c unchanged at 5.7%.  His hematocrit is minimally elevated.  He states he has not drank any water the morning of the blood work.  His creatinine was normal.  Blood pressure looks acceptable.    Objective   Vital Signs:  /81   Pulse 71   Temp 97.3 °F (36.3 °C) (Temporal)   Ht 180.3 cm (70.98\")   Wt 97.3 kg (214 lb 6.4 oz)   SpO2 95%   BMI 29.92 kg/m²   Estimated body mass index is 29.92 kg/m² as calculated from the following:    Height as of this encounter: 180.3 cm (70.98\").    Weight as of this encounter: 97.3 kg (214 lb 6.4 oz).             Physical Exam  Vitals and nursing note reviewed.   Constitutional:       General: He is not in acute distress.     Appearance: He is well-developed.   Cardiovascular:      Rate and Rhythm: Normal rate and regular rhythm.   Pulmonary:      Effort: Pulmonary effort is normal.      Breath sounds: Normal breath sounds.   Skin:     General: Skin is warm and dry.   Psychiatric:         Mood and Affect: Mood normal.      Result Review :  The following data was reviewed by: Bud Rush MD on 06/12/2023:  Common labs          8/4/2022    08:26 6/5/2023    09:11   Common Labs   Glucose 97  107    BUN 17  23    Creatinine 1.00  1.06    Sodium 139  142    Potassium 4.5  5.3    Chloride 99  103    Calcium 9.8  10.1    Total Protein 6.8  6.9    Albumin 4.8  5.0    Total " Bilirubin 0.7  0.6    Alkaline Phosphatase 88  87    AST (SGOT) 32  31    ALT (SGPT) 29  31    WBC 6.9  8.3    Hemoglobin 17.2  17.5    Hematocrit 50.0  51.7    Platelets 231  222    Total Cholesterol 192  171    Triglycerides 212  103    HDL Cholesterol 39  45    LDL Cholesterol  116  107    Hemoglobin A1C 5.7  5.7    PSA 0.5                    Assessment and Plan   Diagnoses and all orders for this visit:    1. Pure hypercholesterolemia (Primary)  -     Hemoglobin A1c; Future  -     Comprehensive Metabolic Panel; Future  -     Lipid Panel; Future    2. Impaired fasting glucose  -     Hemoglobin A1c; Future  -     Comprehensive Metabolic Panel; Future  -     Lipid Panel; Future    3. NAFLD (nonalcoholic fatty liver disease)    4. Screening PSA (prostate specific antigen)  -     PSA Screen; Future    5. Elevated hematocrit  -     CBC & Differential; Future      Hyperlipidemia.  The lipid panel is somewhat more favorable.  He has not changed his diet nor his medication.  But something is going right.  Continue to monitor.  Weight has been stable.    Elevated glucose.  A1c average glucose test is stable.  Fasting glucose elevated at 107.  No diabetes criteria.  Continue to monitor.    Mild potential erythrocytosis.  Hematocrit borderline high.  Could be from dehydration.  Is been creeping up over the last couple years.  At this time there is no evidence of erythrocytosis that is clinically relevant.  However I will recheck prior to next visit.  Maintain hydration.  To consider donating blood.  If much higher to consider hematological consultation.             Follow Up   No follow-ups on file.  Patient was given instructions and counseling regarding his condition or for health maintenance advice. Please see specific information pulled into the AVS if appropriate.

## 2023-09-01 RX ORDER — ATORVASTATIN CALCIUM 40 MG/1
40 TABLET, FILM COATED ORAL DAILY
Qty: 90 TABLET | Refills: 1 | Status: SHIPPED | OUTPATIENT
Start: 2023-09-01

## 2023-09-01 NOTE — TELEPHONE ENCOUNTER
Rx Refill Note  Requested Prescriptions     Pending Prescriptions Disp Refills    atorvastatin (LIPITOR) 40 MG tablet 90 tablet 1     Sig: Take 1 tablet by mouth Daily.      Last office visit with prescribing clinician: 6/12/2023   Last telemedicine visit with prescribing clinician: Visit date not found   Next office visit with prescribing clinician: 12/14/2023                         Would you like a call back once the refill request has been completed: [] Yes [] No    If the office needs to give you a call back, can they leave a voicemail: [] Yes [] No    Naheed Luz  09/01/23, 07:55 EDT

## 2023-09-05 ENCOUNTER — TELEPHONE (OUTPATIENT)
Dept: FAMILY MEDICINE CLINIC | Facility: CLINIC | Age: 68
End: 2023-09-05
Payer: MEDICARE

## 2023-09-05 DIAGNOSIS — M54.16 LUMBAR RADICULOPATHY: Primary | ICD-10-CM

## 2023-09-05 NOTE — TELEPHONE ENCOUNTER
----- Message from Nathan Burk sent at 9/5/2023  1:02 PM EDT -----  Regarding: Back pain   Contact: [phone]  Dr. Rush  Good afternoon, it’s Carlos Alberto Burk. The lower back/hip and right leg pain I previously saw Maren in Physical Therapy about has returned. I have not seen her since February and they will not schedule another appointment without first having an order from you. Can you arrange for that? I haven’t injured my back or recently done anything I’m aware of to aggravate it, I believe it’s just time for some adjustments to get me back on track     Thank you  Carlos Alberto Burk

## 2023-11-29 RX ORDER — SCOLOPAMINE TRANSDERMAL SYSTEM 1 MG/1
1 PATCH, EXTENDED RELEASE TRANSDERMAL
Qty: 4 EACH | Refills: 1 | Status: SHIPPED | OUTPATIENT
Start: 2023-11-29

## 2024-01-15 DIAGNOSIS — Z12.5 SCREENING PSA (PROSTATE SPECIFIC ANTIGEN): ICD-10-CM

## 2024-01-15 DIAGNOSIS — E78.00 PURE HYPERCHOLESTEROLEMIA: Chronic | ICD-10-CM

## 2024-01-15 DIAGNOSIS — R71.8 ELEVATED HEMATOCRIT: ICD-10-CM

## 2024-01-15 DIAGNOSIS — R73.01 IMPAIRED FASTING GLUCOSE: Chronic | ICD-10-CM

## 2024-01-16 LAB
ALBUMIN SERPL-MCNC: 4.7 G/DL (ref 3.5–5.2)
ALBUMIN/GLOB SERPL: 3.4 G/DL
ALP SERPL-CCNC: 74 U/L (ref 39–117)
ALT SERPL-CCNC: 24 U/L (ref 1–41)
AST SERPL-CCNC: 16 U/L (ref 1–40)
BASOPHILS # BLD AUTO: 0.05 10*3/MM3 (ref 0–0.2)
BASOPHILS NFR BLD AUTO: 0.6 % (ref 0–1.5)
BILIRUB SERPL-MCNC: 0.4 MG/DL (ref 0–1.2)
BUN SERPL-MCNC: 18 MG/DL (ref 8–23)
BUN/CREAT SERPL: 17 (ref 7–25)
CALCIUM SERPL-MCNC: 10.2 MG/DL (ref 8.6–10.5)
CHLORIDE SERPL-SCNC: 105 MMOL/L (ref 98–107)
CHOLEST SERPL-MCNC: 189 MG/DL (ref 0–200)
CO2 SERPL-SCNC: 26.6 MMOL/L (ref 22–29)
CREAT SERPL-MCNC: 1.06 MG/DL (ref 0.76–1.27)
EGFRCR SERPLBLD CKD-EPI 2021: 76.9 ML/MIN/1.73
EOSINOPHIL # BLD AUTO: 0.04 10*3/MM3 (ref 0–0.4)
EOSINOPHIL NFR BLD AUTO: 0.5 % (ref 0.3–6.2)
ERYTHROCYTE [DISTWIDTH] IN BLOOD BY AUTOMATED COUNT: 12.6 % (ref 12.3–15.4)
GLOBULIN SER CALC-MCNC: 1.4 GM/DL
GLUCOSE SERPL-MCNC: 95 MG/DL (ref 65–99)
HBA1C MFR BLD: 5.8 % (ref 4.8–5.6)
HCT VFR BLD AUTO: 50.3 % (ref 37.5–51)
HDLC SERPL-MCNC: 47 MG/DL (ref 40–60)
HGB BLD-MCNC: 16.4 G/DL (ref 13–17.7)
IMM GRANULOCYTES # BLD AUTO: 0.09 10*3/MM3 (ref 0–0.05)
IMM GRANULOCYTES NFR BLD AUTO: 1.1 % (ref 0–0.5)
LDLC SERPL CALC-MCNC: 121 MG/DL (ref 0–100)
LYMPHOCYTES # BLD AUTO: 2.94 10*3/MM3 (ref 0.7–3.1)
LYMPHOCYTES NFR BLD AUTO: 36.3 % (ref 19.6–45.3)
MCH RBC QN AUTO: 29.6 PG (ref 26.6–33)
MCHC RBC AUTO-ENTMCNC: 32.6 G/DL (ref 31.5–35.7)
MCV RBC AUTO: 90.8 FL (ref 79–97)
MONOCYTES # BLD AUTO: 0.87 10*3/MM3 (ref 0.1–0.9)
MONOCYTES NFR BLD AUTO: 10.7 % (ref 5–12)
NEUTROPHILS # BLD AUTO: 4.11 10*3/MM3 (ref 1.7–7)
NEUTROPHILS NFR BLD AUTO: 50.8 % (ref 42.7–76)
NRBC BLD AUTO-RTO: 0.1 /100 WBC (ref 0–0.2)
PLATELET # BLD AUTO: 240 10*3/MM3 (ref 140–450)
POTASSIUM SERPL-SCNC: 4.4 MMOL/L (ref 3.5–5.2)
PROT SERPL-MCNC: 6.1 G/DL (ref 6–8.5)
PSA SERPL-MCNC: 0.69 NG/ML (ref 0–4)
RBC # BLD AUTO: 5.54 10*6/MM3 (ref 4.14–5.8)
SODIUM SERPL-SCNC: 144 MMOL/L (ref 136–145)
TRIGL SERPL-MCNC: 118 MG/DL (ref 0–150)
VLDLC SERPL CALC-MCNC: 21 MG/DL (ref 5–40)
WBC # BLD AUTO: 8.1 10*3/MM3 (ref 3.4–10.8)

## 2024-01-22 ENCOUNTER — OFFICE VISIT (OUTPATIENT)
Dept: FAMILY MEDICINE CLINIC | Facility: CLINIC | Age: 69
End: 2024-01-22
Payer: MEDICARE

## 2024-01-22 VITALS
HEART RATE: 68 BPM | DIASTOLIC BLOOD PRESSURE: 87 MMHG | OXYGEN SATURATION: 96 % | HEIGHT: 71 IN | WEIGHT: 217 LBS | BODY MASS INDEX: 30.38 KG/M2 | SYSTOLIC BLOOD PRESSURE: 143 MMHG | TEMPERATURE: 97.1 F | RESPIRATION RATE: 16 BRPM

## 2024-01-22 DIAGNOSIS — R73.01 IMPAIRED FASTING GLUCOSE: Chronic | ICD-10-CM

## 2024-01-22 DIAGNOSIS — H69.92 DYSFUNCTION OF LEFT EUSTACHIAN TUBE: ICD-10-CM

## 2024-01-22 DIAGNOSIS — E78.00 PURE HYPERCHOLESTEROLEMIA: Chronic | ICD-10-CM

## 2024-01-22 DIAGNOSIS — Z00.00 MEDICARE ANNUAL WELLNESS VISIT, SUBSEQUENT: Primary | ICD-10-CM

## 2024-01-22 PROCEDURE — G0439 PPPS, SUBSEQ VISIT: HCPCS | Performed by: FAMILY MEDICINE

## 2024-01-22 PROCEDURE — 99214 OFFICE O/P EST MOD 30 MIN: CPT | Performed by: FAMILY MEDICINE

## 2024-01-22 RX ORDER — MELOXICAM 15 MG/1
1 TABLET ORAL DAILY
COMMUNITY
Start: 2023-11-27

## 2024-01-22 NOTE — PROGRESS NOTES
The ABCs of the Annual Wellness Visit  Subsequent Medicare Wellness Visit    Subjective    Nathan Burk is a 68 y.o. male who presents for a Subsequent Medicare Wellness Visit.    The following portions of the patient's history were reviewed and   updated as appropriate: allergies, current medications, past family history, past medical history, past social history, past surgical history, and problem list.    Compared to one year ago, the patient feels his physical   health is the same.    Compared to one year ago, the patient feels his mental   health is the same.    Recent Hospitalizations:  He was not admitted to the hospital during the last year.       Current Medical Providers:  Patient Care Team:  Bud Rush MD as PCP - General (Family Medicine)    Outpatient Medications Prior to Visit   Medication Sig Dispense Refill    meloxicam (MOBIC) 15 MG tablet Take 1 tablet by mouth Daily.      ANORO ELLIPTA 62.5-25 MCG/INH aerosol powder  inhaler Inhale 1 puff Daily.  6    ascorbic acid (VITAMIN C) 1000 MG tablet Take 1 tablet by mouth Daily.      atorvastatin (LIPITOR) 40 MG tablet Take 1 tablet by mouth Daily. 90 tablet 1    azelastine (ASTELIN) 0.1 % nasal spray 1 spray into the nostril(s) as directed by provider Daily. Use in each nostril as directed      CALCIUM-MAGNESIUM-ZINC PO Take 1 tablet by mouth Daily.      cholecalciferol (VITAMIN D3) 10 MCG (400 UNIT) tablet Take 1 tablet by mouth Daily.      Cyanocobalamin (B-12) 1000 MCG capsule Take 1 capsule by mouth Daily.      DUPIXENT 300 MG/2ML solution prefilled syringe Every 14 (Fourteen) Days. LAST DOSE July 4, 2021.  TAKING FOR ALLERGY RELATED SYMTOMS      Flovent  MCG/ACT inhaler Inhale 2 puffs Every Morning.      lansoprazole (PREVACID) 30 MG capsule Take 1 capsule by mouth Daily.      Magnesium 250 MG tablet Take 1 tablet by mouth Daily.      Menaquinone-7 (VITAMIN K2 PO) Take 1 tablet by mouth Every Morning.      montelukast (SINGULAIR) 10  "MG tablet Take 1 tablet by mouth Every Morning.      VITAMIN A PO Take 1 tablet by mouth Daily.      Zinc 50 MG capsule Take 1 capsule by mouth Every Morning.      Loratadine-Pseudoephedrine (CLARITIN-D 12 HOUR PO) Take 1 tablet by mouth Daily.      Scopolamine 1 MG/3DAYS patch Place 1 patch on the skin as directed by provider Every 72 (Seventy-Two) Hours. 4 each 1     Facility-Administered Medications Prior to Visit   Medication Dose Route Frequency Provider Last Rate Last Admin    nitroglycerin (NITROSTAT) SL tablet 0.4 mg  0.4 mg Sublingual Q5 Min PRN Prashanth Moctezuma III, MD           No opioid medication identified on active medication list. I have reviewed chart for other potential  high risk medication/s and harmful drug interactions in the elderly.        Aspirin is not on active medication list.  Aspirin use is not indicated based on review of current medical condition/s. Risk of harm outweighs potential benefits.  .    Patient Active Problem List   Diagnosis    Colon polyps    FH: colon cancer    Pure hypercholesterolemia    Chronic cough    Gastroesophageal reflux disease without esophagitis    Impaired fasting glucose    Colon polyp    Family history of colon cancer    Status post neck surgery, follow-up exam     Advance Care Planning   Advance Care Planning     Advance Directive is not on file.  ACP discussion was held with the patient during this visit. Patient has an advance directive (not in EMR), copy requested.     Objective    Vitals:    01/22/24 1301   BP: 143/87   Pulse: 68   Resp: 16   Temp: 97.1 °F (36.2 °C)   TempSrc: Infrared   SpO2: 96%   Weight: 98.4 kg (217 lb)   Height: 180.3 cm (70.98\")     Estimated body mass index is 30.28 kg/m² as calculated from the following:    Height as of this encounter: 180.3 cm (70.98\").    Weight as of this encounter: 98.4 kg (217 lb).           Does the patient have evidence of cognitive impairment? No    Lab Results   Component Value Date    CHLPL 189 " 01/15/2024    TRIG 118 01/15/2024    HDL 47 01/15/2024     (H) 01/15/2024    VLDL 21 01/15/2024    HGBA1C 5.80 (H) 01/15/2024        HEALTH RISK ASSESSMENT    Smoking Status:  Social History     Tobacco Use   Smoking Status Former    Packs/day: 0.50    Years: 20.00    Additional pack years: 0.00    Total pack years: 10.00    Types: Cigarettes    Start date: 1974    Quit date: 1994    Years since quittin.0   Smokeless Tobacco Never     Alcohol Consumption:  Social History     Substance and Sexual Activity   Alcohol Use Yes    Alcohol/week: 4.0 standard drinks of alcohol    Types: 2 Glasses of wine, 2 Shots of liquor per week    Comment: social 1-2 PER WEEK     Fall Risk Screen:    DIRK Fall Risk Assessment has not been completed.    Depression Screenin/5/2022    11:08 AM   PHQ-2/PHQ-9 Depression Screening   Little Interest or Pleasure in Doing Things 0-->not at all   Feeling Down, Depressed or Hopeless 0-->not at all   PHQ-9: Brief Depression Severity Measure Score 0       Health Habits and Functional and Cognitive Screenin/15/2024    10:39 AM   Functional & Cognitive Status   Do you have difficulty preparing food and eating? No   Do you have difficulty bathing yourself, getting dressed or grooming yourself? No   Do you have difficulty using the toilet? No   Do you have difficulty moving around from place to place? No   Do you have trouble with steps or getting out of a bed or a chair? No   Current Diet Well Balanced Diet   Dental Exam Up to date   Eye Exam Up to date   Exercise (times per week) 0 times per week   Do you need help using the phone?  No   Are you deaf or do you have serious difficulty hearing?  No   Do you need help to go to places out of walking distance? No   Do you need help shopping? No   Do you need help preparing meals?  No   Do you need help with housework?  No   Do you need help with laundry? No   Do you need help taking your medications? No   Do you  need help managing money? No   Do you ever drive or ride in a car without wearing a seat belt? No   Have you felt unusual stress, anger or loneliness in the last month? No   Who do you live with? Spouse   If you need help, do you have trouble finding someone available to you? No   Have you been bothered in the last four weeks by sexual problems? No   Do you have difficulty concentrating, remembering or making decisions? No       Age-appropriate Screening Schedule:  Refer to the list below for future screening recommendations based on patient's age, sex and/or medical conditions. Orders for these recommended tests are listed in the plan section. The patient has been provided with a written plan.    Health Maintenance   Topic Date Due    HEPATITIS C SCREENING  Never done    ANNUAL WELLNESS VISIT  12/05/2023    BMI FOLLOWUP  09/06/2024    COLORECTAL CANCER SCREENING  09/14/2024    LIPID PANEL  01/15/2025    Pneumococcal Vaccine 65+ (3 of 3 - PPSV23 or PCV20) 10/13/2026    TDAP/TD VACCINES (2 - Td or Tdap) 02/08/2028    COVID-19 Vaccine  Completed    INFLUENZA VACCINE  Completed    AAA SCREEN (ONE-TIME)  Completed    ZOSTER VACCINE  Completed                  CMS Preventative Services Quick Reference  Risk Factors Identified During Encounter  Immunizations Discussed/Encouraged: Prevnar 20 (Pneumococcal 20-valent conjugate)  The above risks/problems have been discussed with the patient.  Pertinent information has been shared with the patient in the After Visit Summary.  An After Visit Summary and PPPS were made available to the patient.    Follow Up:   Next Medicare Wellness visit to be scheduled in 1 year.       Additional E&M Note during same encounter follows:  Patient has multiple medical problems which are significant and separately identifiable that require additional work above and beyond the Medicare Wellness Visit.      Chief Complaint  Ear Fullness (Pt is here for wellness visit and go over labs, pt also having  "popping in lrft ear, and having catarac surgery in march)    Subjective        HPI  Nathan Burk is also being seen today for primarily follow-up hyperlipidemia.  His LDL cholesterol slightly higher since last check.  He has not been exercising as much because of some back pain.  Has been followed by orthopedic surgery and the MRI report is pending.  We do not have it on our system that was done outside our system.    Popping in his left ear.  No dizziness.  For few months.  When he moves his ear it seems to make it worse.  No ear pain.  No hearing loss.    Asthma and allergies.  Followed by a allergist.    Impaired fasting glucose.  At this time A1c is at 5.8% with a fairly normal glucose level.  This time no evidence of progression of diabetes or definitive prediabetes.         Objective   Vital Signs:  /87   Pulse 68   Temp 97.1 °F (36.2 °C) (Infrared)   Resp 16   Ht 180.3 cm (70.98\")   Wt 98.4 kg (217 lb)   SpO2 96%   BMI 30.28 kg/m²     Physical Exam  Constitutional:       Appearance: Normal appearance.   HENT:      Head: Atraumatic.      Right Ear: Tympanic membrane, ear canal and external ear normal.      Left Ear: Tympanic membrane, ear canal and external ear normal.      Mouth/Throat:      Pharynx: Oropharynx is clear. No oropharyngeal exudate or posterior oropharyngeal erythema.   Eyes:      Conjunctiva/sclera: Conjunctivae normal.   Neck:      Thyroid: No thyroid mass, thyromegaly or thyroid tenderness.   Cardiovascular:      Rate and Rhythm: Normal rate and regular rhythm.      Pulses: Normal pulses.      Heart sounds: Normal heart sounds.   Pulmonary:      Effort: Pulmonary effort is normal.      Breath sounds: Normal breath sounds.   Abdominal:      General: Abdomen is flat. There is no distension.      Palpations: Abdomen is soft. There is no mass.      Tenderness: There is no abdominal tenderness.      Hernia: No hernia is present.   Musculoskeletal:         General: Normal range of " motion.      Cervical back: Normal range of motion and neck supple. No muscular tenderness.   Lymphadenopathy:      Cervical: No cervical adenopathy.   Skin:     General: Skin is warm and dry.      Findings: No rash.   Neurological:      General: No focal deficit present.      Mental Status: He is alert and oriented to person, place, and time.   Psychiatric:         Mood and Affect: Mood normal.          The following data was reviewed by: Bud Rush MD on 01/22/2024:  Common labs          6/5/2023    09:11 1/15/2024    08:31   Common Labs   Glucose 107  95    BUN 23  18    Creatinine 1.06  1.06    Sodium 142  144    Potassium 5.3  4.4    Chloride 103  105    Calcium 10.1  10.2    Total Protein 6.9  6.1    Albumin 5.0  4.7    Total Bilirubin 0.6  0.4    Alkaline Phosphatase 87  74    AST (SGOT) 31  16    ALT (SGPT) 31  24    WBC 8.3  8.10    Hemoglobin 17.5  16.4    Hematocrit 51.7  50.3    Platelets 222  240    Total Cholesterol 171  189    Triglycerides 103  118    HDL Cholesterol 45  47    LDL Cholesterol  107  121    Hemoglobin A1C 5.7  5.80    PSA  0.689                 Assessment and Plan   Diagnoses and all orders for this visit:    1. Medicare annual wellness visit, subsequent (Primary)    2. Pure hypercholesterolemia  -     Hemoglobin A1c; Future  -     Comprehensive Metabolic Panel; Future  -     Lipid Panel; Future    3. Impaired fasting glucose  -     Hemoglobin A1c; Future    4. Dysfunction of left eustachian tube      Here for annual Medicare wellness visit.    Immunizations.  Prevnar 20 recommended at retail pharmacy otherwise up-to-date.    Hyperlipidemia.  Continue atorvastatin.  LDL up slightly.  Will be rechecking before the next visit.    Impaired fasting glucose.  At risk for diabetes but no progression of prediabetes or diabetes.  Need to monitor.  Check A1c prior to next visit.    Probable eustachian tube dysfunction.  I recommend he get back to his allergist or ENT if symptoms  persist.    Colon cancer screening up-to-date.    Prostate cancer screening, PSA normal.    Other preventative health practices discussed.  He will be seeing his dermatologist soon.  See me in 6 months.  Sooner as needed.             Follow Up   No follow-ups on file.  Patient was given instructions and counseling regarding his condition or for health maintenance advice. Please see specific information pulled into the AVS if appropriate.

## 2024-01-24 ENCOUNTER — TELEPHONE (OUTPATIENT)
Dept: FAMILY MEDICINE CLINIC | Facility: CLINIC | Age: 69
End: 2024-01-24
Payer: MEDICARE

## 2024-01-24 NOTE — TELEPHONE ENCOUNTER
Caller: Nathan Burk    Relationship: Self    Best call back number: [phone]     What was the call regarding: PATIENT STATES HE HAD AN ENT REFERRAL PUT IN FOR AN ENT ([facility], DR LAURA NESS) AND THEY ARE UNABLE TO GET HIM IN UNTIL 3/4/24. PATIENT WOULD NOW LIKE TO KNOW IF ANOTHER REFERRAL COULD BE PUT IN FOR HIM    PLEASE CALL AND ADVISE

## 2024-04-09 ENCOUNTER — TELEPHONE (OUTPATIENT)
Dept: GASTROENTEROLOGY | Facility: CLINIC | Age: 69
End: 2024-04-09
Payer: MEDICARE

## 2024-04-09 ENCOUNTER — PREP FOR SURGERY (OUTPATIENT)
Dept: OTHER | Facility: HOSPITAL | Age: 69
End: 2024-04-09
Payer: MEDICARE

## 2024-04-09 DIAGNOSIS — K63.5 COLON POLYPS: ICD-10-CM

## 2024-04-09 DIAGNOSIS — Z80.0 FH: COLON CANCER: Primary | ICD-10-CM

## 2024-04-09 NOTE — TELEPHONE ENCOUNTER
LAST C/S   9/14/21  IN EPIC     PERSONAL HX OF POLYPS     FAMILY HX OF POLYPS     FAMILY HX OF COLON CA    NO ASA OR BLOOD THINNERS        LIST OF  MEDICATIONS    AZELASTINE  ANORO  SINGULAIR  FLOVENT  ATORVASTATIN  VITAMIN A  [id number]  VITAMIN C  VITAMIN D  VITAMIN E  ZINC            OA QUESTIONNAIRE SCANNED IN MEDIA

## 2024-04-11 ENCOUNTER — TELEPHONE (OUTPATIENT)
Dept: GASTROENTEROLOGY | Facility: CLINIC | Age: 69
End: 2024-04-11
Payer: MEDICARE

## 2024-04-11 NOTE — TELEPHONE ENCOUNTER
RUDY IRIZARRY IN SCHEDULING PT SCHEDULED 09/26/2024 ARRIVING 12NOON [facility] PREP INSTRUCTIONS MAILED TO ADDRESS ON FILE VERIFIED BY THE PT,OK FOR THE HUB TO RELAY

## 2024-04-15 ENCOUNTER — TELEPHONE (OUTPATIENT)
Dept: GASTROENTEROLOGY | Facility: CLINIC | Age: 69
End: 2024-04-15
Payer: MEDICARE

## 2024-04-15 NOTE — TELEPHONE ENCOUNTER
RUDY SEGURA IN SCHEDULING PT HAD TO BE R/S D/T DR THURMAN GOING TO VACATION.ARRIVING 10/29/2024 @7:00AM PT STATES HE STILL HAS PREP INFORMATION PACKET AND WILL UPDATE TO REFLECT TME AN D DATE CHANGES.OK FOR HUB TO DARCY

## 2024-04-17 ENCOUNTER — TELEPHONE (OUTPATIENT)
Dept: GASTROENTEROLOGY | Facility: CLINIC | Age: 69
End: 2024-04-17
Payer: MEDICARE

## 2024-04-17 NOTE — TELEPHONE ENCOUNTER
RUDY SEGURA IN SCHEDULING PT SCHEDULED 09/10/2024 ARRIVING AT 230PM PT VERBALIZES STILL HAS PREP INFO PACK AND WILL UPDATE TO REFLECT NEW TIME AND DATE.OK FOR HUB TO RELAY

## 2024-06-03 RX ORDER — ATORVASTATIN CALCIUM 40 MG/1
40 TABLET, FILM COATED ORAL DAILY
Qty: 90 TABLET | Refills: 1 | Status: SHIPPED | OUTPATIENT
Start: 2024-06-03

## 2024-06-03 NOTE — TELEPHONE ENCOUNTER
Rx Refill Note  Requested Prescriptions     Pending Prescriptions Disp Refills    atorvastatin (LIPITOR) 40 MG tablet [Pharmacy Med Name: ATORVASTATIN 40 MG TABLET] 90 tablet 1     Sig: TAKE 1 TABLET BY MOUTH EVERY DAY      Last office visit with prescribing clinician: 1/22/2024   Last telemedicine visit with prescribing clinician: Visit date not found   Next office visit with prescribing clinician: 8/12/2024                         Would you like a call back once the refill request has been completed: [] Yes [] No    If the office needs to give you a call back, can they leave a voicemail: [] Yes [] No    Naheed Luz  06/03/24, 09:21 EDT

## 2024-06-27 ENCOUNTER — TRANSCRIBE ORDERS (OUTPATIENT)
Dept: ADMINISTRATIVE | Facility: HOSPITAL | Age: 69
End: 2024-06-27
Payer: MEDICARE

## 2024-06-27 ENCOUNTER — HOSPITAL ENCOUNTER (OUTPATIENT)
Dept: CARDIOLOGY | Facility: HOSPITAL | Age: 69
Discharge: HOME | End: 2024-06-27
Payer: MEDICARE

## 2024-06-27 ENCOUNTER — LAB (OUTPATIENT)
Dept: LAB | Facility: HOSPITAL | Age: 69
End: 2024-06-27
Payer: MEDICARE

## 2024-06-27 DIAGNOSIS — R79.1 ABNORMAL COAGULATION PROFILE: ICD-10-CM

## 2024-06-27 DIAGNOSIS — M48.062 LUMBAR STENOSIS WITH NEUROGENIC CLAUDICATION: ICD-10-CM

## 2024-06-27 DIAGNOSIS — M48.062 LUMBAR STENOSIS WITH NEUROGENIC CLAUDICATION: Primary | ICD-10-CM

## 2024-06-27 LAB
ALBUMIN SERPL-MCNC: 4.5 G/DL (ref 3.5–5.2)
ALBUMIN/GLOB SERPL: 1.7 G/DL
ALP SERPL-CCNC: 77 U/L (ref 39–117)
ALT SERPL W P-5'-P-CCNC: 43 U/L (ref 1–41)
ANION GAP SERPL CALCULATED.3IONS-SCNC: 9 MMOL/L (ref 5–15)
APTT PPP: 36.2 SECONDS (ref 22.7–35.4)
AST SERPL-CCNC: 29 U/L (ref 1–40)
BACTERIA UR QL AUTO: ABNORMAL /HPF
BASOPHILS # BLD AUTO: 0.04 10*3/MM3 (ref 0–0.2)
BASOPHILS NFR BLD AUTO: 0.6 % (ref 0–1.5)
BILIRUB SERPL-MCNC: 0.4 MG/DL (ref 0–1.2)
BILIRUB UR QL STRIP: NEGATIVE
BUN SERPL-MCNC: 17 MG/DL (ref 8–23)
BUN/CREAT SERPL: 17.5 (ref 7–25)
CALCIUM SPEC-SCNC: 9.8 MG/DL (ref 8.6–10.5)
CHLORIDE SERPL-SCNC: 103 MMOL/L (ref 98–107)
CLARITY UR: CLEAR
CO2 SERPL-SCNC: 29 MMOL/L (ref 22–29)
COLOR UR: YELLOW
CREAT SERPL-MCNC: 0.97 MG/DL (ref 0.76–1.27)
DEPRECATED RDW RBC AUTO: 43.1 FL (ref 37–54)
EGFRCR SERPLBLD CKD-EPI 2021: 85 ML/MIN/1.73
EOSINOPHIL # BLD AUTO: 0.13 10*3/MM3 (ref 0–0.4)
EOSINOPHIL NFR BLD AUTO: 1.8 % (ref 0.3–6.2)
ERYTHROCYTE [DISTWIDTH] IN BLOOD BY AUTOMATED COUNT: 12.5 % (ref 12.3–15.4)
GLOBULIN UR ELPH-MCNC: 2.7 GM/DL
GLUCOSE SERPL-MCNC: 105 MG/DL (ref 65–99)
GLUCOSE UR STRIP-MCNC: NEGATIVE MG/DL
HCT VFR BLD AUTO: 48.5 % (ref 37.5–51)
HGB BLD-MCNC: 16.1 G/DL (ref 13–17.7)
HGB UR QL STRIP.AUTO: NEGATIVE
HYALINE CASTS UR QL AUTO: ABNORMAL /LPF
IMM GRANULOCYTES # BLD AUTO: 0.04 10*3/MM3 (ref 0–0.05)
IMM GRANULOCYTES NFR BLD AUTO: 0.6 % (ref 0–0.5)
INR PPP: 0.89 (ref 0.9–1.1)
KETONES UR QL STRIP: ABNORMAL
LEUKOCYTE ESTERASE UR QL STRIP.AUTO: ABNORMAL
LYMPHOCYTES # BLD AUTO: 1.82 10*3/MM3 (ref 0.7–3.1)
LYMPHOCYTES NFR BLD AUTO: 25.1 % (ref 19.6–45.3)
MCH RBC QN AUTO: 30.8 PG (ref 26.6–33)
MCHC RBC AUTO-ENTMCNC: 33.2 G/DL (ref 31.5–35.7)
MCV RBC AUTO: 92.9 FL (ref 79–97)
MONOCYTES # BLD AUTO: 0.68 10*3/MM3 (ref 0.1–0.9)
MONOCYTES NFR BLD AUTO: 9.4 % (ref 5–12)
NEUTROPHILS NFR BLD AUTO: 4.54 10*3/MM3 (ref 1.7–7)
NEUTROPHILS NFR BLD AUTO: 62.5 % (ref 42.7–76)
NITRITE UR QL STRIP: NEGATIVE
NRBC BLD AUTO-RTO: 0 /100 WBC (ref 0–0.2)
PH UR STRIP.AUTO: 6 [PH] (ref 5–8)
PLATELET # BLD AUTO: 262 10*3/MM3 (ref 140–450)
PMV BLD AUTO: 9.4 FL (ref 6–12)
POTASSIUM SERPL-SCNC: 4.8 MMOL/L (ref 3.5–5.2)
PROT SERPL-MCNC: 7.2 G/DL (ref 6–8.5)
PROT UR QL STRIP: NEGATIVE
PROTHROMBIN TIME: 12.2 SECONDS (ref 11.7–14.2)
QT INTERVAL: 358 MS
QTC INTERVAL: 436 MS
RBC # BLD AUTO: 5.22 10*6/MM3 (ref 4.14–5.8)
RBC # UR STRIP: ABNORMAL /HPF
REF LAB TEST METHOD: ABNORMAL
SODIUM SERPL-SCNC: 141 MMOL/L (ref 136–145)
SP GR UR STRIP: 1.02 (ref 1–1.03)
SQUAMOUS #/AREA URNS HPF: ABNORMAL /HPF
UROBILINOGEN UR QL STRIP: ABNORMAL
WBC # UR STRIP: ABNORMAL /HPF
WBC NRBC COR # BLD AUTO: 7.25 10*3/MM3 (ref 3.4–10.8)

## 2024-06-27 PROCEDURE — 36415 COLL VENOUS BLD VENIPUNCTURE: CPT

## 2024-06-27 PROCEDURE — 93005 ELECTROCARDIOGRAM TRACING: CPT | Performed by: NEUROLOGICAL SURGERY

## 2024-06-27 PROCEDURE — 85730 THROMBOPLASTIN TIME PARTIAL: CPT

## 2024-06-27 PROCEDURE — 80053 COMPREHEN METABOLIC PANEL: CPT

## 2024-06-27 PROCEDURE — 81001 URINALYSIS AUTO W/SCOPE: CPT

## 2024-06-27 PROCEDURE — 85025 COMPLETE CBC W/AUTO DIFF WBC: CPT

## 2024-06-27 PROCEDURE — 85610 PROTHROMBIN TIME: CPT

## 2024-07-08 ENCOUNTER — HOSPITAL ENCOUNTER (OUTPATIENT)
Dept: GENERAL RADIOLOGY | Facility: HOSPITAL | Age: 69
Discharge: HOME | End: 2024-07-08
Admitting: NEUROLOGICAL SURGERY
Payer: MEDICARE

## 2024-07-08 DIAGNOSIS — Z01.811 PRE-OP CHEST EXAM: ICD-10-CM

## 2024-07-08 PROCEDURE — 71046 X-RAY EXAM CHEST 2 VIEWS: CPT

## 2024-08-12 ENCOUNTER — OFFICE VISIT (OUTPATIENT)
Dept: FAMILY MEDICINE CLINIC | Facility: CLINIC | Age: 69
End: 2024-08-12
Payer: MEDICARE

## 2024-08-12 VITALS
DIASTOLIC BLOOD PRESSURE: 88 MMHG | OXYGEN SATURATION: 98 % | TEMPERATURE: 97.7 F | HEART RATE: 75 BPM | SYSTOLIC BLOOD PRESSURE: 133 MMHG | HEIGHT: 71 IN | WEIGHT: 226.9 LBS | BODY MASS INDEX: 31.77 KG/M2

## 2024-08-12 DIAGNOSIS — E78.00 PURE HYPERCHOLESTEROLEMIA: Primary | Chronic | ICD-10-CM

## 2024-08-12 DIAGNOSIS — R73.01 IMPAIRED FASTING GLUCOSE: Chronic | ICD-10-CM

## 2024-08-12 DIAGNOSIS — Z12.5 SCREENING PSA (PROSTATE SPECIFIC ANTIGEN): ICD-10-CM

## 2024-08-12 PROCEDURE — 1126F AMNT PAIN NOTED NONE PRSNT: CPT | Performed by: FAMILY MEDICINE

## 2024-08-12 PROCEDURE — 99213 OFFICE O/P EST LOW 20 MIN: CPT | Performed by: FAMILY MEDICINE

## 2024-08-12 NOTE — PROGRESS NOTES
"Chief Complaint  Hyperlipidemia    Subjective        Nathan Burk presents to [facility]  History of Present Illness    Annual healthcare maintenance visit.  He continues on atorvastatin.  Lipid panel is overall favorable with an improving LDL.  Has not been exercising much because of his recent back surgery.  He had a lumbar radiculopathy.  He states that is improving.  He is going to physical therapy.  His A1c is up just slightly at 5.9% with history of impaired fasting glucose.    Objective   Vital Signs:  /88   Pulse 75   Temp 97.7 °F (36.5 °C) (Temporal)   Ht 180.3 cm (70.98\")   Wt 103 kg (226 lb 14.4 oz)   SpO2 98%   BMI 31.66 kg/m²   Estimated body mass index is 31.66 kg/m² as calculated from the following:    Height as of this encounter: 180.3 cm (70.98\").    Weight as of this encounter: 103 kg (226 lb 14.4 oz).               Physical Exam  Vitals and nursing note reviewed.   Constitutional:       General: He is not in acute distress.     Appearance: He is well-developed.   Cardiovascular:      Rate and Rhythm: Normal rate and regular rhythm.      Heart sounds: Normal heart sounds.   Pulmonary:      Effort: Pulmonary effort is normal.      Breath sounds: Normal breath sounds.   Skin:     General: Skin is warm and dry.   Psychiatric:         Mood and Affect: Mood normal.        Result Review :    The following data was reviewed by: Bud Rush MD on 08/12/2024:  Common labs          1/15/2024    08:31 6/27/2024    11:36 8/5/2024    09:46   Common Labs   Glucose 95  105  103    BUN 18  17  15    Creatinine 1.06  0.97  0.81    Sodium 144  141  141    Potassium 4.4  4.8  4.6    Chloride 105  103  105    Calcium 10.2  9.8  9.8    Total Protein 6.1   6.5    Albumin 4.7  4.5  4.5    Total Bilirubin 0.4  0.4  0.5    Alkaline Phosphatase 74  77  93    AST (SGOT) 16  29  22    ALT (SGPT) 24  43  24    WBC 8.10  7.25     Hemoglobin 16.4  16.1     Hematocrit 50.3  48.5 "     Platelets 240  262     Total Cholesterol 189   186    Triglycerides 118   212    HDL Cholesterol 47   44    LDL Cholesterol  121   105    Hemoglobin A1C 5.80   5.90    PSA 0.689                     Assessment and Plan     Diagnoses and all orders for this visit:    1. Pure hypercholesterolemia (Primary)  -     Hemoglobin A1c; Future  -     CBC & Differential; Future  -     Comprehensive Metabolic Panel; Future  -     Lipid Panel; Future    2. Impaired fasting glucose  -     Hemoglobin A1c; Future  -     CBC & Differential; Future  -     Comprehensive Metabolic Panel; Future  -     Lipid Panel; Future    3. Screening PSA (prostate specific antigen)  -     PSA Screen; Future    Hyperlipidemia.  Continues atorvastatin.  Will continue to monitor his glucose and A1c.  I will see him back in 6 months.  Sooner as needed.  Medicare wellness visit then.         Follow Up     Return in about 6 months (around 2/12/2025) for Subsequent Medicare Wellness Visit, Lab Visit One Week Prior to Next Appointment.  Patient was given instructions and counseling regarding his condition or for health maintenance advice. Please see specific information pulled into the AVS if appropriate.         Answers submitted by the patient for this visit:  Primary Reason for Visit (Submitted on 8/5/2024)  What is the primary reason for your visit?: Other  Other (Submitted on 8/5/2024)  Please describe your symptoms.: None  Have you had these symptoms before?: No  How long have you been having these symptoms?: Greater than 2 weeks

## 2024-09-10 ENCOUNTER — HOSPITAL ENCOUNTER (OUTPATIENT)
Facility: HOSPITAL | Age: 69
Setting detail: HOSPITAL OUTPATIENT SURGERY
Discharge: HOME | End: 2024-09-10
Attending: INTERNAL MEDICINE | Admitting: INTERNAL MEDICINE
Payer: MEDICARE

## 2024-09-10 ENCOUNTER — ANESTHESIA (OUTPATIENT)
Dept: GASTROENTEROLOGY | Facility: HOSPITAL | Age: 69
End: 2024-09-10
Payer: MEDICARE

## 2024-09-10 ENCOUNTER — ANESTHESIA EVENT (OUTPATIENT)
Dept: GASTROENTEROLOGY | Facility: HOSPITAL | Age: 69
End: 2024-09-10
Payer: MEDICARE

## 2024-09-10 VITALS
DIASTOLIC BLOOD PRESSURE: 87 MMHG | WEIGHT: 221.2 LBS | OXYGEN SATURATION: 92 % | BODY MASS INDEX: 30.87 KG/M2 | RESPIRATION RATE: 18 BRPM | HEART RATE: 66 BPM | SYSTOLIC BLOOD PRESSURE: 154 MMHG

## 2024-09-10 DIAGNOSIS — Z80.0 FH: COLON CANCER: ICD-10-CM

## 2024-09-10 DIAGNOSIS — K63.5 COLON POLYPS: ICD-10-CM

## 2024-09-10 PROCEDURE — 25010000002 PROPOFOL 1000 MG/100ML EMULSION: Performed by: ANESTHESIOLOGY

## 2024-09-10 PROCEDURE — 88305 TISSUE EXAM BY PATHOLOGIST: CPT | Performed by: INTERNAL MEDICINE

## 2024-09-10 PROCEDURE — 25810000003 LACTATED RINGERS PER 1000 ML: Performed by: INTERNAL MEDICINE

## 2024-09-10 RX ORDER — SODIUM CHLORIDE, SODIUM LACTATE, POTASSIUM CHLORIDE, CALCIUM CHLORIDE 600; 310; 30; 20 MG/100ML; MG/100ML; MG/100ML; MG/100ML
1000 INJECTION, SOLUTION INTRAVENOUS CONTINUOUS
Status: DISCONTINUED | OUTPATIENT
Start: 2024-09-10 | End: 2024-09-10 | Stop reason: HOSPADM

## 2024-09-10 RX ORDER — LIDOCAINE HYDROCHLORIDE 20 MG/ML
INJECTION, SOLUTION INFILTRATION; PERINEURAL AS NEEDED
Status: DISCONTINUED | OUTPATIENT
Start: 2024-09-10 | End: 2024-09-10 | Stop reason: SURG

## 2024-09-10 RX ORDER — PROPOFOL 10 MG/ML
INJECTION, EMULSION INTRAVENOUS AS NEEDED
Status: DISCONTINUED | OUTPATIENT
Start: 2024-09-10 | End: 2024-09-10 | Stop reason: SURG

## 2024-09-10 RX ADMIN — SODIUM CHLORIDE, POTASSIUM CHLORIDE, SODIUM LACTATE AND CALCIUM CHLORIDE 1000 ML: 600; 310; 30; 20 INJECTION, SOLUTION INTRAVENOUS at 14:39

## 2024-09-10 RX ADMIN — PROPOFOL INJECTABLE EMULSION 100 MCG/KG/MIN: 10 INJECTION, EMULSION INTRAVENOUS at 15:24

## 2024-09-10 RX ADMIN — LIDOCAINE HYDROCHLORIDE 60 MG: 20 INJECTION, SOLUTION INFILTRATION; PERINEURAL at 15:22

## 2024-09-10 RX ADMIN — PROPOFOL INJECTABLE EMULSION 100 MG: 10 INJECTION, EMULSION INTRAVENOUS at 15:22

## 2024-09-10 NOTE — H&P
[facility]  Pre Procedure History & Physical    Chief Complaint:   Time for my colonoscopy    Subjective     HPI:   69 y.o. male who has a history of colon polyps.  In addition his dad had colon cancer.  His last colonoscopy was in September 2021.    Past Medical History:   Past Medical History:   Diagnosis Date    Allergic     Arthritis     Asthma     ALLERGY INDUCED    Cervical radiculopathy     Colon polyp     Gastroesophageal reflux disease without esophagitis 02/08/2018    Hyperlipidemia     NAFLD (nonalcoholic fatty liver disease) 11/13/2018    Neck pain     Osteoarthritis     Skin cancer of arm, right        Family History:  Family History   Problem Relation Age of Onset    Colon cancer Father     Cancer Father     Diabetes Mother     Malig Hyperthermia Neg Hx        Social History:   reports that he quit smoking about 30 years ago. His smoking use included cigarettes. He started smoking about 50 years ago. He has a 10 pack-year smoking history. He has never used smokeless tobacco. He reports current alcohol use of about 4.0 standard drinks of alcohol per week. He reports that he does not use drugs.    Medications:   Facility-Administered Medications Prior to Admission   Medication Dose Route Frequency Provider Last Rate Last Admin    nitroglycerin (NITROSTAT) SL tablet 0.4 mg  0.4 mg Sublingual Q5 Min Prashanth Luna III, MD         Medications Prior to Admission   Medication Sig Dispense Refill Last Dose    ANORO ELLIPTA 62.5-25 MCG/INH aerosol powder  inhaler Inhale 1 puff Daily.  6 9/10/2024    ascorbic acid (VITAMIN C) 1000 MG tablet Take 1 tablet by mouth Daily.   9/9/2024    atorvastatin (LIPITOR) 40 MG tablet TAKE 1 TABLET BY MOUTH EVERY DAY 90 tablet 1 9/9/2024    azelastine (ASTELIN) 0.1 % nasal spray 1 spray into the nostril(s) as directed by provider Daily. Use in each nostril as directed   9/10/2024    cholecalciferol (VITAMIN D3) 10 MCG (400 UNIT) tablet Take 1 tablet  by mouth Daily.   9/9/2024    Cyanocobalamin (B-12) 1000 MCG capsule Take 1 capsule by mouth Daily.   9/9/2024    DUPIXENT 300 MG/2ML solution prefilled syringe Every 14 (Fourteen) Days. LAST DOSE July 4, 2021.  TAKING FOR ALLERGY RELATED SYMTOMS   9/9/2024    lansoprazole (PREVACID) 30 MG capsule Take 1 capsule by mouth Daily.   9/9/2024    montelukast (SINGULAIR) 10 MG tablet Take 1 tablet by mouth Every Morning.   9/9/2024    VITAMIN A PO Take 1 tablet by mouth Daily.   9/9/2024       Allergies:  Patient has no known allergies.    ROS:    Pertinent items are noted in HPI     Objective     Blood pressure (!) 164/101, pulse 84, resp. rate 16, weight 100 kg (221 lb 3.2 oz), SpO2 94%.    Physical Exam   Constitutional: Pt is oriented to person, place, and time and well-developed, well-nourished, and in no distress.   HENT:   Mouth/Throat: Oropharynx is clear and moist.   Neck: Normal range of motion. Neck supple.   Cardiovascular: Normal rate, regular rhythm and normal heart sounds.    Pulmonary/Chest: Effort normal and breath sounds normal. No respiratory distress. No  wheezes.   Abdominal: Soft. Bowel sounds are normal.   Skin: Skin is warm and dry.   Psychiatric: Mood, memory, affect and judgment normal.     Assessment & Plan     Diagnosis:  69 y.o. male who has a history of colon polyps.  In addition his dad had colon cancer.  His last colonoscopy was in September 2021.    Anticipated Surgical Procedure:  Colonoscopy    The risks, benefits, and alternatives of this procedure have been discussed with the patient or the responsible party- the patient understands and agrees to proceed.    Rik Roldan M.D.

## 2024-09-10 NOTE — ANESTHESIA POSTPROCEDURE EVALUATION
Patient: Nathan Burk    Procedure Summary       Date: 09/10/24 Room / Location: [facility] ENDOSCOPY 6 /  [facility]    Anesthesia Start: 1521 Anesthesia Stop: 1559    Procedure: COLONOSCOPY to cecum with cold biopsy polypectomy, cold biopsies Diagnosis:       FH: colon cancer      Colon polyps      (FH: colon cancer [Z80.0])      (Colon polyps [K63.5])    Surgeons: Rik Roldan MD Provider: Julieta Riddle MD    Anesthesia Type: MAC ASA Status: 3            Anesthesia Type: MAC    Vitals  No vitals data found for the desired time range.          Post Anesthesia Care and Evaluation    Patient location during evaluation: bedside  Patient participation: complete - patient participated  Level of consciousness: awake  Pain management: adequate    Airway patency: patent  Anesthetic complications: No anesthetic complications    Cardiovascular status: acceptable  Respiratory status: acceptable  Hydration status: acceptable

## 2024-09-10 NOTE — ANESTHESIA PREPROCEDURE EVALUATION
Anesthesia Evaluation     Patient summary reviewed and Nursing notes reviewed                Airway   Mallampati: II  TM distance: >3 FB  Neck ROM: limited  Possible difficult intubation  Dental      Pulmonary    (+) a smoker Former, asthma,  Cardiovascular     ECG reviewed  Rhythm: regular  Rate: normal    (+) hyperlipidemia      Neuro/Psych  (+) numbness  GI/Hepatic/Renal/Endo    (+) obesity, GERD, liver disease fatty liver disease    Musculoskeletal     (+) neck pain  Abdominal    Substance History   (+) alcohol use     OB/GYN negative ob/gyn ROS         Other   arthritis,   history of cancer (skin)                Anesthesia Plan    ASA 3     MAC     (S/p ACDF  BMI  I have reviewed the patient's history with the patient and the chart, including all pertinent laboratory results and imaging. I have explained the risks of anesthesia including but not limited to dental damage, corneal abrasion, nerve injury, MI, stroke, and death. Questions asked and answered. Anesthetic plan discussed with patient and team as indicated. Patient expressed understanding of the above.    )  intravenous induction     Anesthetic plan, risks, benefits, and alternatives have been provided, discussed and informed consent has been obtained with: patient.    CODE STATUS:

## 2024-09-12 LAB
CYTO UR: NORMAL
LAB AP CASE REPORT: NORMAL
LAB AP DIAGNOSIS COMMENT: NORMAL
PATH REPORT.FINAL DX SPEC: NORMAL
PATH REPORT.GROSS SPEC: NORMAL

## 2024-09-20 ENCOUNTER — TELEPHONE (OUTPATIENT)
Dept: GASTROENTEROLOGY | Facility: CLINIC | Age: 69
End: 2024-09-20
Payer: MEDICARE

## 2024-09-23 ENCOUNTER — TELEPHONE (OUTPATIENT)
Dept: GASTROENTEROLOGY | Facility: CLINIC | Age: 69
End: 2024-09-23
Payer: MEDICARE

## 2024-09-30 ENCOUNTER — NURSE TRIAGE (OUTPATIENT)
Dept: CALL CENTER | Facility: HOSPITAL | Age: 69
End: 2024-09-30
Payer: MEDICARE

## 2024-09-30 NOTE — TELEPHONE ENCOUNTER
C/o sudden dizziness yesterday. Blood pressure elevated. Felt dizziness again 30-40 minutes ago. /110, 155/90 recheck, now 165/110. Reviewed protocol with patient. Advised to be seen within 24 hours. Attempted to reach Dr. Rush's office x 2, no answer. Advised to go to Urgent Care or ER if unable to see PCP. Patient verbalizes agreement with plan.    Reason for Disposition   Systolic BP  >= 180 OR Diastolic >= 110    Additional Information   Negative: Difficult to awaken or acting confused (e.g., disoriented, slurred speech)   Negative: SEVERE difficulty breathing (e.g., struggling for each breath, speaks in single words)   Negative: [1] Weakness of the face, arm or leg on one side of the body AND [2] new-onset   Negative: [1] Numbness (i.e., loss of sensation) of the face, arm or leg on one side of the body AND [2] new-onset   Negative: [1] Chest pain lasts > 5 minutes AND [2] history of heart disease (i.e., heart attack, bypass surgery, angina, angioplasty, CHF)   Negative: [1] Chest pain AND [2] took nitrogylcerin AND [3] pain was not relieved   Negative: Sounds like a life-threatening emergency to the triager   Negative: Symptom is main concern (e.g., headache, chest pain)   Negative: Low blood pressure is main concern   Negative: [1] Systolic BP  >= 160 OR Diastolic >= 100 AND [2] cardiac (e.g., breathing difficulty, chest pain) or neurologic symptoms (e.g., new-onset blurred or double vision, unsteady gait)   Negative: [1] Pregnant 20 or more weeks (or postpartum < 6 weeks) AND [2] new hand or face swelling   Negative: [1] Pregnant 20 or more weeks (or postpartum < 6 weeks) AND [2] Systolic BP >= 160 OR Diastolic >= 110   Negative: [1] Systolic BP  >= 200 OR Diastolic >= 120 AND [2] having NO cardiac or neurologic symptoms   Negative: [1] Pregnant 20 or more weeks (or postpartum < 6 weeks) AND [2] Systolic BP  >= 140 OR Diastolic >= 90   Negative: [1] Systolic BP  >= 180 OR Diastolic >= 110 AND [2]  "missed most recent dose of blood pressure medication    Answer Assessment - Initial Assessment Questions  1. BLOOD PRESSURE: \"What is the blood pressure?\" \"Did you take at least two measurements 5 minutes apart?\"      174/110  2. ONSET: \"When did you take your blood pressure?\"      30 minutes ago  3. HOW: \"How did you take your blood pressure?\" (e.g., automatic home BP monitor, visiting nurse)      Automatic home BP monitor  4. HISTORY: \"Do you have a history of high blood pressure?\"      No   5. MEDICINES: \"Are you taking any medicines for blood pressure?\" \"Have you missed any doses recently?\"      No   6. OTHER SYMPTOMS: \"Do you have any symptoms?\" (e.g., blurred vision, chest pain, difficulty breathing, headache, weakness)      Dizziness   7. PREGNANCY: \"Is there any chance you are pregnant?\" \"When was your last menstrual period?\"      N/A    Protocols used: Blood Pressure - High-ADULT-AH    "

## 2024-10-01 ENCOUNTER — HOSPITAL ENCOUNTER (EMERGENCY)
Facility: HOSPITAL | Age: 69
Discharge: HOME | End: 2024-10-01
Attending: EMERGENCY MEDICINE
Payer: MEDICARE

## 2024-10-01 ENCOUNTER — OFFICE VISIT (OUTPATIENT)
Dept: FAMILY MEDICINE CLINIC | Facility: CLINIC | Age: 69
End: 2024-10-01
Payer: MEDICARE

## 2024-10-01 ENCOUNTER — APPOINTMENT (OUTPATIENT)
Dept: CT IMAGING | Facility: HOSPITAL | Age: 69
End: 2024-10-01
Payer: MEDICARE

## 2024-10-01 VITALS
BODY MASS INDEX: 31.68 KG/M2 | TEMPERATURE: 97.5 F | HEART RATE: 81 BPM | SYSTOLIC BLOOD PRESSURE: 150 MMHG | OXYGEN SATURATION: 97 % | DIASTOLIC BLOOD PRESSURE: 96 MMHG | HEIGHT: 71 IN | WEIGHT: 226.3 LBS

## 2024-10-01 VITALS
WEIGHT: 225 LBS | DIASTOLIC BLOOD PRESSURE: 92 MMHG | BODY MASS INDEX: 32.21 KG/M2 | TEMPERATURE: 97.8 F | OXYGEN SATURATION: 93 % | HEIGHT: 70 IN | SYSTOLIC BLOOD PRESSURE: 128 MMHG | RESPIRATION RATE: 18 BRPM | HEART RATE: 78 BPM

## 2024-10-01 DIAGNOSIS — R42 VERTIGO: Primary | ICD-10-CM

## 2024-10-01 DIAGNOSIS — I16.0 HYPERTENSIVE URGENCY: ICD-10-CM

## 2024-10-01 DIAGNOSIS — H81.4 VERTIGO OF CENTRAL ORIGIN: Primary | ICD-10-CM

## 2024-10-01 LAB
ALBUMIN SERPL-MCNC: 4.6 G/DL (ref 3.5–5.2)
ALBUMIN/GLOB SERPL: 2 G/DL
ALP SERPL-CCNC: 88 U/L (ref 39–117)
ALT SERPL W P-5'-P-CCNC: 26 U/L (ref 1–41)
ANION GAP SERPL CALCULATED.3IONS-SCNC: 7.4 MMOL/L (ref 5–15)
AST SERPL-CCNC: 23 U/L (ref 1–40)
BASOPHILS # BLD AUTO: 0.03 10*3/MM3 (ref 0–0.2)
BASOPHILS NFR BLD AUTO: 0.4 % (ref 0–1.5)
BILIRUB SERPL-MCNC: 0.5 MG/DL (ref 0–1.2)
BUN SERPL-MCNC: 14 MG/DL (ref 8–23)
BUN/CREAT SERPL: 14.6 (ref 7–25)
CALCIUM SPEC-SCNC: 10.3 MG/DL (ref 8.6–10.5)
CHLORIDE SERPL-SCNC: 101 MMOL/L (ref 98–107)
CO2 SERPL-SCNC: 29.6 MMOL/L (ref 22–29)
CREAT SERPL-MCNC: 0.96 MG/DL (ref 0.76–1.27)
DEPRECATED RDW RBC AUTO: 42.4 FL (ref 37–54)
EGFRCR SERPLBLD CKD-EPI 2021: 86.1 ML/MIN/1.73
EOSINOPHIL # BLD AUTO: 0.05 10*3/MM3 (ref 0–0.4)
EOSINOPHIL NFR BLD AUTO: 0.6 % (ref 0.3–6.2)
ERYTHROCYTE [DISTWIDTH] IN BLOOD BY AUTOMATED COUNT: 12.4 % (ref 12.3–15.4)
GLOBULIN UR ELPH-MCNC: 2.3 GM/DL
GLUCOSE SERPL-MCNC: 109 MG/DL (ref 65–99)
HCT VFR BLD AUTO: 49.5 % (ref 37.5–51)
HGB BLD-MCNC: 16.3 G/DL (ref 13–17.7)
IMM GRANULOCYTES # BLD AUTO: 0.03 10*3/MM3 (ref 0–0.05)
IMM GRANULOCYTES NFR BLD AUTO: 0.4 % (ref 0–0.5)
LYMPHOCYTES # BLD AUTO: 1.63 10*3/MM3 (ref 0.7–3.1)
LYMPHOCYTES NFR BLD AUTO: 20.1 % (ref 19.6–45.3)
MAGNESIUM SERPL-MCNC: 2.3 MG/DL (ref 1.6–2.4)
MCH RBC QN AUTO: 30.2 PG (ref 26.6–33)
MCHC RBC AUTO-ENTMCNC: 32.9 G/DL (ref 31.5–35.7)
MCV RBC AUTO: 91.8 FL (ref 79–97)
MONOCYTES # BLD AUTO: 0.61 10*3/MM3 (ref 0.1–0.9)
MONOCYTES NFR BLD AUTO: 7.5 % (ref 5–12)
NEUTROPHILS NFR BLD AUTO: 5.76 10*3/MM3 (ref 1.7–7)
NEUTROPHILS NFR BLD AUTO: 71 % (ref 42.7–76)
PLATELET # BLD AUTO: 225 10*3/MM3 (ref 140–450)
PMV BLD AUTO: 9.3 FL (ref 6–12)
POTASSIUM SERPL-SCNC: 4.7 MMOL/L (ref 3.5–5.2)
PROT SERPL-MCNC: 6.9 G/DL (ref 6–8.5)
QT INTERVAL: 374 MS
QTC INTERVAL: 407 MS
RBC # BLD AUTO: 5.39 10*6/MM3 (ref 4.14–5.8)
SODIUM SERPL-SCNC: 138 MMOL/L (ref 136–145)
TROPONIN T SERPL HS-MCNC: 17 NG/L
WBC NRBC COR # BLD AUTO: 8.11 10*3/MM3 (ref 3.4–10.8)

## 2024-10-01 PROCEDURE — 93005 ELECTROCARDIOGRAM TRACING: CPT | Performed by: EMERGENCY MEDICINE

## 2024-10-01 PROCEDURE — 85025 COMPLETE CBC W/AUTO DIFF WBC: CPT | Performed by: EMERGENCY MEDICINE

## 2024-10-01 PROCEDURE — 25510000001 IOPAMIDOL PER 1 ML: Performed by: EMERGENCY MEDICINE

## 2024-10-01 PROCEDURE — 80053 COMPREHEN METABOLIC PANEL: CPT | Performed by: EMERGENCY MEDICINE

## 2024-10-01 PROCEDURE — 99284 EMERGENCY DEPT VISIT MOD MDM: CPT | Performed by: EMERGENCY MEDICINE

## 2024-10-01 PROCEDURE — 25010000002 ONDANSETRON PER 1 MG: Performed by: EMERGENCY MEDICINE

## 2024-10-01 PROCEDURE — 83735 ASSAY OF MAGNESIUM: CPT | Performed by: EMERGENCY MEDICINE

## 2024-10-01 PROCEDURE — 99214 OFFICE O/P EST MOD 30 MIN: CPT | Performed by: FAMILY MEDICINE

## 2024-10-01 PROCEDURE — 99285 EMERGENCY DEPT VISIT HI MDM: CPT

## 2024-10-01 PROCEDURE — 84484 ASSAY OF TROPONIN QUANT: CPT | Performed by: EMERGENCY MEDICINE

## 2024-10-01 PROCEDURE — 70496 CT ANGIOGRAPHY HEAD: CPT

## 2024-10-01 PROCEDURE — 93010 ELECTROCARDIOGRAM REPORT: CPT | Performed by: INTERNAL MEDICINE

## 2024-10-01 PROCEDURE — 1126F AMNT PAIN NOTED NONE PRSNT: CPT | Performed by: FAMILY MEDICINE

## 2024-10-01 PROCEDURE — 96374 THER/PROPH/DIAG INJ IV PUSH: CPT

## 2024-10-01 PROCEDURE — 70498 CT ANGIOGRAPHY NECK: CPT

## 2024-10-01 RX ORDER — ONDANSETRON 4 MG/1
4 TABLET, ORALLY DISINTEGRATING ORAL EVERY 6 HOURS PRN
Qty: 20 TABLET | Refills: 0 | Status: SHIPPED | OUTPATIENT
Start: 2024-10-01

## 2024-10-01 RX ORDER — MECLIZINE HYDROCHLORIDE 25 MG/1
25 TABLET ORAL ONCE
Status: COMPLETED | OUTPATIENT
Start: 2024-10-01 | End: 2024-10-01

## 2024-10-01 RX ORDER — ONDANSETRON 2 MG/ML
4 INJECTION INTRAMUSCULAR; INTRAVENOUS ONCE
Status: COMPLETED | OUTPATIENT
Start: 2024-10-01 | End: 2024-10-01

## 2024-10-01 RX ORDER — IOPAMIDOL 755 MG/ML
100 INJECTION, SOLUTION INTRAVASCULAR
Status: COMPLETED | OUTPATIENT
Start: 2024-10-01 | End: 2024-10-01

## 2024-10-01 RX ORDER — MELOXICAM 15 MG/1
1 TABLET ORAL DAILY
COMMUNITY
Start: 2024-09-21

## 2024-10-01 RX ORDER — MECLIZINE HYDROCHLORIDE 25 MG/1
25 TABLET ORAL EVERY 6 HOURS PRN
Qty: 30 TABLET | Refills: 0 | Status: SHIPPED | OUTPATIENT
Start: 2024-10-01

## 2024-10-01 RX ADMIN — IOPAMIDOL 85 ML: 755 INJECTION, SOLUTION INTRAVENOUS at 14:22

## 2024-10-01 RX ADMIN — MECLIZINE HYDROCHLORIDE 25 MG: 25 TABLET ORAL at 14:27

## 2024-10-01 RX ADMIN — ONDANSETRON 4 MG: 2 INJECTION, SOLUTION INTRAMUSCULAR; INTRAVENOUS at 14:28

## 2024-10-01 NOTE — DISCHARGE INSTRUCTIONS
Today we do not see any evidence of stroke or bleeding on your CAT scan.  Likewise on the CT angiography, we do not see any large vessel narrowing or occlusion.  This includes the neck arteries.    We are going to treat your vertigo utilizing a medication called Antivert.  Also did send an additional Zofran as needed.  This is very effective for nausea if needed    Lastly I did send in an ambulatory referral for physical therapy for vertigo.  The office should call and help set this up from the scheduling department.    Regarding your elevated blood pressures, at this time I think the most pertinent course of action is to record your blood pressure twice daily so that your primary care can trend your blood pressure.    We are happy to see you at any time for acutely worsening signs or symptoms.    Please read all of the instructions in this handout.  If you receive prescriptions please fill them and take them as directed.  Please call your primary care physician for follow-up appointment in the next 5 to 7 days.  If you do not have a physician you may call the [facility] referral line at [phone].    You may return to the emergency department at any time for any concerns such as worsening symptoms.  If you received a work or school note it will be printed at the back of this packet.

## 2024-10-01 NOTE — ED NOTES
"Ambulated with pt around nurses station; pt with steady gait ;' reports he feels much better than before but still feels slightly \"woozy\"; md notified   "

## 2024-10-01 NOTE — PROGRESS NOTES
"Chief Complaint  Dizziness (Started Sunday went to urgent care 9/30/24)    Subjective        Nathan Burk presents to [facility]  History of Present Illness    Day 3 of a intermittent but long-lasting vertigo state accompanied by hypertension.  Blood pressure yesterday up to 170/110.  No headache.  He states he has had daily episodes of feeling unsteady.  Like things are spinning.  But not associated with head movements.  Not occurring in bed.  Some trouble with balance.  Some trouble feeling like he is going to pass out.  No visual changes.  No headache.  No chest pain.  No tachycardia or palpitations.  No ear pain.  No ear pressure.  He is not taking his atorvastatin.  He has not been treated for hypertension in the past.  The symptoms will last for hours at a time.  They are not brought on by any particular movement or activity.  They do not occur at nighttime in bed.  Symptoms are mild to moderate.  They are bothersome.  He is concerned about it.  Was seen by urgent care yesterday.  They reportedly recommended he see his primary care doctor.    Objective   Vital Signs:  /96   Pulse 81   Temp 97.5 °F (36.4 °C) (Temporal)   Ht 180.3 cm (70.98\")   Wt 103 kg (226 lb 4.8 oz)   SpO2 97%   BMI 31.58 kg/m²   Estimated body mass index is 31.58 kg/m² as calculated from the following:    Height as of this encounter: 180.3 cm (70.98\").    Weight as of this encounter: 103 kg (226 lb 4.8 oz).          Physical Exam  Constitutional:       Comments: He looks tired.   HENT:      Head: Atraumatic.   Eyes:      Extraocular Movements: Extraocular movements intact.      Pupils: Pupils are equal, round, and reactive to light.   Cardiovascular:      Rate and Rhythm: Normal rate and regular rhythm.   Pulmonary:      Effort: Pulmonary effort is normal.      Breath sounds: Normal breath sounds.   Musculoskeletal:      Cervical back: Normal range of motion and neck supple.   Neurological:     "  Comments: Pupils are equal and reactive to light.  Extraocular muscle intact all directions.  Negative Shayna-Hallpike.  No nystagmus.  But he does feel some symptoms when he goes from a lying to sitting position.  He is gait is unsteady with heel-to-toe.  But no ataxia.  No dysmetria.  Good heel-to-toe.  Good finger-to-nose.  DTRs are normal.  Strength is normal upper and lower extremities.  Face is symmetric.     Repeat blood pressure 150/96.     Result Review :                Assessment and Plan   Diagnoses and all orders for this visit:    1. Vertigo of central origin (Primary)    2. Hypertensive urgency    Concerning combination of probable hypertensive urgency and possible central vertigo.  Could be peripheral vertigo such as vestibulitis.  However needs ASAP evaluation in the emergency room to rule out causes of central vertigo.  He has a family member that can drive him.  At this time there is no immediate indication for activating the 911 system.  Needs CT scan if negative probable angiography either MRI or CT.  I will see him for follow-up afterwards.  Pending test results.  Our staff will give report to ER triage.          Follow Up   No follow-ups on file.  Patient was given instructions and counseling regarding his condition or for health maintenance advice. Please see specific information pulled into the AVS if appropriate.

## 2024-10-01 NOTE — ED NOTES
"Pt presents to facility with reports of dizziness episode on Sunday. PT reports he thought it was his blood sugar, sat down ate a banana and reports it got better after 20 minutes. PT reports this happened again yesterday and he checked his BP at that time and it was 177/116 and after 30 minutes came down to the 150's. PT went to  yesterday, was seen and discharged and sent to PCP office for follow up today. Upon arrival to PCP they sent him to the ER after he says the dizziness is still there and \"comes and goes\".     Pricila Brown RN    "

## 2024-10-01 NOTE — FSED PROVIDER NOTE
EMERGENCY DEPARTMENT ENCOUNTER    Room Number:  05/05  Date seen:  10/1/2024  Time seen: 13:18 EDT  PCP: Bud Rush MD  Historian:     Discussed/obtained information from independent historians:     HPI:    The patient is a 69-year-old male.  He presents with a 3-day history of intermittent episodes of dizziness and vertigo.  He states that started on Sunday.  He was standing when he had approximate 20-minute episode of dizziness.  He describes it as unsteadiness with the feeling of presyncope.  No associated headache.  No chest pain no palpitations.  No nausea vomiting or diaphoresis.  He sat down, ate a banana, and the episode resolved in about 20 minutes.  He had another episode the following day.  Today he states he had a third episode and has had some persistent generalized weakness since that time.  No facial palsy, no change in vision or speech, no focal motor or sensory deficits in his extremities.  Gait is intact.  There is been no trauma.  Again no palpitations chest pain or shortness of breath.    External (non-ED) record review:        Chronic or social conditions impacting care:    ALLERGIES  Patient has no known allergies.    PAST MEDICAL HISTORY  Active Ambulatory Problems     Diagnosis Date Noted    Colon polyps 08/14/2017    FH: colon cancer 08/14/2017    Pure hypercholesterolemia 02/08/2018    Chronic cough 02/08/2018    Gastroesophageal reflux disease without esophagitis 02/08/2018    Impaired fasting glucose 05/07/2018    Colon polyp 12/08/2020    Family history of colon cancer 12/08/2020    Status post neck surgery, follow-up exam 07/23/2021     Resolved Ambulatory Problems     Diagnosis Date Noted    NAFLD (nonalcoholic fatty liver disease) 11/13/2018    Herniation of cervical intervertebral disc with radiculopathy 07/08/2021     Past Medical History:   Diagnosis Date    Allergic     Arthritis     Asthma     Cervical radiculopathy     Hyperlipidemia     Neck pain      Osteoarthritis     Skin cancer of arm, right        PAST SURGICAL HISTORY  Past Surgical History:   Procedure Laterality Date    ANTERIOR CERVICAL DISCECTOMY W/ FUSION Bilateral 2021    Procedure: Anterior cervical discectomy cervical 5 cervical 6 with allograft (cadaver) bone fusion and titanium instrumentation;  Surgeon: Zeus Robertson MD;  Location: [facility] MAIN OR;  Service: Neurosurgery;  Laterality: Bilateral;    BACK SURGERY  2024    COLONOSCOPY N/A 2017    Procedure: COLONOSCOPY to cecum and t.i.;  Surgeon: Rik Roldan MD;  Location: [facility] ENDOSCOPY;  Service:     COLONOSCOPY N/A 2021    Procedure: COLONOSCOPY INTO CECUM AND T.I.;  Surgeon: Rik Roldan MD;  Location: [facility] ENDOSCOPY;  Service: Gastroenterology;  Laterality: N/A;  PRE- FAMILY HISTORY OF COLON CANCER, PERSONAL HISTORY OF COLON POLYPS  POST- DIVERTICULOSIS, INTERNAL HEMORRHOIDS    COLONOSCOPY N/A 9/10/2024    Procedure: COLONOSCOPY to cecum with cold biopsy polypectomy, cold biopsies;  Surgeon: Rik Roldan MD;  Location: [facility] ENDOSCOPY;  Service: Gastroenterology;  Laterality: N/A;  pre: hx colon polyps, family hx colon cancer  post: polyps, hemorrhoids, lipomatous ileocecal valve    FRACTURE SURGERY      left foot    HERNIA REPAIR      KNEE MENISCAL REPAIR Right 2018    KNEE SURGERY  2018    right knee    SINUS SURGERY      TONSILLECTOMY      TOTAL KNEE ARTHROPLASTY Bilateral 2019    VASECTOMY         FAMILY HISTORY  Family History   Problem Relation Age of Onset    Colon cancer Father     Cancer Father     Diabetes Mother     Malig Hyperthermia Neg Hx        SOCIAL HISTORY  Social History     Socioeconomic History    Marital status:    Tobacco Use    Smoking status: Former     Current packs/day: 0.00     Average packs/day: 0.5 packs/day for 20.0 years (10.0 ttl pk-yrs)     Types: Cigarettes     Start date: 1974     Quit date: 1994     Years since quittin.7    Smokeless tobacco:  Never   Vaping Use    Vaping status: Never Used   Substance and Sexual Activity    Alcohol use: Yes     Alcohol/week: 4.0 standard drinks of alcohol     Types: 2 Glasses of wine, 2 Shots of liquor per week     Comment: social 1-2 PER WEEK    Drug use: No    Sexual activity: Defer       REVIEW OF SYSTEMS  Review of Systems    All systems reviewed and negative except for those discussed in HPI.       PHYSICAL EXAM    I have reviewed the triage vital signs and nursing notes.  Vitals:    10/01/24 1530   BP: 128/92   Pulse: 78   Resp:    Temp:    SpO2:      Physical Exam    Vital signs: [Reviewed in nurses notes]    General: [Patient is awake alert no acute distress]    HEENT: Normocephalic atraumatic nasopharynx clear.  Oropharynx clear and moist    Neck:   Supple, no bruits noted    Respiratory:   [Nonlabored respirations.  Clear to auscultation bilaterally.  Equal breath sounds bilaterally.  No wheezes or stridor noted.]    Cardiovascular: [ Regular rate and rhythm.  No murmur.  Equal pulses in bilateral lower extremities without edema.]    Abdomen:  [ Nondistended ]    Skin:   [warm and dry.  No rashes noted]    Neurological examination: Patient is awake alert oriented x 4.  Speech is normal.  No facial palsy.  Strength is 5 over 5 x 4 extremities.  Sensation intact as were extremities.  No pronator drift noted.  No visual field cuts bilaterally.  Patient does have some dizziness symptoms  head turning  LAB RESULTS  Recent Results (from the past 24 hour(s))   ECG 12 Lead Stroke Evaluation    Collection Time: 10/01/24  1:45 PM   Result Value Ref Range    QT Interval 374 ms    QTC Interval 407 ms   Comprehensive Metabolic Panel    Collection Time: 10/01/24  1:56 PM    Specimen: Blood   Result Value Ref Range    Glucose 109 (H) 65 - 99 mg/dL    BUN 14 8 - 23 mg/dL    Creatinine 0.96 0.76 - 1.27 mg/dL    Sodium 138 136 - 145 mmol/L    Potassium 4.7 3.5 - 5.2 mmol/L    Chloride 101 98 - 107 mmol/L    CO2 29.6 (H) 22.0 -  29.0 mmol/L    Calcium 10.3 8.6 - 10.5 mg/dL    Total Protein 6.9 6.0 - 8.5 g/dL    Albumin 4.6 3.5 - 5.2 g/dL    ALT (SGPT) 26 1 - 41 U/L    AST (SGOT) 23 1 - 40 U/L    Alkaline Phosphatase 88 39 - 117 U/L    Total Bilirubin 0.5 0.0 - 1.2 mg/dL    Globulin 2.3 gm/dL    A/G Ratio 2.0 g/dL    BUN/Creatinine Ratio 14.6 7.0 - 25.0    Anion Gap 7.4 5.0 - 15.0 mmol/L    eGFR 86.1 >60.0 mL/min/1.73   Single High Sensitivity Troponin T    Collection Time: 10/01/24  1:56 PM    Specimen: Blood   Result Value Ref Range    HS Troponin T 17 <22 ng/L   Magnesium    Collection Time: 10/01/24  1:56 PM    Specimen: Blood   Result Value Ref Range    Magnesium 2.3 1.6 - 2.4 mg/dL   CBC Auto Differential    Collection Time: 10/01/24  1:56 PM    Specimen: Blood   Result Value Ref Range    WBC 8.11 3.40 - 10.80 10*3/mm3    RBC 5.39 4.14 - 5.80 10*6/mm3    Hemoglobin 16.3 13.0 - 17.7 g/dL    Hematocrit 49.5 37.5 - 51.0 %    MCV 91.8 79.0 - 97.0 fL    MCH 30.2 26.6 - 33.0 pg    MCHC 32.9 31.5 - 35.7 g/dL    RDW 12.4 12.3 - 15.4 %    RDW-SD 42.4 37.0 - 54.0 fl    MPV 9.3 6.0 - 12.0 fL    Platelets 225 140 - 450 10*3/mm3    Neutrophil % 71.0 42.7 - 76.0 %    Lymphocyte % 20.1 19.6 - 45.3 %    Monocyte % 7.5 5.0 - 12.0 %    Eosinophil % 0.6 0.3 - 6.2 %    Basophil % 0.4 0.0 - 1.5 %    Immature Grans % 0.4 0.0 - 0.5 %    Neutrophils, Absolute 5.76 1.70 - 7.00 10*3/mm3    Lymphocytes, Absolute 1.63 0.70 - 3.10 10*3/mm3    Monocytes, Absolute 0.61 0.10 - 0.90 10*3/mm3    Eosinophils, Absolute 0.05 0.00 - 0.40 10*3/mm3    Basophils, Absolute 0.03 0.00 - 0.20 10*3/mm3    Immature Grans, Absolute 0.03 0.00 - 0.05 10*3/mm3       Ordered the above labs and independently interpreted results.  My findings will be discussed in the ED course or medical decision making section below      PROCEDURES:  ECG 12 Lead      Date/Time: 10/1/2024 1:45 PM    Performed by: Junaid Rock MD  Authorized by: Junaid Rock MD  Interpreted by ED  physician  Rhythm: sinus rhythm  Comments: Normal sinus rhythm rate of 71.  Left anterior fascicular block noted.  Poor interaural wave progression noted.  He does have some nonspecific T wave inversions on 3.          RADIOLOGY RESULTS  CT Angiogram Head, CT Angiogram Neck    Result Date: 10/1/2024  CT ANGIOGRAM NECK AND HEAD WITH CONTRAST  HISTORY: Dizziness, presyncope.  COMPARISON: None.  FINDINGS: A noncontrasted CT examination of the brain was performed. The brain and ventricles are symmetrical. There is no evidence of intracranial hemorrhage, hydrocephalus or of acute infarction. Mild vascular calcification is appreciated. There is mild mucosal thickening involving the ethmoid air cells on the right anteriorly. There is minimal mucosal thickening involving the maxillary sinuses. A CT angiogram of the neck and head was then performed. Multiplanar as well as 3-dimensional reconstructions were generated.  There is a bovine configuration of the great vessels. There is mild calcified plaque appreciated involving the carotid bifurcations bilaterally with 0% stenosis using NASCET criteria. The internal carotid arteries have a medial course. Venous contamination limits evaluation of the carotid siphons. The distal aspects of the internal carotid arteries and the proximal aspects of the anterior and middle cerebral arteries appear unremarkable.  Both vertebral arteries were opacified. The left vertebral artery is minimally larger than that of the right. The basilar artery and the proximal aspects of the posterior cerebral arteries appear unremarkable. The posterior communicating artery on the left is markedly hypoplastic. The posterior communicating artery on the right is markedly hypoplastic or atretic.  Sagittal reconstructions demonstrate anterior cervical fusion from C5-C6 with anterior plate and screws. There is fusion of the facets to the right at C2/C3.      1.  There is no evidence of an acute infarct or of  intracranial hemorrhage. Further evaluation could be performed with a MRI examination of the brain. 2.  There is no evidence of carotid stenosis. There is no evidence of a proximal intracranial arterial high-grade stenosis or occlusion. There is relative isolation of the posterior circulation. The left posterior communicating artery is markedly hypoplastic and the right posterior communicating artery is markedly hypoplastic or atretic.  NOTE: This is a preliminary report. The 3-dimensional reconstructions are not yet available for review.    Radiation dose reduction techniques were utilized, including automated exposure control and exposure modulation based on body size.         Ordered the above noted radiological studies.  Independently interpreted by me.  My findings will be discussed in the medical decision section below.     PROGRESS, DATA ANALYSIS, CONSULTS AND MEDICAL DECISION MAKING    Please note that this section constitutes my independent interpretation of clinical data including lab results, radiology, EKG's.  This constitutes my independent professional opinion regarding differential diagnosis and management of this patient.  It may include any factors such as history from outside sources, review of external records, social determinants of health, management of medications, response to those treatments, and discussions with other providers.    ED Course as of 10/01/24 1621   Tue Oct 01, 2024   1603 Patient feeling much better at this time.  CTA of head and neck reviewed.  All labs reviewed.    Patient feels better than he did at this time after treatment.  Plan: Will place on meclizine and also place outpatient referral for vestibular physical therapy [TC]      ED Course User Index  [TC] Junaid Rock MD     Orders placed during this visit:  Orders Placed This Encounter   Procedures    CT Angiogram Head    CT Angiogram Neck    Comprehensive Metabolic Panel    Single High Sensitivity Troponin T     Magnesium    CBC Auto Differential    Ambulatory Referral to Physical Therapy for Evaluation & Treatment    Orthostatic Vitals (Blood Pressure & Heart Rate)    ECG 12 Lead Stroke Evaluation    ECG 12 Lead    CBC & Differential    ED Acknowledgement Form Needed;       Medications   ondansetron (ZOFRAN) injection 4 mg (4 mg Intravenous Given 10/1/24 1428)   meclizine (ANTIVERT) tablet 25 mg (25 mg Oral Given 10/1/24 1427)   iopamidol (ISOVUE-370) 76 % injection 100 mL (85 mL Intravenous Given 10/1/24 1422)            Medical Decision Making  Problems Addressed:  Vertigo: complicated acute illness or injury    Amount and/or Complexity of Data Reviewed  Labs: ordered.  Radiology: ordered.  ECG/medicine tests: ordered and independent interpretation performed.    Risk  Prescription drug management.            DIAGNOSIS  Final diagnoses:   Vertigo          Medication List        New Prescriptions      meclizine 25 MG tablet  Commonly known as: ANTIVERT  Take 1 tablet by mouth Every 6 (Six) Hours As Needed for Dizziness.     ondansetron ODT 4 MG disintegrating tablet  Commonly known as: ZOFRAN-ODT  Place 1 tablet on the tongue Every 6 (Six) Hours As Needed for Vomiting or Nausea.               Where to Get Your Medications        These medications were sent to [facility]/pharmacy #[id number] - [place], [place] - [address] [facility] AT [facility] - [phone]  - [phone]   [address] [facility]], [place] [address]      Phone: [phone]   meclizine 25 MG tablet  ondansetron ODT 4 MG disintegrating tablet         FOLLOW-UP  Bud Rush MD  [address]  [place] [address]  [phone]    In 1 week          Latest Documented Vital Signs:  As of 16:21 EDT  BP- 128/92 HR- 78 Temp- 97.8 °F (36.6 °C) (Tympanic) O2 sat- 93%    Appropriate PPE utilized throughout this patient encounter to include mask, if indicated, per current protocol. Hand hygiene was performed before donning PPE and after removal  when leaving the room.    Please note that portions of this were completed with a voice recognition program.     Note Disclaimer: At [facility], we believe that sharing information builds trust and better relationships. You are receiving this note because you are receiving care at [facility] or recently visited. It is possible you will see health information before a provider has talked with you about it. This kind of information can be easy to misunderstand. To help you fully understand what it means for your health, we urge you to discuss this note with your provider.

## 2024-10-03 ENCOUNTER — TREATMENT (OUTPATIENT)
Dept: PHYSICAL THERAPY | Facility: CLINIC | Age: 69
End: 2024-10-03
Payer: MEDICARE

## 2024-10-03 DIAGNOSIS — R42 DIZZINESS: Primary | ICD-10-CM

## 2024-10-03 DIAGNOSIS — M43.6 NECK STIFFNESS: ICD-10-CM

## 2024-10-03 NOTE — PROGRESS NOTES
Physical Therapy Initial Evaluation and Plan of Care  [facility] Physical Therapy [address], [address]  [place] [address]     Patient: Nathan Burk   : 1955  Referring practitioner: Junaid Rock MD  Date of Initial Visit: 10/3/2024  Today's Date: 10/3/2024  Patient seen for 1 sessions  PT Clinic location: [address], [address] [place] [address].  [id number]          Visit Diagnoses:    ICD-10-CM ICD-9-CM   1. Dizziness  R42 780.4   2. Neck stiffness  M43.6 723.5       Subjective Questionnaire: DHI: 22%    Subjective Evaluation    History of Present Illness  Mechanism of injury: Pt presents ~4 days following onset of dizziness. He note sthat it came on suddenly and quite severe symptoms. He was standing in the kitchen ~1pm and felt like he was going to pass out, needing to hang on to furniture to get himself to the couch.   He initially thought that he had low blood sugar or BP or dehydration.  After around 20 minutes it seemed to resolve.    The next morning he had another episode, much less severe, lasting around 20 minutes again.    He took his BP which was really high. He couldn't get in to his PCP, so went to  at [facility].  The next day it came back again but lasted much longer, still less severe than the first episode. He went to PCP who referred to ED for further workup.    CT, ECG, blood work etc all negative for significant findings, with dizziness present when he was in the ED. He was prescribed meclizine with some good relief of symptoms but still some abnormal sensations present.    He notes that currently being up and walking around seems to aggravate his symptoms a little, eased with static sitting.    He describes the symptoms as lightheaded, like feeling drunk, detached, heaviness of his eyes like if he had allergies. He denies spinning sensation.     He has allergies, takes Allegra D regularly. He drinks 50-60 oz of water  daily.    Medical history: Hx of C5/6 ACDF, IFG, allergy related asthma, B TKA, Jul 11 2024 lumbar decompressions Dr Delaney. See chart for further detail.   Therapy Precautions: N/A        Objective          Palpation   Left   Tenderness of the cervical interspinals, scalenes, sternocleidomastoid and suboccipitals.     Right Tenderness of the cervical interspinals, scalenes, sternocleidomastoid and suboccipitals.     Active Range of Motion     Additional Active Range of Motion Details  Ext 40% - dizziness with prolonged hold increasing with duration (denies other VBI signs)  Flex 80%   L rot 60% - dizziness with prolonged hold increasing with duration (denies other VBI signs)  R rot 90%- minor dizziness, not worsened with duration (denies other VBI signs)  L SB 80% - mild dizziness, not worsened with duration (denies other VBI signs)  R SB 70%    Passive Range of Motion     Additional Passive Range of Motion Details  Moderate B C2-7 hypomobility with mild discomfort on UPA assessment    Functional Assessment     Comments  Visual Fields - WNL  Smooth pursuit - WNL  Saccadic motion - WNL    VOR (head shake/thrust) - mild dizziness, occasional slight loss of visual fixation  VOR cancellation - no change in symptoms    CDDT - negative    Fenton Hallpike   L - WNL  R - WNL    Horizontal roll test supine  L - WNL  R - WNL            Assessment & Plan       Assessment  Impairments: abnormal gait, abnormal or restricted ROM, impaired balance, lacks appropriate home exercise program and safety issue   Functional limitations: walking, standing and unable to perform repetitive tasks   Assessment details: The patient is a 69 y.o. male who presents to physical therapy today for complaints of dizziness. Upon initial evaluation, the patient demonstrates the following impairments: decreased cervical ROM and joint mobility, tenderness of cervical musculature, positive dizziness response to VOR testing and cervical movements. Examination  findings indicate possible cervicogenic dizziness.     Due to these impairments, the patient is unable to perform or has difficulty with the following functional tasks: standing, walking. The patient would benefit from skilled PT services to address functional limitations and impairments and to improve patient quality of life.      Prognosis: good    Goals  Plan Goals: ST. Pt will be independent and compliant with initial HEP in 2 weeks.  2. Pt will demonstrate good safety awareness & decision making to reduce falls risk in 2 weeks.  3. Pt will report minimal dizziness with cervical rotation AROM testing in 4 weeks.    LT. Pt will be independent with final HEP for self-management of condition by DC.  2. Pt will improve DHI score to <10% to indicate improved function with less dizziness by DC  3. Pt will demonstrate cervical AROM > 60% without dizziness by DC to improve tolerance to functional activities.   4. Pt will demonstrate no dizziness during VOR/vestibular testing by DC to indicate improved safety with functional movements such as driving.    Plan  Therapy options: will be seen for skilled therapy services  Planned modality interventions: cryotherapy and thermotherapy (hydrocollator packs)  Planned therapy interventions: abdominal trunk stabilization, ADL retraining, balance/weight-bearing training, body mechanics training, flexibility, functional ROM exercises, gait training, home exercise program, joint mobilization, manual therapy, motor coordination training, neuromuscular re-education, postural training, soft tissue mobilization, spinal/joint mobilization, strengthening, stretching, therapeutic activities and transfer training  Frequency: 2x week  Duration in weeks: 8  Treatment plan discussed with: patient        See flowsheets for treatment detail.  Education: POC, pathoanatomy, rationale, HEP, possible cervicogenic dizziness    History # of Personal Factors and/or Comorbidities: HIGH  (3+)  Examination of Body System(s): # of elements: MODERATE (3)  Clinical Presentation: EVOLVING  Clinical Decision Making: MODERATE      Timed:         Manual Therapy:         mins  06767;     Therapeutic Exercise:         mins  85705;     Neuromuscular Asif:    10    mins  11280;    Therapeutic Activity:     10     mins  46634;     Gait Training:           mins  63345;     Ultrasound:          mins  61023;    Ionto                                   mins   29522  Self Care                       20     mins   33061      Un-Timed:  Electrical Stimulation:         mins  52438 ( );  Dry Needling          mins self-pay  Traction          mins 06223  Low Eval          Mins  64502  Mod Eval     25     Mins  37836  High Eval                            Mins  09665  Re-Eval                               mins  20717      Timed Treatment:   40   mins   Total Treatment:     65   mins    PT SIGNATURE: Ra Peterson PT   [place] [facility] license #: [id number]  DATE TREATMENT INITIATED: 10/3/2024    Initial Certification  Certification Period: 12/31/2024  I certify that the therapy services are furnished while this patient is under my care.  The services outlined above are required by this patient, and will be reviewed every 90 days.    PHYSICIAN: Junaid Rock MD  NPI: [id number]                                      DATE:     Please sign and return via fax to [place] - Fax #: [phone]. Thank you, [facility].

## 2024-10-16 ENCOUNTER — TREATMENT (OUTPATIENT)
Dept: PHYSICAL THERAPY | Facility: CLINIC | Age: 69
End: 2024-10-16
Payer: MEDICARE

## 2024-10-16 DIAGNOSIS — R42 DIZZINESS: Primary | ICD-10-CM

## 2024-10-16 DIAGNOSIS — M43.6 NECK STIFFNESS: ICD-10-CM

## 2024-10-16 NOTE — PROGRESS NOTES
Physical Therapy Daily Treatment Note  [facility] Physical Therapy [facility], [address], [place] [address]    Patient: Nathan Burk  : 1955  Referring practitioner: Junaid Rock MD  Today's Date: 10/16/2024    VISIT#: 2    Visit Diagnoses:    ICD-10-CM ICD-9-CM   1. Dizziness  R42 780.4   2. Neck stiffness  M43.6 723.5       Subjective   Nathan Burk reports: that he's had much less dizziness since his evaluation at PT. He hasn't been taking meclizine as frequently, and went several days without taking it without feeling dizzy. He's only had some minor intermittent dizziness since then and taken a few pills to ease his symptoms.      Objective     See Exercise, Manual, and Modality Logs for complete treatment.     Patient Education: HEP update      Assessment/Plan  Pt tolerated session well today with no reports of dizziness during session. New upper thoracic and cervical mobility tasks were performed effectively after education and cuing, without provocation of symptoms. Discussed rationale and updated HEP with new tasks. Plan to progress as tolerated next session.    Progress per Plan of Care        Timed:         Manual Therapy:         mins  03546;     Therapeutic Exercise:    8     mins  03407;     Neuromuscular Asif:    12    mins  35435;    Therapeutic Activity:     10     mins  48562;   w/ education on progressions  Gait Training:           mins  24918;     Ultrasound:          mins  60100;    Ionto:                                   mins  96968  Self Care:                            mins  11443    Un-Timed:  Canalith repositioning: ___ mins 36088  Electrical Stimulation:         mins  76991 (MC );  Dry Needling          mins self-pay  Traction          mins 69690  Re-Eval                               mins  47446  Group Therapy           ___ mins 60827    Timed Treatment:   30   mins   Total Treatment:     30   mins    Ra Peterson  PT  Physical Therapist  [place] [place] license #: [id number]

## 2024-10-18 ENCOUNTER — TREATMENT (OUTPATIENT)
Dept: PHYSICAL THERAPY | Facility: CLINIC | Age: 69
End: 2024-10-18
Payer: MEDICARE

## 2024-10-18 DIAGNOSIS — R42 DIZZINESS: Primary | ICD-10-CM

## 2024-10-18 DIAGNOSIS — M43.6 NECK STIFFNESS: ICD-10-CM

## 2024-10-18 NOTE — PROGRESS NOTES
Physical Therapy Daily Treatment Note  [facility] Physical Therapy [facility], [address], [place] [address]    Patient: Nathan Burk  : 1955  Referring practitioner: Junaid Rock MD  Today's Date: 10/18/2024    VISIT#: 3    Visit Diagnoses:    ICD-10-CM ICD-9-CM   1. Dizziness  R42 780.4   2. Neck stiffness  M43.6 723.5       Subjective   Nathan Burk reports: that he still hasn't taken any of his dizziness medications, and that his dizziness hasn't increased at all. He still feels a lot less dizziness since starting PT exercises.      Objective     See Exercise, Manual, and Modality Logs for complete treatment.     Patient Education: HEP update      Assessment/Plan  Pt noted some dizziness provoked today with rapid gaze stabilization tasks, easing with short seated rest period. He tolerated new neck stretches well without issue. Added [facility] open books with pt noting minor dizziness and moderate fatigue in shoulder and neck muscles, easing with rest period.    Progress per Plan of Care        Timed:         Manual Therapy:         mins  36750;     Therapeutic Exercise:    10     mins  38104;     Neuromuscular Asif:    10    mins  80802;    Therapeutic Activity:     10     mins  89196;     Gait Training:           mins  75758;     Ultrasound:          mins  47514;    Ionto:                                   mins  49383  Self Care:                            mins  51941    Un-Timed:  Canalith repositioning: ___ mins 89818  Electrical Stimulation:         mins  70816 ( );  Dry Needling          mins self-pay  Traction          mins 60881  Re-Eval                               mins  90419  Group Therapy           ___ mins 44285    Timed Treatment:   30   mins   Total Treatment:     40   mins    Ra Peterson PT  Physical Therapist  [place] [facility] license #: [id number]

## 2024-10-21 ENCOUNTER — TREATMENT (OUTPATIENT)
Dept: PHYSICAL THERAPY | Facility: CLINIC | Age: 69
End: 2024-10-21
Payer: MEDICARE

## 2024-10-21 DIAGNOSIS — M43.6 NECK STIFFNESS: ICD-10-CM

## 2024-10-21 DIAGNOSIS — R42 DIZZINESS: Primary | ICD-10-CM

## 2024-10-21 PROCEDURE — 97530 THERAPEUTIC ACTIVITIES: CPT | Performed by: PHYSICAL THERAPIST

## 2024-10-21 PROCEDURE — 97112 NEUROMUSCULAR REEDUCATION: CPT | Performed by: PHYSICAL THERAPIST

## 2024-10-21 NOTE — PROGRESS NOTES
Physical Therapy Daily Treatment Note  [facility] Physical Therapy  [facility], [address], [place] [address]    Patient: Nathan Burk  : 1955  Referring practitioner: Junaid Rock MD  Today's Date: 10/21/2024    VISIT#: 4    Visit Diagnoses:    ICD-10-CM ICD-9-CM   1. Dizziness  R42 780.4   2. Neck stiffness  M43.6 723.5       Subjective   Nathan Burk reports: that he went camping with his grandkids on the weekend, and was more dizzy than he has been in about a week. He ended up having to take meclizine. He notes on questioning that he did do more lifting and carrying of heavy items than he has in a long time.      Objective          Strength/Myotome Testing     Left Shoulder     Planes of Motion   Flexion: 5 (scapular elevation to stabilize)   Abduction: 5 (scapular elevation to stabilize)   External rotation at 0°: 5     Isolated Muscles   Rhomboids: 4     Right Shoulder     Planes of Motion   Flexion: 5 (scapular elevation to stabilize)   Abduction: 5 (scapular elevation to stabilize)   External rotation at 0°: 5     Isolated Muscles   Rhomboids: 4         See Exercise, Manual, and Modality Logs for complete treatment.     Patient Education: HEP update; shoulder muscle involvement      Assessment/Plan  Pt tolerated session well with some mild dizziness noted during gaze stabilization tasks. Educated on goal of slight provocation of instability without significant disruption to equilibrium. Discussed effect of lifting, carrying, unfamiliar mattress/pillow on cervical musculature and likely exacerbation of cervicogenic symptoms. He tolerated addition of UE strengthening/tolerance tasks well with cuing for correct technique required for each.    Progress per Plan of Care        Timed:         Manual Therapy:         mins  63866;     Therapeutic Exercise:         mins  41919;     Neuromuscular Asif:    18    mins  44548;    Therapeutic Activity:     12     mins   97784;   w/ education and assessment  Gait Training:           mins  74104;     Ultrasound:          mins  23855;    Ionto:                                   mins  91294  Self Care:                            mins  34721    Un-Timed:  Canalith repositioning: ___ mins 13159  Electrical Stimulation:         mins  62121 ( );  Dry Needling          mins self-pay  Traction          mins 85380  Re-Eval                               mins  44975  Group Therapy           ___ mins 94595    Timed Treatment:   30   mins   Total Treatment:     45   mins    Ra Peterson PT  Physical Therapist  [facility] license #: [id number]

## 2024-10-23 ENCOUNTER — TREATMENT (OUTPATIENT)
Dept: PHYSICAL THERAPY | Facility: CLINIC | Age: 69
End: 2024-10-23
Payer: MEDICARE

## 2024-10-23 DIAGNOSIS — M43.6 NECK STIFFNESS: ICD-10-CM

## 2024-10-23 DIAGNOSIS — R42 DIZZINESS: Primary | ICD-10-CM

## 2024-10-23 NOTE — PROGRESS NOTES
Physical Therapy Daily Treatment Note  [facility] Physical Therapy  [facility], [address], [place] [address]    Patient: Nathan Burk  : 1955  Referring practitioner: Junaid Rock MD  Today's Date: 10/23/2024    VISIT#: 5    Visit Diagnoses:    ICD-10-CM ICD-9-CM   1. Dizziness  R42 780.4   2. Neck stiffness  M43.6 723.5       Subjective   Nathan Burk reports: that he's feeling pretty good. He hasn't noticed any feelings of dizziness since last session of PT, and hasn't taken any more medications for dizziness. Wants to know if he can start doing some exercise for UE's like push-ups that he used to do a couple of years ago.      Objective     See Exercise, Manual, and Modality Logs for complete treatment.     Patient Education: exercise recommendations - start very light versions of each exercise to avoid provocation of symptoms; tolerance vs capacity for exercise; HEP update/modifications to technique      Assessment/Plan  Pt tolerated session well today with some mild dizziness provoked during vertical VOR task. He had some initial difficulty with chin tuck at wall today, improved following thoracic extension exercise progressed to include towel roll. He was provided verbal cuing and rationale for avoidance of end range position during supine cheerleaders due to some shoulder discomfort.    Progress per Plan of Care        Timed:         Manual Therapy:         mins  82198;     Therapeutic Exercise:         mins  78753;     Neuromuscular Asif:    15    mins  76824;    Therapeutic Activity:     30     mins  41389;     Gait Training:           mins  65871;     Ultrasound:          mins  48471;    Ionto:                                   mins  93634  Self Care:                       10     mins  99313    Un-Timed:  Canalith repositioning: ___ mins 75920  Electrical Stimulation:         mins  47631 ( );  Dry Needling          mins self-pay  Traction           mins 44043  Re-Eval                               mins  62574  Group Therapy           ___ mins 00765    Timed Treatment:   55   mins   Total Treatment:     55   mins    Ra Peterson PT  Physical Therapist  [facility] license #: [id number]

## 2024-10-29 ENCOUNTER — TREATMENT (OUTPATIENT)
Dept: PHYSICAL THERAPY | Facility: CLINIC | Age: 69
End: 2024-10-29
Payer: MEDICARE

## 2024-10-29 DIAGNOSIS — M43.6 NECK STIFFNESS: ICD-10-CM

## 2024-10-29 DIAGNOSIS — R42 DIZZINESS: Primary | ICD-10-CM

## 2024-10-29 PROCEDURE — 97530 THERAPEUTIC ACTIVITIES: CPT | Performed by: PHYSICAL THERAPIST

## 2024-10-29 PROCEDURE — 97112 NEUROMUSCULAR REEDUCATION: CPT | Performed by: PHYSICAL THERAPIST

## 2024-10-29 NOTE — PROGRESS NOTES
Physical Therapy Daily Treatment Note  [facility] Physical Therapy  [facility], [address], [place] [address]    Patient: Nathan Burk  : 1955  Referring practitioner: Junaid Rock MD  Today's Date: 10/29/2024    VISIT#: 6    Visit Diagnoses:    ICD-10-CM ICD-9-CM   1. Dizziness  R42 780.4   2. Neck stiffness  M43.6 723.5       Subjective   Nathan Burk reports: that he's no longer taking meclizine, and he reports only getting a little dizzy very occasionally, usually when rapidly getting up from recliner or supine positions.      Objective     See Exercise, Manual, and Modality Logs for complete treatment.     Patient Education: use of difficult background to increase challenge to visual/vestibular exercises      Assessment/Plan  Pt tolerated session well overall without provocation of dizziness during cervical exercises. Addition of maze as background for gaze and smooth pursuit tasks increased pt report of slight dizzy feeling. He is making good progress and is suitable to increase challenge for VRT to include visual distraction at home with use of TV as background.    Progress per Plan of Care        Timed:         Manual Therapy:         mins  36771;     Therapeutic Exercise:         mins  35692;     Neuromuscular Asif:    20    mins  85277;    Therapeutic Activity:     10     mins  49563;     Gait Training:           mins  57191;     Ultrasound:          mins  56744;    Ionto:                                   mins  10885  Self Care:                            mins  49823    Un-Timed:  Canalith repositioning: ___ mins 43342  Electrical Stimulation:         mins  77201 (MC );  Dry Needling          mins self-pay  Traction          mins 68355  Re-Eval                               mins  05675  Group Therapy           ___ mins 76730    Timed Treatment:   30   mins   Total Treatment:     45   mins    Ra Peterson PT  Physical Therapist  [facility]  license #: [id number]

## 2024-10-31 ENCOUNTER — TREATMENT (OUTPATIENT)
Dept: PHYSICAL THERAPY | Facility: CLINIC | Age: 69
End: 2024-10-31
Payer: MEDICARE

## 2024-10-31 DIAGNOSIS — M43.6 NECK STIFFNESS: ICD-10-CM

## 2024-10-31 DIAGNOSIS — R42 DIZZINESS: Primary | ICD-10-CM

## 2024-10-31 NOTE — PROGRESS NOTES
Physical Therapy Daily Treatment Note  [facility] Physical Therapy  [facility], [address], [place] [id number]    Patient: Nathan Burk  : 1955  Referring practitioner: Junaid Rock MD  Today's Date: 10/31/2024    VISIT#: 7    Visit Diagnoses:    ICD-10-CM ICD-9-CM   1. Dizziness  R42 780.4   2. Neck stiffness  M43.6 723.5       Subjective   Nathan Burk reports: that he tried the gaze stabilization exercise in front of his TV and didn't notice any provocation of symptoms.      Objective     See Exercise, Manual, and Modality Logs for complete treatment.     Patient Education: increasing reps of VRT exercises to provoke symptoms.      Assessment/Plan  Pt tolerated session well but noticed some dizziness on last repetition of horizontal gaze task with maze background. Discussed progressing reps of this task at home to attempt to improve total tolerance to vestibular stimulus.    Progress per Plan of Care        Timed:         Manual Therapy:         mins  39018;     Therapeutic Exercise:         mins  70423;     Neuromuscular Asif:    15    mins  09838;    Therapeutic Activity:     15     mins  75457;     Gait Training:           mins  21623;     Ultrasound:          mins  96581;    Ionto:                                   mins  89725  Self Care:                            mins  25684    Un-Timed:  Canalith repositioning: ___ mins 90210  Electrical Stimulation:         mins  14869 ( );  Dry Needling          mins self-pay  Traction          mins 43385  Re-Eval                               mins  52043  Group Therapy           ___ mins 47843    Timed Treatment:   30   mins   Total Treatment:     40   mins    Ra Peterson PT  Physical Therapist  [place] license #: [id number]

## 2024-11-18 ENCOUNTER — TELEPHONE (OUTPATIENT)
Dept: FAMILY MEDICINE CLINIC | Facility: CLINIC | Age: 69
End: 2024-11-18
Payer: MEDICARE

## 2024-11-18 ENCOUNTER — TREATMENT (OUTPATIENT)
Dept: PHYSICAL THERAPY | Facility: CLINIC | Age: 69
End: 2024-11-18
Payer: MEDICARE

## 2024-11-18 DIAGNOSIS — M43.6 NECK STIFFNESS: ICD-10-CM

## 2024-11-18 DIAGNOSIS — R42 DIZZINESS: Primary | ICD-10-CM

## 2024-11-18 DIAGNOSIS — R42 VERTIGO: Primary | ICD-10-CM

## 2024-11-18 PROCEDURE — 97112 NEUROMUSCULAR REEDUCATION: CPT | Performed by: PHYSICAL THERAPIST

## 2024-11-18 PROCEDURE — 97535 SELF CARE MNGMENT TRAINING: CPT | Performed by: PHYSICAL THERAPIST

## 2024-11-18 RX ORDER — MECLIZINE HYDROCHLORIDE 25 MG/1
25 TABLET ORAL EVERY 6 HOURS PRN
Qty: 30 TABLET | Refills: 0 | Status: SHIPPED | OUTPATIENT
Start: 2024-11-18

## 2024-11-18 NOTE — LETTER
Physical Therapy Progress Note  [facility] Physical Therapy  [facility], [address], [place] [address]    Patient: Nathan Burk  : 1955  Referring practitioner: Junaid Rock MD  Today's Date: 2024    VISIT#: 8    Visit Diagnoses:    ICD-10-CM ICD-9-CM   1. Dizziness  R42 780.4   2. Neck stiffness  M43.6 723.5     DHI: 12%    Subjective   Nathan Burk reports: that he felt good after his last session for around 2 weeks but his symptoms recently returned. He has continued with most of his exercises but the dizziness hasn't been settling back down. He feels like symptoms are only around 25% better than his first session, but they had been pretty much under control before the flare-up.        Objective          Static Posture     Head  Forward and tilted left.    Palpation   Left   Tenderness of the cervical interspinals, scalenes, sternocleidomastoid and suboccipitals.     Right Tenderness of the scalenes, sternocleidomastoid and suboccipitals.     Active Range of Motion     Additional Active Range of Motion Details  Ext 50% - mild dizziness  Flex 80%   L rot 70% - mild dizziness  R rot 90%  L SB 80%  R SB 70%  Dizziness not worsened with prolonged hold times    Passive Range of Motion     Additional Passive Range of Motion Details  Moderate B C2-7 hypomobility with mild discomfort on L UPA assessment    Functional Assessment     Comments  Visual Fields - WNL  Smooth pursuit - WNL  Saccadic motion - slight dizziness    VOR (head shake/thrust) - mild dizziness on cessation of test, no losses of visual fixation  VOR cancellation - slight dizziness on cessation, less than VOR test    CDDT - mild dizziness during and upon cessation    Shayna Hallpike   L - WNL  R - WNL    Horizontal roll test supine  L - WNL  R - WNL          Assessment & Plan       Assessment  Assessment details: Pt continues to present to PT with complaints of dizziness. He reports ~25%  improvements in symptoms since starting PT, how veer this is after a recent remission following his initial improvements. His DHI score improved from 22% to 12%.  He has demonstrated less dizziness and greater ROM on cervical mobility assessment with primarily left sided discomfort during muscle palpation. His posture remains abnormal with left side bend at rest.  He has met 3/3 STGs for therapy with progress toward his remaining goals.  He will continue to benefit from skilled PT services to address his ongoing deficits and facilitate a return to his PLOF.    Goals  Plan Goals: ST. Pt will be independent and compliant with initial HEP in 2 weeks.  2. Pt will demonstrate good safety awareness & decision making to reduce falls risk in 2 weeks.  3. Pt will report minimal dizziness with cervical rotation AROM testing in 4 weeks.    LT. Pt will be independent with final HEP for self-management of condition by DC.  2. Pt will improve DHI score to <10% to indicate improved function with less dizziness by DC  3. Pt will demonstrate cervical AROM > 60% without dizziness by DC to improve tolerance to functional activities.   4. Pt will demonstrate no dizziness during VOR/vestibular testing by DC to indicate improved safety with functional movements such as driving.      Plan  Therapy options: will be seen for skilled therapy services  Treatment plan discussed with: patient      Progress per Plan of Care      Ra Peterson, PT  Physical Therapist  [place] [facility] license #: [id number]

## 2024-11-18 NOTE — PROGRESS NOTES
Physical Therapy Daily Treatment and Progress Note  [facility] Physical Therapy  [facility], [address], [place] [address]    Patient: Nathan Burk  : 1955  Referring practitioner: Junaid Rock MD  Today's Date: 2024    VISIT#: 8    Visit Diagnoses:    ICD-10-CM ICD-9-CM   1. Dizziness  R42 780.4   2. Neck stiffness  M43.6 723.5     DHI: 12%    Subjective   Nathan Burk reports: that he felt good after his last session for around 2 weeks but his symptoms recently returned. He has continued with most of his exercises but the dizziness hasn't been settling back down. He feels like symptoms are only around 25% better than his first session, but they had been pretty much under control before the flare-up.        Objective          Static Posture     Head  Forward and tilted left.    Palpation   Left   Tenderness of the cervical interspinals, scalenes, sternocleidomastoid and suboccipitals.     Right Tenderness of the scalenes, sternocleidomastoid and suboccipitals.     Active Range of Motion     Additional Active Range of Motion Details  Ext 50% - mild dizziness  Flex 80%   L rot 70% - mild dizziness  R rot 90%  L SB 80%  R SB 70%  Dizziness not worsened with prolonged hold times    Passive Range of Motion     Additional Passive Range of Motion Details  Moderate B C2-7 hypomobility with mild discomfort on L UPA assessment    Functional Assessment     Comments  Visual Fields - WNL  Smooth pursuit - WNL  Saccadic motion - slight dizziness    VOR (head shake/thrust) - mild dizziness on cessation of test, no losses of visual fixation  VOR cancellation - slight dizziness on cessation, less than VOR test    CDDT - mild dizziness during and upon cessation    Shayna Hallpike   L - WNL  R - WNL    Horizontal roll test supine  L - WNL  R - WNL        See Exercise, Manual, and Modality Logs for complete treatment.     Patient Education: assessment findings and progress; HEP  update        Assessment & Plan       Assessment  Assessment details: Pt continues to present to PT with complaints of dizziness. He reports ~25% improvements in symptoms since starting PT, how veer this is after a recent remission following his initial improvements. His DHI score improved from 22% to 12%.  He has demonstrated less dizziness and greater ROM on cervical mobility assessment with primarily left sided discomfort during muscle palpation. His posture remains abnormal with left side bend at rest.  He has met 3/3 STGs for therapy with progress toward his remaining goals.  He will continue to benefit from skilled PT services to address his ongoing deficits and facilitate a return to his PLOF.    Goals  Plan Goals: ST. Pt will be independent and compliant with initial HEP in 2 weeks.  2. Pt will demonstrate good safety awareness & decision making to reduce falls risk in 2 weeks.  3. Pt will report minimal dizziness with cervical rotation AROM testing in 4 weeks.    LT. Pt will be independent with final HEP for self-management of condition by DC.  2. Pt will improve DHI score to <10% to indicate improved function with less dizziness by DC  3. Pt will demonstrate cervical AROM > 60% without dizziness by DC to improve tolerance to functional activities.   4. Pt will demonstrate no dizziness during VOR/vestibular testing by DC to indicate improved safety with functional movements such as driving.      Plan  Therapy options: will be seen for skilled therapy services  Treatment plan discussed with: patient      Progress per Plan of Care        Timed:         Manual Therapy:         mins  78587;     Therapeutic Exercise:         mins  24719;     Neuromuscular Asif:    20    mins  10556;    Therapeutic Activity:          mins  14711;     Gait Training:           mins  62636;     Ultrasound:          mins  36995;    Ionto:                                   mins  63424  Self Care:                       10      mins  15788    Un-Timed:  Canalith repositioning: ___ mins 18753  Electrical Stimulation:         mins  65780 ( );  Dry Needling          mins self-pay  Traction          mins 45609  Re-Eval                               mins  06335  Group Therapy           ___ mins 88732    Timed Treatment:   30   mins   Total Treatment:     45   mins    Ra Peterson PT  Physical Therapist  [place] license #: [id number]

## 2024-11-19 ENCOUNTER — TREATMENT (OUTPATIENT)
Dept: PHYSICAL THERAPY | Facility: CLINIC | Age: 69
End: 2024-11-19
Payer: MEDICARE

## 2024-11-19 DIAGNOSIS — R42 DIZZINESS: Primary | ICD-10-CM

## 2024-11-19 DIAGNOSIS — M43.6 NECK STIFFNESS: ICD-10-CM

## 2024-11-19 PROCEDURE — 97112 NEUROMUSCULAR REEDUCATION: CPT | Performed by: PHYSICAL THERAPIST

## 2024-11-19 PROCEDURE — 97530 THERAPEUTIC ACTIVITIES: CPT | Performed by: PHYSICAL THERAPIST

## 2024-11-19 NOTE — PROGRESS NOTES
Physical Therapy Daily Treatment Note  [facility] Physical Therapy  [facility], [address], [place] [address]    Patient: Nathan Burk  : 1955  Referring practitioner: Junaid Rock MD  Today's Date: 2024    VISIT#: 9    Visit Diagnoses:    ICD-10-CM ICD-9-CM   1. Dizziness  R42 780.4   2. Neck stiffness  M43.6 723.5       Subjective   Nathan Burk reports: that he feels better since yesterday with less dizziness this morning. He did a lot of practice of the proprioceptive task.      Objective     See Exercise, Manual, and Modality Logs for complete treatment.     Patient Education: HEP update w/ sidebends      Assessment/Plan  Pt tolerated session well with ongoing mirror cues required for appropriate cervical alignment during many tasks. He noted some dizziness after repeated exercises, increased further with new sidebending exercise. Discussed deliberate MILD provocation of symptoms with HEP to improve adaptation and integration of vestibular/visual/somatosensory systems.    Progress per Plan of Care        Timed:         Manual Therapy:         mins  02576;     Therapeutic Exercise:         mins  79290;     Neuromuscular Asif:    15    mins  50478;    Therapeutic Activity:     15     mins  02599;     Gait Training:           mins  38422;     Ultrasound:          mins  48590;    Ionto:                                   mins  39891  Self Care:                            mins  90492    Un-Timed:  Canalith repositioning: ___ mins 23010  Electrical Stimulation:         mins  39485 ( );  Dry Needling          mins self-pay  Traction          mins 02968  Re-Eval                               mins  73382  Group Therapy           ___ mins 35372    Timed Treatment:   30   mins   Total Treatment:     30   mins    Ra Peterson PT  Physical Therapist  [place] [facility] license #: [id number]

## 2024-12-02 RX ORDER — ATORVASTATIN CALCIUM 40 MG/1
40 TABLET, FILM COATED ORAL DAILY
Qty: 90 TABLET | Refills: 1 | Status: SHIPPED | OUTPATIENT
Start: 2024-12-02

## 2024-12-02 NOTE — TELEPHONE ENCOUNTER
Rx Refill Note  Requested Prescriptions     Pending Prescriptions Disp Refills    atorvastatin (LIPITOR) 40 MG tablet [Pharmacy Med Name: ATORVASTATIN 40 MG TABLET] 90 tablet 1     Sig: TAKE 1 TABLET BY MOUTH EVERY DAY      Last office visit with prescribing clinician: 10/1/2024   Last telemedicine visit with prescribing clinician: Visit date not found   Next office visit with prescribing clinician: 2/24/2025                         Would you like a call back once the refill request has been completed: [] Yes [] No    If the office needs to give you a call back, can they leave a voicemail: [] Yes [] No    Naheed Luz  12/02/24, 10:22 EST   Plan: This patient has been treated today with superficial radiation therapy for non-melanoma skin cancer.   \\n \\nWritten informed consent has been previously obtained from this patient for this treatment.  This consent is documented in the patient’s chart.  The patient gave verbal consent to continue treatment today.\\n \\nThe patient was treated with a specific radiation dose and setup as prescribed by the provider listed on this visit note.  A Radiation Therapist performed administration of radiation. The treatment parameters and cumulative dose are indicated above. \\n \\nPrior to administering the radiation, the patient underwent a verification simulation defining relevant normal and abnormal target anatomy and acquiring images and data necessary to develop optimal radiation treatment process for the patient. This process includes verification of the treatment port(s) and proper treatment positioning.  All treatment ports were photographed.  The patient’s customized lead blocking was confirmed.   Considering, superficial radiotherapy setup is a clinical setup, this requires physician and radiation therapist to clarify location interest being treated against initial images, pathology and patient anatomy. Care was taken ensuring fields treated were geometrically accurate and properly positioned using daily verification simulation.  This process is also utilized to determine if any changes are necessary.   These steps are therefore medically necessary ensuring safe and effective administration of radiation.\\n \\nA high frequency ultrasound image was acquired today for two-dimensional evaluation of the tumor volume and response to treatment, in addition to geometric accuracy of field placement. The daily field placement is separate and distinct from the initial simulation and is an important task in providing safe administration of radiation therapy. Physician evaluation of the ultrasound tumor depth will be ongoing throughout the course of treatment and is deemed medically necessary in order to ensure the efficacy of treatment. Today’s image was evaluated for determination of continuation of treatment with the current plan or with necessary changes as appropriate. Additionally, the use of visualization and targeted assessment allows the patient to be able to see their cancer(s) progress, encouraging patient to complete full course of radiotherapy. \\n \\nPer Dr. Childress, continued daily US guidance and clinical setup simulation is required for field placement, measurement of tumor depth, progress and acute effect monitoring.\\n\\nL. Central Forehead\\nUs depth is 0.0mm, Repop +++, MAXIMUS 0.0mm sq\\n\\nL. Medial Frontal Scalp\\nUS depth is 1.22mm, Repop +++, MAXIMUS equivocal\\n\\nMid- Occipital Scalp\\nUS depth is 0.0mm, Repop +++, MAXIMUS 0.0mm sq Modify Regimen: Decrease in energy from 70kv to 50Kv on all sites, per prescription protocol Detail Level: Zone

## 2024-12-04 ENCOUNTER — TREATMENT (OUTPATIENT)
Dept: PHYSICAL THERAPY | Facility: CLINIC | Age: 69
End: 2024-12-04
Payer: MEDICARE

## 2024-12-04 DIAGNOSIS — M43.6 NECK STIFFNESS: ICD-10-CM

## 2024-12-04 DIAGNOSIS — R42 DIZZINESS: Primary | ICD-10-CM

## 2024-12-04 PROCEDURE — 97530 THERAPEUTIC ACTIVITIES: CPT | Performed by: PHYSICAL THERAPIST

## 2024-12-04 PROCEDURE — 97112 NEUROMUSCULAR REEDUCATION: CPT | Performed by: PHYSICAL THERAPIST

## 2024-12-04 NOTE — PROGRESS NOTES
Physical Therapy Daily Treatment Note and Discharge  [facility] Physical Therapy  [facility], [address], [place] [address]    Patient: Nathan Burk  : 1955  Referring practitioner: Junaid Rock MD  Today's Date: 2024    VISIT#: 10    Visit Diagnoses:    ICD-10-CM ICD-9-CM   1. Dizziness  R42 780.4   2. Neck stiffness  M43.6 723.5       Subjective   Nathan Burk reports: feels like his dizziness has been good recently, but has been taking meclizine again. He doesn't think he's had any dizziness in over week.      Objective     See Exercise, Manual, and Modality Logs for complete treatment.     Patient Education: HEP update; cease meclizine and assess symptom response; discharge education      Assessment/Plan  Pt tolerated session well today without dizziness noted during any tasks. He demonstrates improved cervical alignment with minimal cuing. Discussed cessation of meclizine to determine symptoms, and discharge today with continued HEP.    Discharge to [facility].        Timed:         Manual Therapy:         mins  74224;     Therapeutic Exercise:         mins  31478;     Neuromuscular Asif:    15    mins  15931;    Therapeutic Activity:     10     mins  17148;     Gait Training:           mins  17083;     Ultrasound:          mins  52499;    Ionto:                                   mins  51599  Self Care:                       5     mins  51872    Un-Timed:  Canalith repositioning: ___ mins [id number]  Electrical Stimulation:         mins  84322 ( );  Dry Needling          mins self-pay  Traction          mins 55550  Re-Eval                               mins  82709  Group Therapy           ___ mins 00863    Timed Treatment:   30   mins   Total Treatment:     35   mins    Ra Peterson PT  Physical Therapist  [place] PT license #: [id number]

## 2024-12-06 ENCOUNTER — TRANSCRIBE ORDERS (OUTPATIENT)
Dept: ADMINISTRATIVE | Facility: HOSPITAL | Age: 69
End: 2024-12-06
Payer: MEDICARE

## 2024-12-06 DIAGNOSIS — R42 DIZZINESS: Primary | ICD-10-CM

## 2025-02-07 ENCOUNTER — LAB (OUTPATIENT)
Dept: LAB | Facility: HOSPITAL | Age: 70
End: 2025-02-07
Payer: MEDICARE

## 2025-02-07 ENCOUNTER — HOSPITAL ENCOUNTER (OUTPATIENT)
Dept: GENERAL RADIOLOGY | Facility: HOSPITAL | Age: 70
Discharge: HOME | End: 2025-02-07
Payer: MEDICARE

## 2025-02-07 ENCOUNTER — TRANSCRIBE ORDERS (OUTPATIENT)
Dept: LAB | Facility: HOSPITAL | Age: 70
End: 2025-02-07
Payer: MEDICARE

## 2025-02-07 ENCOUNTER — HOSPITAL ENCOUNTER (OUTPATIENT)
Dept: CARDIOLOGY | Facility: HOSPITAL | Age: 70
Discharge: HOME | End: 2025-02-07
Payer: MEDICARE

## 2025-02-07 DIAGNOSIS — M25.559 ARTHRALGIA OF HIP, UNSPECIFIED LATERALITY: ICD-10-CM

## 2025-02-07 DIAGNOSIS — M25.559 ARTHRALGIA OF HIP, UNSPECIFIED LATERALITY: Primary | ICD-10-CM

## 2025-02-07 LAB
ABO GROUP BLD: NORMAL
ALBUMIN SERPL-MCNC: 4.3 G/DL (ref 3.5–5.2)
ALBUMIN/GLOB SERPL: 1.7 G/DL
ALP SERPL-CCNC: 100 U/L (ref 39–117)
ALT SERPL W P-5'-P-CCNC: 40 U/L (ref 1–41)
ANION GAP SERPL CALCULATED.3IONS-SCNC: 10 MMOL/L (ref 5–15)
AST SERPL-CCNC: 34 U/L (ref 1–40)
BASOPHILS # BLD AUTO: 0.02 10*3/MM3 (ref 0–0.2)
BASOPHILS NFR BLD AUTO: 0.4 % (ref 0–1.5)
BILIRUB SERPL-MCNC: 0.3 MG/DL (ref 0–1.2)
BILIRUB UR QL STRIP: NEGATIVE
BLD GP AB SCN SERPL QL: NEGATIVE
BUN SERPL-MCNC: 15 MG/DL (ref 8–23)
BUN/CREAT SERPL: 16.9 (ref 7–25)
CALCIUM SPEC-SCNC: 9.5 MG/DL (ref 8.6–10.5)
CHLORIDE SERPL-SCNC: 103 MMOL/L (ref 98–107)
CLARITY UR: CLEAR
CO2 SERPL-SCNC: 27 MMOL/L (ref 22–29)
COLOR UR: YELLOW
CREAT SERPL-MCNC: 0.89 MG/DL (ref 0.76–1.27)
DEPRECATED RDW RBC AUTO: 39.1 FL (ref 37–54)
EGFRCR SERPLBLD CKD-EPI 2021: 92.8 ML/MIN/1.73
EOSINOPHIL # BLD AUTO: 0.05 10*3/MM3 (ref 0–0.4)
EOSINOPHIL NFR BLD AUTO: 1 % (ref 0.3–6.2)
ERYTHROCYTE [DISTWIDTH] IN BLOOD BY AUTOMATED COUNT: 12 % (ref 12.3–15.4)
GLOBULIN UR ELPH-MCNC: 2.5 GM/DL
GLUCOSE SERPL-MCNC: 95 MG/DL (ref 65–99)
GLUCOSE UR STRIP-MCNC: NEGATIVE MG/DL
HCT VFR BLD AUTO: 47.3 % (ref 37.5–51)
HGB BLD-MCNC: 16.2 G/DL (ref 13–17.7)
HGB UR QL STRIP.AUTO: NEGATIVE
HOLD SPECIMEN: NORMAL
IMM GRANULOCYTES # BLD AUTO: 0.02 10*3/MM3 (ref 0–0.05)
IMM GRANULOCYTES NFR BLD AUTO: 0.4 % (ref 0–0.5)
INR PPP: 0.95 (ref 0.9–1.1)
KETONES UR QL STRIP: NEGATIVE
LEUKOCYTE ESTERASE UR QL STRIP.AUTO: NEGATIVE
LYMPHOCYTES # BLD AUTO: 1.65 10*3/MM3 (ref 0.7–3.1)
LYMPHOCYTES NFR BLD AUTO: 32.7 % (ref 19.6–45.3)
MCH RBC QN AUTO: 30.9 PG (ref 26.6–33)
MCHC RBC AUTO-ENTMCNC: 34.2 G/DL (ref 31.5–35.7)
MCV RBC AUTO: 90.3 FL (ref 79–97)
MONOCYTES # BLD AUTO: 0.58 10*3/MM3 (ref 0.1–0.9)
MONOCYTES NFR BLD AUTO: 11.5 % (ref 5–12)
NEUTROPHILS NFR BLD AUTO: 2.72 10*3/MM3 (ref 1.7–7)
NEUTROPHILS NFR BLD AUTO: 54 % (ref 42.7–76)
NITRITE UR QL STRIP: NEGATIVE
NRBC BLD AUTO-RTO: 0 /100 WBC (ref 0–0.2)
PH UR STRIP.AUTO: 6.5 [PH] (ref 5–8)
PLATELET # BLD AUTO: 209 10*3/MM3 (ref 140–450)
PMV BLD AUTO: 10.4 FL (ref 6–12)
POTASSIUM SERPL-SCNC: 4.7 MMOL/L (ref 3.5–5.2)
PROT SERPL-MCNC: 6.8 G/DL (ref 6–8.5)
PROT UR QL STRIP: NEGATIVE
PROTHROMBIN TIME: 12.7 SECONDS (ref 11.7–14.2)
QT INTERVAL: 380 MS
QTC INTERVAL: 413 MS
RBC # BLD AUTO: 5.24 10*6/MM3 (ref 4.14–5.8)
RH BLD: POSITIVE
SODIUM SERPL-SCNC: 140 MMOL/L (ref 136–145)
SP GR UR STRIP: 1.01 (ref 1–1.03)
T&S EXPIRATION DATE: NORMAL
UROBILINOGEN UR QL STRIP: NORMAL
WBC NRBC COR # BLD AUTO: 5.04 10*3/MM3 (ref 3.4–10.8)

## 2025-02-07 PROCEDURE — 85610 PROTHROMBIN TIME: CPT

## 2025-02-07 PROCEDURE — 81003 URINALYSIS AUTO W/O SCOPE: CPT

## 2025-02-07 PROCEDURE — 93005 ELECTROCARDIOGRAM TRACING: CPT | Performed by: ORTHOPAEDIC SURGERY

## 2025-02-07 PROCEDURE — 86901 BLOOD TYPING SEROLOGIC RH(D): CPT

## 2025-02-07 PROCEDURE — 85025 COMPLETE CBC W/AUTO DIFF WBC: CPT

## 2025-02-07 PROCEDURE — 36415 COLL VENOUS BLD VENIPUNCTURE: CPT

## 2025-02-07 PROCEDURE — 71046 X-RAY EXAM CHEST 2 VIEWS: CPT

## 2025-02-07 PROCEDURE — 86900 BLOOD TYPING SEROLOGIC ABO: CPT

## 2025-02-07 PROCEDURE — 80053 COMPREHEN METABOLIC PANEL: CPT

## 2025-02-07 PROCEDURE — 86850 RBC ANTIBODY SCREEN: CPT

## 2025-02-14 LAB
QT INTERVAL: 380 MS
QTC INTERVAL: 413 MS

## 2025-04-16 DIAGNOSIS — Z11.59 NEED FOR HEPATITIS C SCREENING TEST: ICD-10-CM

## 2025-04-30 ENCOUNTER — OFFICE VISIT (OUTPATIENT)
Dept: FAMILY MEDICINE CLINIC | Facility: CLINIC | Age: 70
End: 2025-04-30
Payer: MEDICARE

## 2025-04-30 VITALS
DIASTOLIC BLOOD PRESSURE: 84 MMHG | OXYGEN SATURATION: 98 % | BODY MASS INDEX: 33.09 KG/M2 | SYSTOLIC BLOOD PRESSURE: 140 MMHG | RESPIRATION RATE: 17 BRPM | HEART RATE: 97 BPM | WEIGHT: 231.1 LBS | HEIGHT: 70 IN

## 2025-04-30 DIAGNOSIS — E78.00 PURE HYPERCHOLESTEROLEMIA: ICD-10-CM

## 2025-04-30 DIAGNOSIS — R73.01 IMPAIRED FASTING GLUCOSE: ICD-10-CM

## 2025-04-30 DIAGNOSIS — Z00.00 MEDICARE ANNUAL WELLNESS VISIT, SUBSEQUENT: Primary | ICD-10-CM

## 2025-04-30 RX ORDER — AMOXICILLIN 500 MG/1
TABLET, FILM COATED ORAL
COMMUNITY
End: 2025-04-30

## 2025-04-30 RX ORDER — ATORVASTATIN CALCIUM 40 MG/1
40 TABLET, FILM COATED ORAL DAILY
Qty: 90 TABLET | Refills: 1 | Status: SHIPPED | OUTPATIENT
Start: 2025-04-30

## 2025-04-30 RX ORDER — TRAMADOL HYDROCHLORIDE 50 MG/1
50 TABLET ORAL EVERY 6 HOURS PRN
COMMUNITY

## 2025-04-30 NOTE — PROGRESS NOTES
Subjective   The ABCs of the Annual Wellness Visit  Medicare Wellness Visit      Nathan Burk is a 70 y.o. patient who presents for a Medicare Wellness Visit.    The following portions of the patient's history were reviewed and   updated as appropriate: allergies, current medications, past family history, past medical history, past social history, past surgical history, and problem list.    Compared to one year ago, the patient's physical   health is the same.  Compared to one year ago, the patient's mental   health is the same.    Recent Hospitalizations:  He was not admitted to the hospital during the last year.     Current Medical Providers:  Patient Care Team:  Bud Rush MD as PCP - General (Family Medicine)    Outpatient Medications Prior to Visit   Medication Sig Dispense Refill    ANORO ELLIPTA 62.5-25 MCG/INH aerosol powder  inhaler Inhale 1 puff Daily.  6    azelastine (ASTELIN) 0.1 % nasal spray Administer 1 spray into the nostril(s) as directed by provider Daily. Use in each nostril as directed      DUPIXENT 300 MG/2ML solution prefilled syringe Every 14 (Fourteen) Days. LAST DOSE July 4, 2021.  TAKING FOR ALLERGY RELATED SYMTOMS      Fluticasone Furoate (ARNUITY ELLIPTA IN) Inhale.      lansoprazole (PREVACID) 30 MG capsule Take 1 capsule by mouth Daily.      meloxicam (MOBIC) 15 MG tablet Take 1 tablet by mouth Daily.      montelukast (SINGULAIR) 10 MG tablet Take 1 tablet by mouth Every Morning.      traMADol (ULTRAM) 50 MG tablet Take 1 tablet by mouth Every 6 (Six) Hours As Needed.      amoxicillin (AMOXIL) 500 MG tablet Take 1 tablet by oral route.      atorvastatin (LIPITOR) 40 MG tablet TAKE 1 TABLET BY MOUTH EVERY DAY 90 tablet 1    meclizine (ANTIVERT) 25 MG tablet Take 1 tablet by mouth Every 6 (Six) Hours As Needed for Dizziness. 30 tablet 0    ondansetron ODT (ZOFRAN-ODT) 4 MG disintegrating tablet Place 1 tablet on the tongue Every 6 (Six) Hours As Needed for Vomiting or Nausea.  "20 tablet 0     Facility-Administered Medications Prior to Visit   Medication Dose Route Frequency Provider Last Rate Last Admin    nitroglycerin (NITROSTAT) SL tablet 0.4 mg  0.4 mg Sublingual Q5 Min PRN Prashanth Moctezuma III, MD         Opioid medication/s are on active medication list.  and I have evaluated his active treatment plan and pain score trends (see table).  Vitals:    04/30/25 1240   PainSc: 0-No pain     I have reviewed the chart for potential of high risk medication and harmful drug interactions in the elderly.        Aspirin is not on active medication list.  Aspirin use is not indicated based on review of current medical condition/s. Risk of harm outweighs potential benefits.  .    Patient Active Problem List   Diagnosis    Colon polyps    FH: colon cancer    Pure hypercholesterolemia    Chronic cough    Gastroesophageal reflux disease without esophagitis    Impaired fasting glucose    Colon polyp    Family history of colon cancer    Status post neck surgery, follow-up exam     Advance Care Planning Advance Directive is not on file.  ACP discussion was held with the patient during this visit. Patient has an advance directive (not in EMR), copy requested.            Objective   Vitals:    04/30/25 1240   BP: 140/84   Pulse: 97   Resp: 17   SpO2: 98%   Weight: 105 kg (231 lb 1.6 oz)   Height: 177.8 cm (70\")   PainSc: 0-No pain       Estimated body mass index is 33.16 kg/m² as calculated from the following:    Height as of this encounter: 177.8 cm (70\").    Weight as of this encounter: 105 kg (231 lb 1.6 oz).                Does the patient have evidence of cognitive impairment? No  Lab Results   Component Value Date    CHLPL 195 04/17/2025    TRIG 206 (H) 04/17/2025    HDL 39 (L) 04/17/2025     (H) 04/17/2025    VLDL 36 04/17/2025    HGBA1C 5.40 04/17/2025                                                                                                Health  Risk Assessment    Smoking " Status:  Social History     Tobacco Use   Smoking Status Former    Current packs/day: 0.00    Average packs/day: 0.5 packs/day for 20.0 years (10.0 ttl pk-yrs)    Types: Cigarettes    Start date: 1974    Quit date: 1994    Years since quittin.3   Smokeless Tobacco Never     Alcohol Consumption:  Social History     Substance and Sexual Activity   Alcohol Use Yes    Alcohol/week: 4.0 standard drinks of alcohol    Types: 2 Glasses of wine, 2 Shots of liquor per week    Comment: social 1-2 PER WEEK       Fall Risk Screen  STEADI Fall Risk Assessment was completed, and patient is at LOW risk for falls.Assessment completed on:2025    Depression Screening   Little interest or pleasure in doing things? Not at all   Feeling down, depressed, or hopeless? Not at all   PHQ-2 Total Score 0      Health Habits and Functional and Cognitive Screenin/30/2025    12:48 PM   Functional & Cognitive Status   Do you have difficulty preparing food and eating? No   Do you have difficulty bathing yourself, getting dressed or grooming yourself? No   Do you have difficulty using the toilet? No   Do you have difficulty moving around from place to place? No   Do you have trouble with steps or getting out of a bed or a chair? No   Current Diet Well Balanced Diet   Dental Exam Up to date   Eye Exam Up to date   Exercise (times per week) 2 times per week   Current Exercises Include Walking   Do you need help using the phone?  No   Are you deaf or do you have serious difficulty hearing?  No   Do you need help to go to places out of walking distance? No   Do you need help shopping? No   Do you need help preparing meals?  No   Do you need help with housework?  No   Do you need help with laundry? No   Do you need help taking your medications? No   Do you need help managing money? No   Do you ever drive or ride in a car without wearing a seat belt? No   Have you felt unusual stress, anger or loneliness in the last month? No    Who do you live with? Spouse   If you need help, do you have trouble finding someone available to you? No   Have you been bothered in the last four weeks by sexual problems? No   Do you have difficulty concentrating, remembering or making decisions? No           Age-appropriate Screening Schedule:  Refer to the list below for future screening recommendations based on patient's age, sex and/or medical conditions. Orders for these recommended tests are listed in the plan section. The patient has been provided with a written plan.    Health Maintenance List  Health Maintenance   Topic Date Due    COVID-19 Vaccine (8 - 2024-25 season) 03/12/2025    INFLUENZA VACCINE  07/01/2025    LIPID PANEL  04/17/2026    ANNUAL WELLNESS VISIT  04/30/2026    TDAP/TD VACCINES (2 - Td or Tdap) 02/08/2028    COLORECTAL CANCER SCREENING  09/10/2029    HEPATITIS C SCREENING  Completed    Pneumococcal Vaccine 50+  Completed    AAA SCREEN ONCE  Completed    ZOSTER VACCINE  Completed                                                                                                                                                CMS Preventative Services Quick Reference  Risk Factors Identified During Encounter  Immunizations Discussed/Encouraged: COVID19    The above risks/problems have been discussed with the patient.  Pertinent information has been shared with the patient in the After Visit Summary.  An After Visit Summary and PPPS were made available to the patient.    Follow Up:   Next Medicare Wellness visit to be scheduled in 1 year.         Additional E&M Note during same encounter follows:  Patient has additional, significant, and separately identifiable condition(s)/problem(s) that require work above and beyond the Medicare Wellness Visit     Chief Complaint  Medicare Wellness-subsequent    Subjective   HPI            Hyperlipidemia.  Continues atorvastatin 40 mg daily.  LDL is higher, greater than 100.  He has not been exercising much  "the last 3 months.  He had hip replacement done about 2 months ago.  He states the rehab is done and he started to walk more.  But still stiff.  Blood pressure elevated today.  140/84.  Has been elevated last few checks in our system.  Previous blood pressure readings have been normal.  He is taking meloxicam.  Fasting glucose elevated.  A1c normal.  No anemia.      Objective   Vital Signs:  /84   Pulse 97   Resp 17   Ht 177.8 cm (70\")   Wt 105 kg (231 lb 1.6 oz)   SpO2 98%   BMI 33.16 kg/m²   Physical Exam  Constitutional:       Appearance: Normal appearance.   HENT:      Head: Atraumatic.      Mouth/Throat:      Pharynx: Oropharynx is clear. No oropharyngeal exudate or posterior oropharyngeal erythema.   Eyes:      Conjunctiva/sclera: Conjunctivae normal.   Neck:      Thyroid: No thyroid mass, thyromegaly or thyroid tenderness.   Cardiovascular:      Rate and Rhythm: Normal rate and regular rhythm.      Pulses: Normal pulses.      Heart sounds: Normal heart sounds.   Pulmonary:      Effort: Pulmonary effort is normal.      Breath sounds: Normal breath sounds.   Abdominal:      General: Abdomen is flat. There is no distension.      Palpations: Abdomen is soft. There is no mass.      Tenderness: There is no abdominal tenderness.      Hernia: No hernia is present.   Musculoskeletal:      Cervical back: Normal range of motion and neck supple. No muscular tenderness.      Comments: Decrease range of motion right hip internal and external rotation.   Lymphadenopathy:      Cervical: No cervical adenopathy.   Skin:     General: Skin is warm and dry.      Findings: No rash.   Neurological:      General: No focal deficit present.      Mental Status: He is alert and oriented to person, place, and time.   Psychiatric:         Mood and Affect: Mood normal.         The following data was reviewed by: Bud Rush MD on 04/30/2025:    Common labs          10/1/2024    13:56 2/7/2025    10:27 4/17/2025    08:14 "   Common Labs   Glucose 109  95  117    BUN 14  15  17    Creatinine 0.96  0.89  0.85    Sodium 138  140  146    Potassium 4.7  4.7  5.2    Chloride 101  103  108    Calcium 10.3  9.5  9.8    Albumin 4.6  4.3  4.2    Total Bilirubin 0.5  0.3  0.4    Alkaline Phosphatase 88  100  216    AST (SGOT) 23  34  28    ALT (SGPT) 26  40  30    WBC 8.11  5.04  6.24    Hemoglobin 16.3  16.2  15.9    Hematocrit 49.5  47.3  48.0    Platelets 225  209  247    Total Cholesterol   195    Triglycerides   206    HDL Cholesterol   39    LDL Cholesterol    120    Hemoglobin A1C   5.40    PSA   0.695             Assessment and Plan     Annual Medicare wellness visit.    Immunizations reviewed and recommended.    Colon cancer screening up-to-date.    Prostate cancer screening up-to-date.    Elevated blood pressure readings.  I have asked him to check his blood pressure at home.  The meloxicam may be raising it.    Hyperlipidemia.  Continue atorvastatin 40 mg daily.  Refill given.  Likely his cholesterol improved in 6 months as his exercise improves.    Impaired fasting glucose.  At risk for diabetes.  A1c however is normal with normal hemoglobin.  He is going to be working on diet and exercise a last 6 months.  Checking lab work again in 6 months when I see him back.     Medicare annual wellness visit, subsequent         Pure hypercholesterolemia       Orders:    Hemoglobin A1c; Future    Comprehensive Metabolic Panel; Future    Lipid Panel; Future    Impaired fasting glucose    Orders:    Hemoglobin A1c; Future    Comprehensive Metabolic Panel; Future    Lipid Panel; Future            Follow Up   No follow-ups on file.  Patient was given instructions and counseling regarding his condition or for health maintenance advice. Please see specific information pulled into the AVS if appropriate.

## 2025-04-30 NOTE — ASSESSMENT & PLAN NOTE
Orders:    Hemoglobin A1c; Future    Comprehensive Metabolic Panel; Future    Lipid Panel; Future

## 2025-07-31 ENCOUNTER — PATIENT MESSAGE (OUTPATIENT)
Dept: FAMILY MEDICINE CLINIC | Facility: CLINIC | Age: 70
End: 2025-07-31
Payer: MEDICARE

## 2025-07-31 NOTE — TELEPHONE ENCOUNTER
PT is out of town. Patient is aware of the sign of a blood clot and has been advised go to the ER if these symptoms start. Pt is scheduled for OV with [facility] on Monday at 8:30 AM>

## 2025-08-04 ENCOUNTER — OFFICE VISIT (OUTPATIENT)
Dept: FAMILY MEDICINE CLINIC | Facility: CLINIC | Age: 70
End: 2025-08-04
Payer: MEDICARE

## 2025-08-04 VITALS
TEMPERATURE: 97.8 F | DIASTOLIC BLOOD PRESSURE: 82 MMHG | HEART RATE: 75 BPM | WEIGHT: 234.9 LBS | OXYGEN SATURATION: 95 % | BODY MASS INDEX: 33.63 KG/M2 | HEIGHT: 70 IN | SYSTOLIC BLOOD PRESSURE: 136 MMHG

## 2025-08-04 DIAGNOSIS — M79.662 PAIN IN LEFT LOWER LEG: Primary | ICD-10-CM

## 2025-08-04 PROCEDURE — 99213 OFFICE O/P EST LOW 20 MIN: CPT | Performed by: PHYSICIAN ASSISTANT

## 2025-08-04 PROCEDURE — 1159F MED LIST DOCD IN RCRD: CPT | Performed by: PHYSICIAN ASSISTANT

## 2025-08-04 PROCEDURE — G2211 COMPLEX E/M VISIT ADD ON: HCPCS | Performed by: PHYSICIAN ASSISTANT

## 2025-08-04 PROCEDURE — 1160F RVW MEDS BY RX/DR IN RCRD: CPT | Performed by: PHYSICIAN ASSISTANT

## 2025-08-04 PROCEDURE — 1125F AMNT PAIN NOTED PAIN PRSNT: CPT | Performed by: PHYSICIAN ASSISTANT

## (undated) DEVICE — ADAPT CLN BIOGUARD AIR/H2O DISP

## (undated) DEVICE — ELECTRD BLD EZ CLN MOD XLNG 2.75IN

## (undated) DEVICE — CONN TBG Y 5 IN 1 LF STRL

## (undated) DEVICE — SKYLINE ANTERIOR CERVICAL PLATE SYSTEM DRILL 2.2 X 14MM: Brand: SKYLINE

## (undated) DEVICE — SUT SILK 2/0 TIES 18IN A185H

## (undated) DEVICE — BLD KNIF KARLIN MIC 2MM 4IN

## (undated) DEVICE — SPNG DISECTOR KTNER XRAY COTN 1/4X9/16IN PK/5

## (undated) DEVICE — GLV SURG SENSICARE PI LF PF 7.5 GRN STRL

## (undated) DEVICE — TUBING, SUCTION, 1/4" X 10', STRAIGHT: Brand: MEDLINE

## (undated) DEVICE — Device

## (undated) DEVICE — NEEDLE, QUINCKE, 18GX3.5": Brand: MEDLINE

## (undated) DEVICE — CANNULA,ADULT,SOFT-TOUCH,7'TUBE,UC: Brand: PENDING

## (undated) DEVICE — SENSR O2 OXIMAX FNGR A/ 18IN NONSTR

## (undated) DEVICE — GLV SURG SIGNATURE ESSENTIAL PF LTX SZ7.5

## (undated) DEVICE — KT ORCA ORCAPOD DISP STRL

## (undated) DEVICE — CANN O2 ETCO2 FITS ALL CONN CO2 SMPL A/ 7IN DISP LF

## (undated) DEVICE — PK NEURO SPINE 40

## (undated) DEVICE — SINGLE-USE BIOPSY FORCEPS: Brand: RADIAL JAW 4

## (undated) DEVICE — LIMB HOLDER, WRIST/ANKLE: Brand: DEROYAL

## (undated) DEVICE — ACCY PA700 LUBRICANT DIFFUSER MR7 4 PACK: Brand: MIDAS REX

## (undated) DEVICE — LN SMPL CO2 SHTRM SD STREAM W/M LUER

## (undated) DEVICE — TIGHTNER SHFT SKY LK

## (undated) DEVICE — SMOKE EVACUATION TUBING WITH 7/8 IN TO 1/4 IN REDUCER: Brand: BUFFALO FILTER

## (undated) DEVICE — TUBING,OXYGEN,CRUSH RES,14',CLEAR,SC: Brand: MEDLINE

## (undated) DEVICE — DRP MICROSCOPE 4 BINOCULAR CV 54X150IN

## (undated) DEVICE — PATIENT RETURN ELECTRODE, SINGLE-USE, CONTACT QUALITY MONITORING, ADULT, WITH 9FT CORD, FOR PATIENTS WEIGING OVER 33LBS. (15KG): Brand: MEGADYNE

## (undated) DEVICE — DRP C/ARM 41X74IN

## (undated) DEVICE — PENCL ES MEGADINE EZ/CLEAN BUTN W/HOLSTR 10FT

## (undated) DEVICE — SKIN PREP TRAY W/CHG: Brand: MEDLINE INDUSTRIES, INC.

## (undated) DEVICE — GLV SURG BIOGEL LTX PF 8

## (undated) DEVICE — DRSNG WND BORDR/ADHS NONADHR/GZ LF 4X4IN STRL

## (undated) DEVICE — COTTON BALLS, DOUBLE STRUNG: Brand: DEROYAL

## (undated) DEVICE — 3M™ STERI-STRIP™ ANTIMICROBIAL SKIN CLOSURES 1/2 INCH X 4 INCHES 50/CARTON 4 CARTONS/CASE A1847: Brand: 3M™ STERI-STRIP™

## (undated) DEVICE — TOOL MR8-14HM126 MR8 14CM HM 12.6MM: Brand: MIDAS REX MR8

## (undated) DEVICE — PIN DISTRACT TI 14MM STRL

## (undated) DEVICE — Device: Brand: DEFENDO AIR/WATER/SUCTION AND BIOPSY VALVE

## (undated) DEVICE — GLV SURG BIOGEL LTX PF 7 1/2

## (undated) DEVICE — DISPOSABLE BIPOLAR CABLE 12FT. (3.6M): Brand: KIRWAN

## (undated) DEVICE — THE TORRENT IRRIGATION SCOPE CONNECTOR IS USED WITH THE TORRENT IRRIGATION TUBING TO PROVIDE IRRIGATION FLUIDS SUCH AS STERILE WATER DURING GASTROINTESTINAL ENDOSCOPIC PROCEDURES WHEN USED IN CONJUNCTION WITH AN IRRIGATION PUMP (OR ELECTROSURGICAL UNIT).: Brand: TORRENT

## (undated) DEVICE — TOOL MR8-14MH30 MR8 14CM MATCH 3MM: Brand: MIDAS REX MR8

## (undated) DEVICE — SUT VIC 3/0 X1 27IN J458H

## (undated) DEVICE — ANTIBACTERIAL UNDYED BRAIDED (POLYGLACTIN 910), SYNTHETIC ABSORBABLE SUTURE: Brand: COATED VICRYL